# Patient Record
Sex: MALE | Race: WHITE | Employment: OTHER | URBAN - METROPOLITAN AREA
[De-identification: names, ages, dates, MRNs, and addresses within clinical notes are randomized per-mention and may not be internally consistent; named-entity substitution may affect disease eponyms.]

---

## 2018-01-11 ENCOUNTER — TELEPHONE (OUTPATIENT)
Dept: ORTHOPEDIC SURGERY | Age: 78
End: 2018-01-11

## 2018-01-11 ENCOUNTER — OFFICE VISIT (OUTPATIENT)
Dept: ORTHOPEDIC SURGERY | Age: 78
End: 2018-01-11

## 2018-01-11 VITALS
HEART RATE: 75 BPM | WEIGHT: 184 LBS | BODY MASS INDEX: 26.34 KG/M2 | SYSTOLIC BLOOD PRESSURE: 117 MMHG | HEIGHT: 70 IN | TEMPERATURE: 98.2 F | DIASTOLIC BLOOD PRESSURE: 64 MMHG

## 2018-01-11 DIAGNOSIS — M54.50 ACUTE BILATERAL LOW BACK PAIN WITHOUT SCIATICA: ICD-10-CM

## 2018-01-11 DIAGNOSIS — M25.551 HIP PAIN, BILATERAL: Primary | ICD-10-CM

## 2018-01-11 DIAGNOSIS — M25.552 HIP PAIN, BILATERAL: Primary | ICD-10-CM

## 2018-01-11 PROCEDURE — 4040F PNEUMOC VAC/ADMIN/RCVD: CPT | Performed by: ORTHOPAEDIC SURGERY

## 2018-01-11 PROCEDURE — 1036F TOBACCO NON-USER: CPT | Performed by: ORTHOPAEDIC SURGERY

## 2018-01-11 PROCEDURE — 1123F ACP DISCUSS/DSCN MKR DOCD: CPT | Performed by: ORTHOPAEDIC SURGERY

## 2018-01-11 PROCEDURE — G8427 DOCREV CUR MEDS BY ELIG CLIN: HCPCS | Performed by: ORTHOPAEDIC SURGERY

## 2018-01-11 PROCEDURE — G8484 FLU IMMUNIZE NO ADMIN: HCPCS | Performed by: ORTHOPAEDIC SURGERY

## 2018-01-11 PROCEDURE — 99213 OFFICE O/P EST LOW 20 MIN: CPT | Performed by: ORTHOPAEDIC SURGERY

## 2018-01-11 PROCEDURE — G8419 CALC BMI OUT NRM PARAM NOF/U: HCPCS | Performed by: ORTHOPAEDIC SURGERY

## 2018-01-11 NOTE — PROGRESS NOTES
This dictation was done with StepUp dictation and may contain mechanical errors related to translation. Blood pressure 117/64, pulse 75, temperature 98.2 °F (36.8 °C), temperature source Temporal, height 5' 10\" (1.778 m), weight 184 lb (83.5 kg).

## 2018-01-26 ENCOUNTER — TELEPHONE (OUTPATIENT)
Dept: ORTHOPEDIC SURGERY | Age: 78
End: 2018-01-26

## 2018-02-15 ENCOUNTER — OFFICE VISIT (OUTPATIENT)
Dept: ORTHOPEDIC SURGERY | Age: 78
End: 2018-02-15

## 2018-02-15 VITALS
SYSTOLIC BLOOD PRESSURE: 130 MMHG | HEIGHT: 70 IN | HEART RATE: 66 BPM | DIASTOLIC BLOOD PRESSURE: 73 MMHG | TEMPERATURE: 97.3 F | BODY MASS INDEX: 26.34 KG/M2 | WEIGHT: 184 LBS

## 2018-02-15 DIAGNOSIS — M48.061 SPINAL STENOSIS OF LUMBAR REGION, UNSPECIFIED WHETHER NEUROGENIC CLAUDICATION PRESENT: Primary | ICD-10-CM

## 2018-02-15 PROCEDURE — G8419 CALC BMI OUT NRM PARAM NOF/U: HCPCS | Performed by: PHYSICIAN ASSISTANT

## 2018-02-15 PROCEDURE — G8484 FLU IMMUNIZE NO ADMIN: HCPCS | Performed by: PHYSICIAN ASSISTANT

## 2018-02-15 PROCEDURE — 4040F PNEUMOC VAC/ADMIN/RCVD: CPT | Performed by: PHYSICIAN ASSISTANT

## 2018-02-15 PROCEDURE — 1123F ACP DISCUSS/DSCN MKR DOCD: CPT | Performed by: PHYSICIAN ASSISTANT

## 2018-02-15 PROCEDURE — 99213 OFFICE O/P EST LOW 20 MIN: CPT | Performed by: PHYSICIAN ASSISTANT

## 2018-02-15 PROCEDURE — 1036F TOBACCO NON-USER: CPT | Performed by: PHYSICIAN ASSISTANT

## 2018-02-15 PROCEDURE — G8427 DOCREV CUR MEDS BY ELIG CLIN: HCPCS | Performed by: PHYSICIAN ASSISTANT

## 2018-02-28 ENCOUNTER — OFFICE VISIT (OUTPATIENT)
Dept: ORTHOPEDIC SURGERY | Age: 78
End: 2018-02-28

## 2018-02-28 VITALS
SYSTOLIC BLOOD PRESSURE: 129 MMHG | WEIGHT: 184.08 LBS | DIASTOLIC BLOOD PRESSURE: 75 MMHG | BODY MASS INDEX: 26.35 KG/M2 | HEIGHT: 70 IN

## 2018-02-28 DIAGNOSIS — M51.36 DDD (DEGENERATIVE DISC DISEASE), LUMBAR: ICD-10-CM

## 2018-02-28 DIAGNOSIS — M47.816 SPONDYLOSIS OF LUMBAR REGION WITHOUT MYELOPATHY OR RADICULOPATHY: ICD-10-CM

## 2018-02-28 DIAGNOSIS — M48.062 LUMBAR STENOSIS WITH NEUROGENIC CLAUDICATION: Primary | ICD-10-CM

## 2018-02-28 DIAGNOSIS — M48.02 CERVICAL SPINAL STENOSIS: ICD-10-CM

## 2018-02-28 PROCEDURE — G8427 DOCREV CUR MEDS BY ELIG CLIN: HCPCS | Performed by: PHYSICAL MEDICINE & REHABILITATION

## 2018-02-28 PROCEDURE — G8484 FLU IMMUNIZE NO ADMIN: HCPCS | Performed by: PHYSICAL MEDICINE & REHABILITATION

## 2018-02-28 PROCEDURE — 4040F PNEUMOC VAC/ADMIN/RCVD: CPT | Performed by: PHYSICAL MEDICINE & REHABILITATION

## 2018-02-28 PROCEDURE — 99204 OFFICE O/P NEW MOD 45 MIN: CPT | Performed by: PHYSICAL MEDICINE & REHABILITATION

## 2018-02-28 PROCEDURE — G8419 CALC BMI OUT NRM PARAM NOF/U: HCPCS | Performed by: PHYSICAL MEDICINE & REHABILITATION

## 2018-02-28 NOTE — PROGRESS NOTES
Therapy:  none  · Chiropractic:  none  · Injection:  none  · Medications:   NSAIDS:  yes   Muscle relaxer:  none   Steriods:  none   Neuropathic medications:  none   Opioids:  none  · Previous surgery:  no  · Previous surgical consult:  no  · Other:  · Infection control  · Tested positive for MRSA in past 12 months:  no  · Tested positive for MSSA \"staph infection\" in past 12 months: no  · Tested positive for VRE (Vancomycin Resistant Enterococci) in past 12 months:   no  · Currently on any antibiotics for an infection: no  · Anticoagulants:  · On a blood thinner:  Yes-asa 81 mg   · Any history of bleeding disorder: no   · MRI Contraindication: no   · Previous Pain Management: no                   Past Medical History:   Past Medical History:   Diagnosis Date    Anxiety     Arthritis     Diabetes mellitus ([facility] [address]     High blood pressure     Hyperlipidemia       Past Surgical History:     Past Surgical History:   Procedure Laterality Date    COLONOSCOPY      CYST REMOVAL Left 5/21/15    FOOT SURGERY Left     cyst removed from foot    OTHER SURGICAL HISTORY      tumor removed from neck    SHOULDER ARTHROPLASTY Right 2/17/15    SHOULDER SURGERY Bilateral      Current Medications:     Current Outpatient Prescriptions:     metFORMIN (GLUCOPHAGE) 1000 MG tablet, Take 1,000 mg by mouth 2 times daily (with meals), Disp: , Rfl:     zolpidem (AMBIEN) 10 MG tablet, Take 10 mg by mouth nightly, Disp: , Rfl:     valsartan-hydrochlorothiazide (DIOVAN-HCT) 160-12.5 MG per tablet, Take 1 tablet by mouth daily. , Disp: , Rfl:     glipiZIDE (GLUCOTROL) 5 MG tablet, Take 5 mg by mouth., Disp: , Rfl:     simvastatin (ZOCOR) 40 MG tablet, Take 40 mg by mouth nightly., Disp: , Rfl:     sertraline (ZOLOFT) 25 MG tablet, Take 25 mg by mouth nightly., Disp: , Rfl:     Omega-3 Fatty Acids (FISH OIL) 1000 MG CPDR,  Take 1,000 mg by mouth 2 times daily , Disp: , Rfl:     aspirin 81 MG tablet, Take 81 mg by mouth daily. , Disp: , Rfl:   Allergies:  Codeine and Penicillins  Social History:    reports that he quit smoking about 42 years ago. He has never used smokeless tobacco. He reports that he does not drink alcohol or use drugs. Family History:   Family History   Problem Relation Age of Onset    Cancer Other     Diabetes Other     High Blood Pressure Other          REVIEW OF SYSTEMS: Full ROS noted & scanned          PHYSICAL EXAM:    Vitals: Blood pressure 129/75, height 5' 10\" (1.778 m), weight 184 lb 1.4 oz (83.5 kg). GENERAL EXAM:  · General Apparence: Patient is adequately groomed with no evidence of malnutrition. · Psychiatric: Orientation: The patient is oriented to time, place and person. The patient's mood and affect are appropriate   · Vascular: Examination reveals no swelling and palpation reveals no tenderness in upper or lower extremities. Good capillary refill. · The lymphatic examination of the neck, axillae and groin reveals all areas to be without enlargement or induration   Sensation is intact without deficit in the upper and lower extremities to light touch and pinprick  · Coordination of the upper and lower extremities are normal.    CERVICAL EXAMINATION:  · Inspection: Local inspection shows no step-off or bruising. Cervical alignment is normal. No instability is noted. · Palpation and Percussion: No evidence of tenderness at the midline, and trapezius. Paraspinal tenderness is not present. There is no paraspinal spasm. · Range of Motion:  limited by 25% in all planes due to pain   · Strength: 5/5 bilateral upper extremities  · Special Tests:   Spurling's and Barrera's are negative bilaterally. Brennan and Impingement tests are negative bilaterally. · Skin:There are no rashes, ulcerations or lesions in right & left upper extremities. · Reflexes: Bilaterally triceps, biceps and brachioradialis are 3+. Clonus absent bilaterally at the feet. No pathological reflexes are noted.   · Gait & station: wide distributed. For further information regarding the spine conditions and to review interventional treatments the patient was directed to [facility].  6.  Follow up:  4-6 weeks        Ashley Ugalde MD, JAMES, [facility]  Board Certified in [address] [place] [address] Certified and Fellowship Trained in [facility] ([facility])     This dictation was performed with a verbal recognition program [facility]) and it was checked for errors. It is possible that there are still dictated errors within this office note. If so, please bring any errors to my attention for an addendum. All efforts were made to ensure that this office note is accurate.

## 2018-02-28 NOTE — LETTER
Please schedule the following with:     Date:       Account: [de-identified]  Patient: Vanessa Freitas    : 1940  Address:  [address] [place] [address] [address]    Phone (H):  [phone] (home) [phone] (work)     ----------------------------------------------------------------------------------------------  Diagnosis:     ICD-10-CM ICD-9-CM    1. Lumbar stenosis with neurogenic claudication M48.062 724.03    2. Cervical spinal stenosis M48.02 723.0 MRI Cervical Spine WO Contrast   3. DDD (degenerative disc disease), lumbar M51.36 722.52    4. Spondylosis of lumbar region without myelopathy or radiculopathy M47.816 721.3          Levels: L4  bilaterally  Transforaminal LAUREN    ----------------------------------------------------------------------------------------------  Injection #   [id number] [facility]    Attending Physician       Prabhjot Brown.  Cassidy Mustafa MD.      ----------------------------------------------------------------------------------------------  Injection Scheduled For:    At:    1st Insurance:     Pre-Cert#    2nd Insurance:    Pre-Cert#    Comments:    · Infection control  · Tested positive for MRSA in past 12 months:  no  · Tested positive for MSSA \"staph infection\" in past 12 months: no  · Tested positive for VRE (Vancomycin Resistant Enterococci) in past 12 months:   no  · Currently on any antibiotics for an infection: no  · Anticoagulants:  · On a blood thinner:  ASA   · Any history of bleeding disorder: no   · Advanced Liver disease: no   · Advanced Renal disease: no   · Glaucoma: no   · Diabetes: yes     Sedation:  No  -----------------------------------------------------------------------------------------------  Allergies   Allergen Reactions    Codeine Other (See Comments)     Hallucinations    Penicillins

## 2018-03-01 ENCOUNTER — TELEPHONE (OUTPATIENT)
Dept: ORTHOPEDIC SURGERY | Age: 78
End: 2018-03-01

## 2018-03-01 NOTE — TELEPHONE ENCOUNTER
DOS   3/7/18  CPT   48774.50  66539.26  [id number]  NPR  DX   M48.062  M48.02  M51.36  OP SX AUTH NPR FOR THIS PLAN  BILATERAL  LEVELS   L4   PROCEDURE   EPIDURAL INJECTION  DR. Welch 75:   MEDICARE

## 2018-03-07 ENCOUNTER — HOSPITAL ENCOUNTER (OUTPATIENT)
Dept: PAIN MANAGEMENT | Age: 78
Discharge: HOME | End: 2018-03-07
Attending: PHYSICAL MEDICINE & REHABILITATION | Admitting: PHYSICAL MEDICINE & REHABILITATION

## 2018-03-07 VITALS
HEART RATE: 62 BPM | HEIGHT: 70 IN | TEMPERATURE: 97.2 F | BODY MASS INDEX: 26.63 KG/M2 | RESPIRATION RATE: 18 BRPM | WEIGHT: 186 LBS | OXYGEN SATURATION: 99 % | DIASTOLIC BLOOD PRESSURE: 76 MMHG | SYSTOLIC BLOOD PRESSURE: 140 MMHG

## 2018-03-07 LAB
GLUCOSE BLD-MCNC: 111 MG/DL (ref 70–99)
PERFORMED ON: ABNORMAL

## 2018-03-07 ASSESSMENT — PAIN DESCRIPTION - DESCRIPTORS: DESCRIPTORS: ACHING

## 2018-03-07 ASSESSMENT — PAIN - FUNCTIONAL ASSESSMENT: PAIN_FUNCTIONAL_ASSESSMENT: 0-10

## 2018-03-07 NOTE — PROGRESS NOTES
Adm to post procedure. Awake and alert. Procedural site unremarkable. States pain 2/10.   Stephen lower extremities strength equal.

## 2018-03-07 NOTE — PROGRESS NOTES
TRANSFORAMINAL INJECTION  Dr Garrett Sella Injection Note    Sight marked/ confirmed with x-ray: yes  Position: Prone  Prepped with: Chloraprep    Local 1 % Lidocaine    Depomedrol (mg):  Marcaine: .5%(ml)     Monitor by: Enrique James RN  C - Arm operated by:AMANDA OSBORNE RT  Circulator: Steve He RN  Prepped by: Juan Pablo Connell MD

## 2018-03-20 ENCOUNTER — TELEPHONE (OUTPATIENT)
Dept: ORTHOPEDIC SURGERY | Age: 78
End: 2018-03-20

## 2018-03-20 DIAGNOSIS — E07.9 THYROID LESION: Primary | ICD-10-CM

## 2018-03-20 NOTE — TELEPHONE ENCOUNTER
LM for patient regarding the results of the MRI CSP completed on 03/05/18. Per Dr. Kristan Price:  Schedule for u/s of the thyroid due to right thyroid lesion measuring 2.6 gm    Ordered was placed in the chart. The patient can call to schedule at any time. #[phone]. Patient currently has a f/u appointment with Dr. Kristan Price on 03/21/18.

## 2018-03-21 ENCOUNTER — OFFICE VISIT (OUTPATIENT)
Dept: ORTHOPEDIC SURGERY | Age: 78
End: 2018-03-21

## 2018-03-21 ENCOUNTER — HOSPITAL ENCOUNTER (OUTPATIENT)
Dept: ULTRASOUND IMAGING | Age: 78
Discharge: HOME | End: 2018-03-21
Attending: PHYSICAL MEDICINE & REHABILITATION | Admitting: PHYSICAL MEDICINE & REHABILITATION

## 2018-03-21 VITALS
WEIGHT: 186.07 LBS | HEIGHT: 70 IN | DIASTOLIC BLOOD PRESSURE: 75 MMHG | SYSTOLIC BLOOD PRESSURE: 129 MMHG | BODY MASS INDEX: 26.64 KG/M2

## 2018-03-21 DIAGNOSIS — E07.9 THYROID LESION: ICD-10-CM

## 2018-03-21 DIAGNOSIS — E04.9 THYROID GOITER: ICD-10-CM

## 2018-03-21 DIAGNOSIS — E07.89 OTHER SPECIFIED DISORDERS OF THYROID: ICD-10-CM

## 2018-03-21 DIAGNOSIS — M48.061 SPINAL STENOSIS OF LUMBAR REGION WITHOUT NEUROGENIC CLAUDICATION: Primary | ICD-10-CM

## 2018-03-21 DIAGNOSIS — M47.816 SPONDYLOSIS OF LUMBAR REGION WITHOUT MYELOPATHY OR RADICULOPATHY: ICD-10-CM

## 2018-03-21 PROCEDURE — 1036F TOBACCO NON-USER: CPT | Performed by: PHYSICAL MEDICINE & REHABILITATION

## 2018-03-21 PROCEDURE — 1123F ACP DISCUSS/DSCN MKR DOCD: CPT | Performed by: PHYSICAL MEDICINE & REHABILITATION

## 2018-03-21 PROCEDURE — G8427 DOCREV CUR MEDS BY ELIG CLIN: HCPCS | Performed by: PHYSICAL MEDICINE & REHABILITATION

## 2018-03-21 PROCEDURE — G8484 FLU IMMUNIZE NO ADMIN: HCPCS | Performed by: PHYSICAL MEDICINE & REHABILITATION

## 2018-03-21 PROCEDURE — 99213 OFFICE O/P EST LOW 20 MIN: CPT | Performed by: PHYSICAL MEDICINE & REHABILITATION

## 2018-03-21 PROCEDURE — 4040F PNEUMOC VAC/ADMIN/RCVD: CPT | Performed by: PHYSICAL MEDICINE & REHABILITATION

## 2018-03-21 PROCEDURE — G8419 CALC BMI OUT NRM PARAM NOF/U: HCPCS | Performed by: PHYSICAL MEDICINE & REHABILITATION

## 2018-03-21 NOTE — PROGRESS NOTES
metFORMIN (GLUCOPHAGE) 1000 MG tablet, Take 1,000 mg by mouth 2 times daily (with meals), Disp: , Rfl:     zolpidem (AMBIEN) 10 MG tablet, Take 10 mg by mouth nightly, Disp: , Rfl:     valsartan-hydrochlorothiazide (DIOVAN-HCT) 160-12.5 MG per tablet, Take 1 tablet by mouth daily. , Disp: , Rfl:     glipiZIDE (GLUCOTROL) 5 MG tablet, Take 5 mg by mouth., Disp: , Rfl:     simvastatin (ZOCOR) 40 MG tablet, Take 40 mg by mouth nightly., Disp: , Rfl:     sertraline (ZOLOFT) 25 MG tablet, Take 25 mg by mouth nightly., Disp: , Rfl:     Omega-3 Fatty Acids (FISH OIL) 1000 MG CPDR,  Take 1,000 mg by mouth 2 times daily , Disp: , Rfl:     aspirin 81 MG tablet, Take 81 mg by mouth daily. , Disp: , Rfl:   Allergies:  Codeine and Penicillins  Social History:    reports that he quit smoking about 42 years ago. He has never used smokeless tobacco. He reports that he does not drink alcohol or use drugs. Family History:   Family History   Problem Relation Age of Onset    Cancer Other     Diabetes Other     High Blood Pressure Other        REVIEW OF SYSTEMS:   CONSTITUTIONAL: Denies unexplained weight loss, fevers, chills or fatigue  NEUROLOGICAL: Denies unsteady gait or progressive weakness  MUSCULOSKELETAL: Denies joint swelling or redness  GI: Denies nausea, vomiting, diarrhea   : Denies bowel or bladder issues       PHYSICAL EXAM:    Vitals: Blood pressure 129/75, height 5' 10\" (1.778 m), weight 186 lb 1.1 oz (84.4 kg). GENERAL EXAM:  · General Apparence: Patient is adequately groomed with no evidence of malnutrition. · Psychiatric: Orientation: The patient is oriented to time, place and person. The patient's mood and affect are appropriate   · Vascular: Examination reveals no swelling and palpation reveals no tenderness in upper or lower extremities. Good capillary refill.    · The lymphatic examination of the neck, axillae and groin reveals all areas to be without enlargement or induration  · Sensation is intact without deficit in the upper and lower extremities to light touch and pinprick  · Coordination of the upper and lower extremities are normal.  ·   LUMBAR/SACRAL EXAMINATION:  · Inspection: Local inspection shows no step-off or bruising. Lumbar alignment is normal. No instability is noted. · Palpation:   No evidence of tenderness at the midline. No tenderness bilaterally at the paraspinal or trochanters. There is no paraspinal spasm. · Range of Motion: limited by 50% in all planes due to pain  · Strength:   Strength testing is 5/5 in all muscle groups tested. · Special Tests:   Straight leg raise and crossed SLR negative. · Skin: There are no rashes, ulcerations or lesions. · Reflexes: Reflexes are symmetrically 1+ at the patellar and ankle tendons. Clonus absent bilaterally at the feet. · Gait & station: normal, patient ambulates without assistance  · Additional Examinations:  · RIGHT LOWER EXTREMITY: Inspection/examination of the right lower extremity does not show any tenderness, deformity or injury. Range of motion is normal and pain-free. There is no gross instability. There are no rashes, ulcerations or lesions. Strength and tone are normal. No atrophy or abnormal movements are noted. · LEFT LOWER EXTREMITY:  Inspection/examination of the left lower extremity does not show any tenderness, deformity or injury. Range of motion is normal and pain-free. There is no gross instability. There are no rashes, ulcerations or lesions. Strength and tone are normal. No atrophy or abnormal movements are noted. Diagnostic Testing:    Thyroid u/s shows goiter   Results for orders placed or performed during the hospital encounter of 03/07/18   POCT Glucose   Result Value Ref Range    POC Glucose 111 (H) 70 - 99 mg/dl    Performed on ACCU-CHEK      Impression:       1. Spinal stenosis of lumbar region without neurogenic claudication    2. Spondylosis of lumbar region without myelopathy or radiculopathy    3. Thyroid goiter        Plan:  Clinical Course: Improved pain  1. Medications:  Continue anti-inflammatories with appropriate GI Precautions including to stop if develop dark tarry stools or GI upset and to take with food. 2. PT:  Encouraged to continue with HEP. 3. Further studies: We will send the results of his ultrasound of the thyroid to his PCP  4. Interventional:  90 % relief after LESI  5. Follow up:  4-6 weeks          Moni. Arabella Mckinley MD, JAMES, [facility]  Board Certified in [address] [place] [address] Certified and Fellowship Trained in [facility] ([facility])             This dictation was performed with a verbal recognition program [facility]) and it was checked for errors. It is possible that there are still dictated errors within this office note. If so, please bring any errors to my attention for an addendum. All efforts were made to ensure that this office note is accurate.

## 2018-04-20 ENCOUNTER — OFFICE VISIT (OUTPATIENT)
Dept: ORTHOPEDIC SURGERY | Age: 78
End: 2018-04-20

## 2018-04-20 VITALS
BODY MASS INDEX: 26.63 KG/M2 | HEIGHT: 70 IN | SYSTOLIC BLOOD PRESSURE: 125 MMHG | HEART RATE: 71 BPM | WEIGHT: 186 LBS | DIASTOLIC BLOOD PRESSURE: 73 MMHG

## 2018-04-20 DIAGNOSIS — S82.831A TRAUMATIC CLOSED NONDISPLACED FRACTURE OF DISTAL FIBULA, RIGHT, INITIAL ENCOUNTER: ICD-10-CM

## 2018-04-20 DIAGNOSIS — M25.571 ACUTE RIGHT ANKLE PAIN: Primary | ICD-10-CM

## 2018-04-20 PROCEDURE — 4040F PNEUMOC VAC/ADMIN/RCVD: CPT | Performed by: ORTHOPAEDIC SURGERY

## 2018-04-20 PROCEDURE — G8419 CALC BMI OUT NRM PARAM NOF/U: HCPCS | Performed by: ORTHOPAEDIC SURGERY

## 2018-04-20 PROCEDURE — 99213 OFFICE O/P EST LOW 20 MIN: CPT | Performed by: ORTHOPAEDIC SURGERY

## 2018-04-20 PROCEDURE — 1123F ACP DISCUSS/DSCN MKR DOCD: CPT | Performed by: ORTHOPAEDIC SURGERY

## 2018-04-20 PROCEDURE — G8427 DOCREV CUR MEDS BY ELIG CLIN: HCPCS | Performed by: ORTHOPAEDIC SURGERY

## 2018-04-20 PROCEDURE — 1036F TOBACCO NON-USER: CPT | Performed by: ORTHOPAEDIC SURGERY

## 2018-05-04 ENCOUNTER — OFFICE VISIT (OUTPATIENT)
Dept: ORTHOPEDIC SURGERY | Age: 78
End: 2018-05-04

## 2018-05-04 VITALS
HEART RATE: 82 BPM | WEIGHT: 186 LBS | HEIGHT: 70 IN | SYSTOLIC BLOOD PRESSURE: 104 MMHG | BODY MASS INDEX: 26.63 KG/M2 | DIASTOLIC BLOOD PRESSURE: 63 MMHG

## 2018-05-04 DIAGNOSIS — S82.831D TRAUMATIC CLOSED NONDISPLACED FRACTURE OF DISTAL FIBULA, RIGHT, WITH ROUTINE HEALING, SUBSEQUENT ENCOUNTER: Primary | ICD-10-CM

## 2018-05-04 PROCEDURE — 99213 OFFICE O/P EST LOW 20 MIN: CPT | Performed by: PHYSICIAN ASSISTANT

## 2018-05-25 ENCOUNTER — OFFICE VISIT (OUTPATIENT)
Dept: ORTHOPEDIC SURGERY | Age: 78
End: 2018-05-25

## 2018-05-25 VITALS
BODY MASS INDEX: 26.63 KG/M2 | SYSTOLIC BLOOD PRESSURE: 134 MMHG | DIASTOLIC BLOOD PRESSURE: 76 MMHG | WEIGHT: 186 LBS | HEART RATE: 83 BPM | HEIGHT: 70 IN

## 2018-05-25 DIAGNOSIS — S82.831D TRAUMATIC CLOSED NONDISPLACED FRACTURE OF DISTAL FIBULA, RIGHT, WITH ROUTINE HEALING, SUBSEQUENT ENCOUNTER: Primary | ICD-10-CM

## 2018-05-25 PROCEDURE — 99213 OFFICE O/P EST LOW 20 MIN: CPT | Performed by: PHYSICIAN ASSISTANT

## 2018-06-22 ENCOUNTER — OFFICE VISIT (OUTPATIENT)
Dept: ORTHOPEDIC SURGERY | Age: 78
End: 2018-06-22

## 2018-06-22 VITALS
BODY MASS INDEX: 26.63 KG/M2 | HEART RATE: 68 BPM | SYSTOLIC BLOOD PRESSURE: 139 MMHG | WEIGHT: 186 LBS | HEIGHT: 70 IN | DIASTOLIC BLOOD PRESSURE: 74 MMHG

## 2018-06-22 DIAGNOSIS — S82.831D TRAUMATIC CLOSED NONDISPLACED FRACTURE OF DISTAL FIBULA, RIGHT, WITH ROUTINE HEALING, SUBSEQUENT ENCOUNTER: Primary | ICD-10-CM

## 2018-06-22 PROCEDURE — 99213 OFFICE O/P EST LOW 20 MIN: CPT | Performed by: PHYSICIAN ASSISTANT

## 2018-06-22 PROCEDURE — G8427 DOCREV CUR MEDS BY ELIG CLIN: HCPCS | Performed by: PHYSICIAN ASSISTANT

## 2018-06-22 PROCEDURE — 4040F PNEUMOC VAC/ADMIN/RCVD: CPT | Performed by: PHYSICIAN ASSISTANT

## 2018-06-22 PROCEDURE — G8419 CALC BMI OUT NRM PARAM NOF/U: HCPCS | Performed by: PHYSICIAN ASSISTANT

## 2018-06-22 PROCEDURE — 1123F ACP DISCUSS/DSCN MKR DOCD: CPT | Performed by: PHYSICIAN ASSISTANT

## 2018-06-22 PROCEDURE — 1036F TOBACCO NON-USER: CPT | Performed by: PHYSICIAN ASSISTANT

## 2018-08-03 ENCOUNTER — OFFICE VISIT (OUTPATIENT)
Dept: ORTHOPEDIC SURGERY | Age: 78
End: 2018-08-03

## 2018-08-03 VITALS
HEART RATE: 65 BPM | SYSTOLIC BLOOD PRESSURE: 138 MMHG | WEIGHT: 187 LBS | BODY MASS INDEX: 26.77 KG/M2 | DIASTOLIC BLOOD PRESSURE: 75 MMHG | HEIGHT: 70 IN

## 2018-08-03 DIAGNOSIS — S82.831D TRAUMATIC CLOSED NONDISPLACED FRACTURE OF DISTAL FIBULA, RIGHT, WITH ROUTINE HEALING, SUBSEQUENT ENCOUNTER: Primary | ICD-10-CM

## 2018-08-03 PROCEDURE — 1036F TOBACCO NON-USER: CPT | Performed by: PHYSICIAN ASSISTANT

## 2018-08-03 PROCEDURE — 99213 OFFICE O/P EST LOW 20 MIN: CPT | Performed by: PHYSICIAN ASSISTANT

## 2018-08-03 PROCEDURE — 1123F ACP DISCUSS/DSCN MKR DOCD: CPT | Performed by: PHYSICIAN ASSISTANT

## 2018-08-03 PROCEDURE — G8419 CALC BMI OUT NRM PARAM NOF/U: HCPCS | Performed by: PHYSICIAN ASSISTANT

## 2018-08-03 PROCEDURE — 4040F PNEUMOC VAC/ADMIN/RCVD: CPT | Performed by: PHYSICIAN ASSISTANT

## 2018-08-03 PROCEDURE — 1101F PT FALLS ASSESS-DOCD LE1/YR: CPT | Performed by: PHYSICIAN ASSISTANT

## 2018-08-03 PROCEDURE — G8427 DOCREV CUR MEDS BY ELIG CLIN: HCPCS | Performed by: PHYSICIAN ASSISTANT

## 2018-08-03 NOTE — LETTER
[facility]  [address] [address]  Phone: [phone]  Fax: [phone]    Kevin Lieberman        August 3, 2018     Raffaele Johnson MD  [facility] [address]    Patient: Christopher Ruffin  MR Number: [id number]  YOB: 1940  Date of Visit: 8/3/2018    Dear Dr. Raffaele Johnson:    Thank you for the request for consultation for Gerald Alaniz to me for the evaluation of his right ankle. Below are the relevant portions of my assessment and plan of care. If you have questions, please do not hesitate to call me. I look forward to following Nakia Dietz along with you.     Sincerely,    Dr. Irene Ly PA-C

## 2018-08-03 NOTE — PROGRESS NOTES
at the tip of the fibula which is likely a old injury. Orders:  Orders Placed This Encounter   Procedures    XR ANKLE RIGHT (MIN 3 VIEWS)       Impression:   Diagnosis Orders   1. Traumatic closed nondisplaced fracture of distal fibula, right, with routine healing, subsequent encounter  XR ANKLE RIGHT (MIN 3 VIEWS)       Treatment Plan:    Patient is still having considerable pain and occasional swelling of the lateral malleolus. He is a very active person. His family doctor gave him a order for physical therapy which he would like to do here at [facility]. He is going to go today to schedule those appointments. He will work more on resting the right ankle. He will elevate and ice as needed. He will plan to follow up in 4 weeks at which time we will repeat imaging of the right ankle. Erica Richardson was informed of the results of any imaging. We discussed treatment options and a time was given to answer questions. A plan was proposed and Erica Richardson understand and accepts this course of care. Electronically signed by Lawanda Kowalski PA-C on 5/0/7383  Board Certified [facility]    Please note that portions of this note were completed with a voice recognition program.  Efforts were made to edit the dictations but occasionally words are mis-transcribed.

## 2018-08-06 ENCOUNTER — OFFICE VISIT (OUTPATIENT)
Dept: ORTHOPEDIC SURGERY | Age: 78
End: 2018-08-06

## 2018-08-06 VITALS
BODY MASS INDEX: 27.06 KG/M2 | DIASTOLIC BLOOD PRESSURE: 67 MMHG | WEIGHT: 189 LBS | HEIGHT: 70 IN | HEART RATE: 72 BPM | SYSTOLIC BLOOD PRESSURE: 120 MMHG

## 2018-08-06 DIAGNOSIS — S82.832A OTHER CLOSED FRACTURE OF DISTAL END OF LEFT FIBULA, INITIAL ENCOUNTER: Primary | ICD-10-CM

## 2018-08-06 DIAGNOSIS — S93.422A SPRAIN OF DELTOID LIGAMENT OF LEFT ANKLE, INITIAL ENCOUNTER: ICD-10-CM

## 2018-08-06 PROCEDURE — 99213 OFFICE O/P EST LOW 20 MIN: CPT | Performed by: PHYSICIAN ASSISTANT

## 2018-08-06 PROCEDURE — 1036F TOBACCO NON-USER: CPT | Performed by: PHYSICIAN ASSISTANT

## 2018-08-06 PROCEDURE — 1123F ACP DISCUSS/DSCN MKR DOCD: CPT | Performed by: PHYSICIAN ASSISTANT

## 2018-08-06 PROCEDURE — G8419 CALC BMI OUT NRM PARAM NOF/U: HCPCS | Performed by: PHYSICIAN ASSISTANT

## 2018-08-06 PROCEDURE — G8427 DOCREV CUR MEDS BY ELIG CLIN: HCPCS | Performed by: PHYSICIAN ASSISTANT

## 2018-08-06 PROCEDURE — 1101F PT FALLS ASSESS-DOCD LE1/YR: CPT | Performed by: PHYSICIAN ASSISTANT

## 2018-08-06 PROCEDURE — 4040F PNEUMOC VAC/ADMIN/RCVD: CPT | Performed by: PHYSICIAN ASSISTANT

## 2018-08-06 NOTE — PROGRESS NOTES
Patient Name: Te Harris  Medical Record Number: [id number]  YOB: 1940  Date of Encounter: 8/6/2018     Chief Complaint   Patient presents with    Ankle Injury     Injured Left ankle when he fell down 1 step, onto carpet; doi 8/4/18. History of Present Illness:  Te Harris is a 76 y.o. male here for evaluation of his left ankle. His pain assessment is documented below and I reviewed this with him today. Patient states he tripped down one stair 2 days ago and twisted his left ankle. He is having pain he rates as 6/10 over the lateral ankle. He also has mild medial ankle pain. He has had a lot of lateral ankle swelling. He is 4 months out from a right distal fibular fracture. He still has crutches and a walking boot. He is using his crutches. He is not wearing his walking boot. He denies numbness or tingling in the left leg. He denies pain in the left knee or hip.     Pain Assessment  Location of Pain: Ankle  Severity of Pain: 6  Quality of Pain: Aching  Duration of Pain: Persistent  Frequency of Pain: Intermittent  Date Pain First Started:  (doi 8/4/18)  Aggravating Factors: Walking  Limiting Behavior: Yes  Relieving Factors: Rest  Result of Injury: Yes  Work-Related Injury: No  Are there other pain locations you wish to document?: No    Past Medical History:   Diagnosis Date    Anxiety     Arthritis     Diabetes mellitus ([address]     High blood pressure     Hyperlipidemia        Past Surgical History:   Procedure Laterality Date    COLONOSCOPY      CYST REMOVAL Left 5/21/15    FOOT SURGERY Left     cyst removed from foot    JOINT REPLACEMENT Right     shoulder    OTHER SURGICAL HISTORY      tumor removed from neck    SHOULDER ARTHROPLASTY Right 2/17/15    SHOULDER SURGERY Bilateral     WRIST SURGERY         Current Outpatient Prescriptions   Medication Sig Dispense Refill    metFORMIN (GLUCOPHAGE) 1000 MG tablet Take 1,000 mg by mouth 2 times daily (with meals)     

## 2018-08-20 ENCOUNTER — OFFICE VISIT (OUTPATIENT)
Dept: ORTHOPEDIC SURGERY | Age: 78
End: 2018-08-20

## 2018-08-20 VITALS
WEIGHT: 189 LBS | SYSTOLIC BLOOD PRESSURE: 134 MMHG | HEART RATE: 71 BPM | DIASTOLIC BLOOD PRESSURE: 78 MMHG | HEIGHT: 70 IN | BODY MASS INDEX: 27.06 KG/M2

## 2018-08-20 DIAGNOSIS — S82.832D OTHER CLOSED FRACTURE OF DISTAL END OF LEFT FIBULA WITH ROUTINE HEALING, SUBSEQUENT ENCOUNTER: Primary | ICD-10-CM

## 2018-08-20 PROCEDURE — 1101F PT FALLS ASSESS-DOCD LE1/YR: CPT | Performed by: PHYSICIAN ASSISTANT

## 2018-08-20 PROCEDURE — 1036F TOBACCO NON-USER: CPT | Performed by: PHYSICIAN ASSISTANT

## 2018-08-20 PROCEDURE — 99213 OFFICE O/P EST LOW 20 MIN: CPT | Performed by: PHYSICIAN ASSISTANT

## 2018-08-20 PROCEDURE — G8427 DOCREV CUR MEDS BY ELIG CLIN: HCPCS | Performed by: PHYSICIAN ASSISTANT

## 2018-08-20 PROCEDURE — 1123F ACP DISCUSS/DSCN MKR DOCD: CPT | Performed by: PHYSICIAN ASSISTANT

## 2018-08-20 PROCEDURE — G8419 CALC BMI OUT NRM PARAM NOF/U: HCPCS | Performed by: PHYSICIAN ASSISTANT

## 2018-08-20 PROCEDURE — 4040F PNEUMOC VAC/ADMIN/RCVD: CPT | Performed by: PHYSICIAN ASSISTANT

## 2018-08-20 NOTE — PROGRESS NOTES
routine healing, subsequent encounter         Treatment Plan:    Patient is doing well 2 weeks out from his injury with left distal fibula fracture. He will continue wearing his postop boot. He will continue using crutches as needed. He will rest, elevate and ice. He will plan on following back up in 3 weeks at which time we will repeat imaging. If x-rays show evidence of good bone healing he will likely start physical therapy which he already had planned for his right distal fibula fracture. He will follow up before that time with any concerns. Salma Schmitz was informed of the results of any imaging. We discussed treatment options and a time was given to answer questions. A plan was proposed and Salma Schmitz understand and accepts this course of care. Electronically signed by Dwain Nelson PA-C on 4/81/2822  Board Certified [facility]    Please note that portions of this note were completed with a voice recognition program.  Efforts were made to edit the dictations but occasionally words are mis-transcribed.

## 2018-08-20 NOTE — LETTER
[facility]  [address] [facility] [address]  Phone: [phone]  Fax: [phone]    Maritza Huitron *        August 20, 2018       Patient: Sherrill Figueroa   MR Number: [id number]   YOB: 1940   Date of Visit: 8/20/2018       Dear Dr. Hackett Nim: Thank you for the request for consultation for Dlemy Lois to me for the evaluation of his left ankle. Below are the relevant portions of my assessment and plan of care. If you have questions, please do not hesitate to call me. I look forward to following Lazarus Messier along with you.     Sincerely,    Dr. Val Cavazos PA-C    CC providers:  MD Anish Arevalo [id number]  VIA In Basket

## 2018-09-01 ENCOUNTER — HOSPITAL ENCOUNTER (OUTPATIENT)
Dept: OTHER | Age: 78
Discharge: HOME | End: 2018-09-01
Attending: INTERNAL MEDICINE | Admitting: INTERNAL MEDICINE

## 2018-09-11 ENCOUNTER — OFFICE VISIT (OUTPATIENT)
Dept: ORTHOPEDIC SURGERY | Age: 78
End: 2018-09-11

## 2018-09-11 VITALS — BODY MASS INDEX: 27.06 KG/M2 | HEIGHT: 70 IN | WEIGHT: 189 LBS

## 2018-09-11 DIAGNOSIS — S82.831D OTHER CLOSED FRACTURE OF DISTAL END OF RIGHT FIBULA WITH ROUTINE HEALING, SUBSEQUENT ENCOUNTER: ICD-10-CM

## 2018-09-11 DIAGNOSIS — S82.832D OTHER CLOSED FRACTURE OF DISTAL END OF LEFT FIBULA WITH ROUTINE HEALING, SUBSEQUENT ENCOUNTER: Primary | ICD-10-CM

## 2018-09-11 PROCEDURE — G8419 CALC BMI OUT NRM PARAM NOF/U: HCPCS | Performed by: PHYSICIAN ASSISTANT

## 2018-09-11 PROCEDURE — 1101F PT FALLS ASSESS-DOCD LE1/YR: CPT | Performed by: PHYSICIAN ASSISTANT

## 2018-09-11 PROCEDURE — G8427 DOCREV CUR MEDS BY ELIG CLIN: HCPCS | Performed by: PHYSICIAN ASSISTANT

## 2018-09-11 PROCEDURE — 99213 OFFICE O/P EST LOW 20 MIN: CPT | Performed by: PHYSICIAN ASSISTANT

## 2018-09-11 PROCEDURE — 1123F ACP DISCUSS/DSCN MKR DOCD: CPT | Performed by: PHYSICIAN ASSISTANT

## 2018-09-11 PROCEDURE — 1036F TOBACCO NON-USER: CPT | Performed by: PHYSICIAN ASSISTANT

## 2018-09-11 PROCEDURE — 4040F PNEUMOC VAC/ADMIN/RCVD: CPT | Performed by: PHYSICIAN ASSISTANT

## 2018-09-11 NOTE — PROGRESS NOTES
Patient Name: Anjali Lofton  Medical Record Number: [id number]  YOB: 1940  Date of Encounter: 9/11/2018     Chief Complaint   Patient presents with    Follow-up     Left ankle, distal fibula Fx; doi 8/4/18. History of Present Illness:   Mr. Anjali Lofton is here in 5 week, 3 day follow up regarding his LEFT distal fibula fracture he sustained during a mechanical fall on 8/4/2018. Patient also sustained a nondisplaced RIGHT distal fibular fracture after a mechanical fall on 4/19/2018. Patient feels he is doing well. He stopped wearing his walking boot 3-4 days ago. He denies having bilateral ankle pain at this time. He denies ankle swelling. He states his ankles are very stiff bilaterally. He was just about to start physical therapy on his right ankle when he fractured his left ankle. The patient's past medical history, medications, allergies, family history, social history, and review of systems have been reviewed, and dated and are recorded in the chart under the 'MEDIA\" tab. Physical Exam:    Mr. Anjali Lofton appears well, he is in no apparent distress, he demonstrates appropriate mood & affect. He is alert and oriented to person, place and time. Ht 5' 10\" (1.778 m)   Wt 189 lb (85.7 kg)   BMI 27.12 kg/m²     On examination of patient's left ankle there is no swelling or joint effusion. He still has mild tenderness on palpation of the distal fibula. He has quite range of motion of the left ankle with 4/5 motor strength. He has 2+ distal pulses. He denies sensory deficits. There is no edema or erythema in the affected extremity. There are no signs/symptoms of DVT/PE or infection    Radiology:  X-rays obtained and reviewed in office:   Views: 3 view left ankle including AP, lateral and oblique  Impression: Patient has a healing oblique fracture of the distal fibula. There is no significant displacement or shortening.     Orders:  Orders Placed This Encounter Procedures    XR ANKLE LEFT (MIN 3 VIEWS)       Impression:   Diagnosis Orders   1. Other closed fracture of distal end of left fibula with routine healing, subsequent encounter  XR ANKLE LEFT (MIN 3 VIEWS)   2. Other closed fracture of distal end of right fibula with routine healing, subsequent encounter         Treatment Plan:    Patient is doing well a little over 5 weeks out from his left distal fibula fracture. X-rays show evidence of good bone healing. Patient's pain has improved. He is no longer having swelling. He no longer needs to wear his walking boot. He is using good, tall, lace up shoes. He will start physical therapy working on range of motion and strengthening of his ankles bilaterally. He will follow-up in 6 weeks at which time we will repeat imaging of his ankles bilaterally. Jerryfredacam Gross was informed of the results of any imaging. We discussed treatment options and a time was given to answer questions. A plan was proposed and Kurtis Gross understand and accepts this course of care. Electronically signed by Kira Tse PA-C on 9/98/6220  Board Certified [facility]    Please note that portions of this note were completed with a voice recognition program.  Efforts were made to edit the dictations but occasionally words are mis-transcribed.

## 2018-09-25 ENCOUNTER — HOSPITAL ENCOUNTER (OUTPATIENT)
Dept: PHYSICAL THERAPY | Age: 78
Setting detail: THERAPIES SERIES
Discharge: HOME | End: 2018-09-25
Payer: MEDICARE

## 2018-09-25 PROCEDURE — 97110 THERAPEUTIC EXERCISES: CPT

## 2018-09-25 PROCEDURE — 97112 NEUROMUSCULAR REEDUCATION: CPT

## 2018-09-27 ENCOUNTER — HOSPITAL ENCOUNTER (OUTPATIENT)
Dept: PHYSICAL THERAPY | Age: 78
Setting detail: THERAPIES SERIES
Discharge: HOME | End: 2018-09-27
Payer: MEDICARE

## 2018-09-27 PROCEDURE — 97112 NEUROMUSCULAR REEDUCATION: CPT

## 2018-09-27 PROCEDURE — 97110 THERAPEUTIC EXERCISES: CPT

## 2018-09-27 NOTE — FLOWSHEET NOTE
Physical Therapy Daily Treatment Note  Date:  2018    Patient Name:  Al Ahumada    :  1940  MRN: [id number]  Restrictions/Precautions:   Right ankle fracture 2018, Left ankle fracture 2018. Medical/Treatment Diagnosis Information:   · Diagnosis: Weakness both legs, acute left ankle pain   · Treatment Diagnosis: Impaired balance/proprioception, BLE ankle stability/strength, decreased gait mechanics     Tracking Information:  Physician Information Referring Practitioner: Addis Zheng     Plan of Care Sent Date:  18  Signed Received:    Visit Count / Total Visits  3/12    Insurance Approved Visits  /  Approved Dates:     Insurance Information PT Insurance Information: Medicare     Progress Note/G-codes   [x]  Yes  []  No Next Due:  10th visit      Pain level:  0/10 left ankle      Subjective:   Says the ankle is doing good, hip is a little stiff after the last visit and the exercises done at home. Ankle only hurts when I move it a certain way    Objective:   Observation:  . Pt had LE buckling episode in // bars after step exercises, pt fell almost to knees, assist with bars, and therapist to maintain safely. Pt was OK after rest.   Test measurements:      Exercises:  Exercise/Equipment Resistance/Repetitions Other comments   TM/Bike #1 level 3 x 3 mins     IB, gastroc/soleus, HR/TR Gastroc/Soleus 30\" x 2 each  HR/TR 2x 10     Balance // Bars Wobbleboard Ant/Post, Med/Lat x 20, DLS 28\"     Tandem stance 30\" x 2 B  SLS 20\" x 2 B   Lat Band Walk, Green band x 3 laps     Cable Column     T.G. Partial Squats x10  . Diff performing    Step-Ups 6\" Fwd, Lat x 10 B   4\" step-up and over, ecc control x 10 B (BUE support) .   RLE buckling without UE support for FSU                                              Other Therapeutic Activities:  Pt was educated on PT POC, Diagnosis, Prognosis, pathomechanics, as well as treatment goals and options, and frequency/duration of scheduling future physical therapy appointments. Time was also taken on this day to answer all patient questions involving their participation in PT      Home Exercise Program:  Pt was educated on  HEP including distribution of handout describing exercises, sets, repetitions, frequency and intensity. Exercises/activities include: Ankle ABC's, HR/TR seated and standing, 4-way green TB ankle resistance, SLS, gastroc/soleus stretching       Manual Treatments:  9/19. Manual PROM left ankle, all directions, Grade II/III inf/post mobs, talocrural/subtalar x 5 mins. Modalities:      Timed Code Treatment Minutes:  31    Total Treatment Minutes:  31    Treatment/Activity Tolerance:  [x] Patient tolerated treatment well [] Patient limited by fatigue  [] Patient limited by pain  [] Patient limited by other medical complications  [] Other:     Prognosis: [x] Good [] Fair  [] Poor    Patient Requires Follow-up: [x] Yes  [] No    Plan:   [] Continue per plan of care [] Alter current plan (see comments)  [x] Plan of care initiated [] Hold pending MD visit [] Discharge  Plan for Next Session:  Balance, ankle ROM, manual therapy.   Work on hip strengthening up the chain, gait training    Electronically signed by:  Berto Dey, PT

## 2018-10-02 ENCOUNTER — HOSPITAL ENCOUNTER (OUTPATIENT)
Dept: PHYSICAL THERAPY | Age: 78
Setting detail: THERAPIES SERIES
Discharge: HOME | End: 2018-10-02
Payer: MEDICARE

## 2018-10-02 PROCEDURE — 97110 THERAPEUTIC EXERCISES: CPT

## 2018-10-02 PROCEDURE — 97530 THERAPEUTIC ACTIVITIES: CPT

## 2018-10-04 ENCOUNTER — HOSPITAL ENCOUNTER (OUTPATIENT)
Dept: PHYSICAL THERAPY | Age: 78
Setting detail: THERAPIES SERIES
Discharge: HOME | End: 2018-10-04
Payer: MEDICARE

## 2018-10-04 PROCEDURE — 97112 NEUROMUSCULAR REEDUCATION: CPT

## 2018-10-04 PROCEDURE — 97110 THERAPEUTIC EXERCISES: CPT

## 2018-10-09 ENCOUNTER — HOSPITAL ENCOUNTER (OUTPATIENT)
Dept: PHYSICAL THERAPY | Age: 78
Setting detail: THERAPIES SERIES
Discharge: HOME | End: 2018-10-09
Payer: MEDICARE

## 2018-10-09 PROCEDURE — 97112 NEUROMUSCULAR REEDUCATION: CPT

## 2018-10-09 PROCEDURE — 97110 THERAPEUTIC EXERCISES: CPT

## 2018-10-09 NOTE — FLOWSHEET NOTE
physical therapy appointments. Time was also taken on this day to answer all patient questions involving their participation in PT      Home Exercise Program:  Pt was educated on  HEP including distribution of handout describing exercises, sets, repetitions, frequency and intensity. Exercises/activities include: Ankle ABC's, HR/TR seated and standing, 4-way green TB ankle resistance, SLS, gastroc/soleus stretching       Manual Treatments:  9/19. Manual PROM left ankle, all directions, Grade II/III inf/post mobs, talocrural/subtalar x 5 mins. Modalities:      Timed Code Treatment Minutes:  30      Total Treatment Minutes:   30    Treatment/Activity Tolerance:  [x] Patient tolerated treatment well [] Patient limited by fatigue  [] Patient limited by pain  [] Patient limited by other medical complications  [] Other:     Prognosis: [x] Good [] Fair  [] Poor    Patient Requires Follow-up: [x] Yes  [] No    Plan:   [] Continue per plan of care [] Alter current plan (see comments)  [x] Plan of care initiated [] Hold pending MD visit [] Discharge  Plan for Next Session:  Balance, ankle ROM, manual therapy.   Work on hip strengthening up the chain, gait training    Electronically signed by:  Latisha Saul, PT

## 2018-10-11 ENCOUNTER — HOSPITAL ENCOUNTER (OUTPATIENT)
Dept: PHYSICAL THERAPY | Age: 78
Setting detail: THERAPIES SERIES
Discharge: HOME | End: 2018-10-11
Payer: MEDICARE

## 2018-10-11 PROCEDURE — 97530 THERAPEUTIC ACTIVITIES: CPT

## 2018-10-11 PROCEDURE — 97112 NEUROMUSCULAR REEDUCATION: CPT

## 2018-10-11 NOTE — FLOWSHEET NOTE
Physical Therapy Daily Treatment Note  Date:  10/11/2018    Patient Name:  Guerline Garber    :  1940  MRN: [id number]  Restrictions/Precautions:   Right ankle fracture 2018, Left ankle fracture 2018. Medical/Treatment Diagnosis Information:   · Diagnosis: Weakness both legs, acute left ankle pain   · Treatment Diagnosis: Impaired balance/proprioception, BLE ankle stability/strength, decreased gait mechanics     Tracking Information:  Physician Information Referring Practitioner: Mary Deng     Plan of Care Sent Date:  18  Signed Received:    Visit Count / Total Visits      Insurance Approved Visits  /  Approved Dates:     Insurance Information PT Insurance Information: Medicare     Progress Note/G-codes   []  Yes  [x]  No Next Due:  10th visit      Pain level:  1/10 left ankle      Subjective:   Pt reports his legs have been achy the past couple of days. He thinks it's the weather. Objective:   Observation:  . Pt had LE buckling episode in // bars after step exercises, pt fell almost to knees, assist with bars, and therapist to maintain safely. Pt was OK after rest.   Test measurements:      Exercises:  Exercise/Equipment Resistance/Repetitions Other comments   TM/Bike #1 level 3 x 3 mins     IB, gastroc/soleus, HR/TR Gastroc/Soleus 30\" x 2 each  HR/TR 2x 10     Balance // Bars Wobbleboard Ant/Post, Med/Lat x 20, DLS 12\"     Semi tandem eyes closed    Tandem stance 30\" x 2 B on Airex   Tandem with horizontal and vertical head turns B 2 x 30\"   Narrow NOELLE eyes closed  SLS 20\" x 2 B on Airex    4\" anterior step downx 5 B        Cable Column 3-way, 1.5 plates x 10 B     T.G.  . Diff performing    Step-Ups 6\" Fwd, Lat x 10 B   6\" step-up and over, ecc control x 10 B (BUE support) .   RLE buckling without UE support for FSU   Ambulation  1 lap ~133ft   Shuttle     [employer]  4 square step test with speed ladder x 5  Walking on turf no SPC x 100 ft

## 2018-10-16 ENCOUNTER — HOSPITAL ENCOUNTER (OUTPATIENT)
Dept: PHYSICAL THERAPY | Age: 78
Setting detail: THERAPIES SERIES
Discharge: HOME | End: 2018-10-16
Payer: MEDICARE

## 2018-10-16 PROCEDURE — 97110 THERAPEUTIC EXERCISES: CPT

## 2018-10-16 PROCEDURE — 97112 NEUROMUSCULAR REEDUCATION: CPT

## 2018-10-18 ENCOUNTER — HOSPITAL ENCOUNTER (OUTPATIENT)
Dept: PHYSICAL THERAPY | Age: 78
Setting detail: THERAPIES SERIES
Discharge: HOME | End: 2018-10-18
Payer: MEDICARE

## 2018-10-18 PROCEDURE — G8979 MOBILITY GOAL STATUS: HCPCS

## 2018-10-18 PROCEDURE — 97530 THERAPEUTIC ACTIVITIES: CPT

## 2018-10-18 PROCEDURE — G8978 MOBILITY CURRENT STATUS: HCPCS

## 2018-10-18 ASSESSMENT — PAIN DESCRIPTION - LOCATION: LOCATION: ANKLE

## 2018-10-18 ASSESSMENT — PAIN DESCRIPTION - ORIENTATION: ORIENTATION: RIGHT;LEFT

## 2018-10-18 ASSESSMENT — PAIN DESCRIPTION - PAIN TYPE: TYPE: ACUTE PAIN;CHRONIC PAIN

## 2018-10-18 ASSESSMENT — PAIN SCALES - GENERAL: PAINLEVEL_OUTOF10: 2

## 2018-10-23 ENCOUNTER — OFFICE VISIT (OUTPATIENT)
Dept: ORTHOPEDIC SURGERY | Age: 78
End: 2018-10-23
Payer: MEDICARE

## 2018-10-23 ENCOUNTER — HOSPITAL ENCOUNTER (OUTPATIENT)
Dept: PHYSICAL THERAPY | Age: 78
Setting detail: THERAPIES SERIES
Discharge: HOME | End: 2018-10-23
Payer: MEDICARE

## 2018-10-23 VITALS
HEART RATE: 60 BPM | WEIGHT: 189 LBS | HEIGHT: 70 IN | BODY MASS INDEX: 27.06 KG/M2 | SYSTOLIC BLOOD PRESSURE: 136 MMHG | DIASTOLIC BLOOD PRESSURE: 68 MMHG

## 2018-10-23 DIAGNOSIS — S82.832D OTHER CLOSED FRACTURE OF DISTAL END OF LEFT FIBULA WITH ROUTINE HEALING, SUBSEQUENT ENCOUNTER: Primary | ICD-10-CM

## 2018-10-23 DIAGNOSIS — S82.831D OTHER CLOSED FRACTURE OF DISTAL END OF RIGHT FIBULA WITH ROUTINE HEALING, SUBSEQUENT ENCOUNTER: ICD-10-CM

## 2018-10-23 PROCEDURE — 99213 OFFICE O/P EST LOW 20 MIN: CPT | Performed by: PHYSICIAN ASSISTANT

## 2018-10-23 PROCEDURE — G8484 FLU IMMUNIZE NO ADMIN: HCPCS | Performed by: PHYSICIAN ASSISTANT

## 2018-10-23 PROCEDURE — G8419 CALC BMI OUT NRM PARAM NOF/U: HCPCS | Performed by: PHYSICIAN ASSISTANT

## 2018-10-23 PROCEDURE — 1123F ACP DISCUSS/DSCN MKR DOCD: CPT | Performed by: PHYSICIAN ASSISTANT

## 2018-10-23 PROCEDURE — 97110 THERAPEUTIC EXERCISES: CPT

## 2018-10-23 PROCEDURE — 1101F PT FALLS ASSESS-DOCD LE1/YR: CPT | Performed by: PHYSICIAN ASSISTANT

## 2018-10-23 PROCEDURE — 97112 NEUROMUSCULAR REEDUCATION: CPT

## 2018-10-23 PROCEDURE — G8427 DOCREV CUR MEDS BY ELIG CLIN: HCPCS | Performed by: PHYSICIAN ASSISTANT

## 2018-10-23 PROCEDURE — 1036F TOBACCO NON-USER: CPT | Performed by: PHYSICIAN ASSISTANT

## 2018-10-23 PROCEDURE — 4040F PNEUMOC VAC/ADMIN/RCVD: CPT | Performed by: PHYSICIAN ASSISTANT

## 2018-10-23 NOTE — PROGRESS NOTES
Patient Name: Lila Coreas  Medical Record Number: [id number]  YOB: 1940  Date of Encounter: 10/23/2018     Chief Complaint   Patient presents with    Ankle Pain     Other closed fracture of distal end of left and right fibula with routine healing, subsequent encounter, DOI-8/4/18        History of Present Illness:   Mr. Lila Coreas is here in 6 month follow-up concerning his RIGHT distal fibula fracture that occurred after a mechanical fall on 4/19/2018 and 3 month follow-up concerning a LEFT distal fibula fracture he sustained during a mechanical fall on 8/4/2018. Patient is no longer using a walking boot. He is doing physical therapy. He denies having any pain except occasionally after physical therapy. He is wearing normal lace-up shoes. He states he is working with physical therapy on strengthening. The patient's past medical history, medications, allergies, family history, social history, and review of systems have been reviewed, and dated and are recorded in the chart under the 'MEDIA\" tab. Physical Exam:    Mr. Lila Coreas appears well, he is in no apparent distress, he demonstrates appropriate mood & affect. He is alert and oriented to person, place and time. /68   Pulse 60   Ht 5' 10\" (1.778 m)   Wt 189 lb (85.7 kg)   BMI 27.12 kg/m²     On examination of patient's ankles bilaterally there is no swelling or joint effusion. He denies tenderness on palpation over the distal fibula bilaterally. He has great range of motion with 4+/5 motor strength with movement of his ankles bilaterally. There is no edema or erythema in the affected extremity. There are no signs/symptoms of DVT/PE or infection    Radiology:  X-rays obtained and reviewed in office:     Views: 3 view left ankle including AP, lateral and oblique  Impression: Patient has a healing distal fibula fracture with evidence of callus formation.     Views: 3 view right ankle including AP, lateral and oblique  Impression: Patient has a healed distal fibular fracture. Orders:  Orders Placed This Encounter   Procedures    XR ANKLE LEFT (MIN 3 VIEWS)    XR ANKLE RIGHT (MIN 3 VIEWS)       Impression:   Diagnosis Orders   1. Other closed fracture of distal end of left fibula with routine healing, subsequent encounter  XR ANKLE LEFT (MIN 3 VIEWS)    XR ANKLE RIGHT (MIN 3 VIEWS)   2. Other closed fracture of distal end of right fibula with routine healing, subsequent encounter  XR ANKLE LEFT (MIN 3 VIEWS)    XR ANKLE RIGHT (MIN 3 VIEWS)       Treatment Plan:    Patient is doing well status post bilateral distal fibular fractures. His most recent fracture was his left ankle. He denies having any pain and is still working with physical therapy on strengthening, balance and coordination. He will continue working with physical therapy as long as needed. He did purchase a cane to help prevent falls. I do not believe patient will require a scheduled follow-up visit but will return at any time with any concerns    Sandra Proctor was informed of the results of any imaging. We discussed treatment options and a time was given to answer questions. A plan was proposed and Sandra Proctor understand and accepts this course of care. Electronically signed by Lyssa Brown PA-C on 88/83/9729  Board Certified [facility]    Please note that portions of this note were completed with a voice recognition program.  Efforts were made to edit the dictations but occasionally words are mis-transcribed.

## 2018-10-25 ENCOUNTER — HOSPITAL ENCOUNTER (OUTPATIENT)
Dept: PHYSICAL THERAPY | Age: 78
Setting detail: THERAPIES SERIES
Discharge: HOME | End: 2018-10-25
Payer: MEDICARE

## 2018-10-25 PROCEDURE — 97110 THERAPEUTIC EXERCISES: CPT

## 2018-10-25 PROCEDURE — 97140 MANUAL THERAPY 1/> REGIONS: CPT

## 2018-10-30 ENCOUNTER — HOSPITAL ENCOUNTER (OUTPATIENT)
Dept: PHYSICAL THERAPY | Age: 78
Setting detail: THERAPIES SERIES
Discharge: HOME | End: 2018-10-30
Payer: MEDICARE

## 2018-10-30 PROCEDURE — 97112 NEUROMUSCULAR REEDUCATION: CPT

## 2018-10-30 PROCEDURE — 97110 THERAPEUTIC EXERCISES: CPT

## 2018-11-01 ENCOUNTER — HOSPITAL ENCOUNTER (OUTPATIENT)
Dept: PHYSICAL THERAPY | Age: 78
Setting detail: THERAPIES SERIES
Discharge: HOME | End: 2018-11-01
Payer: MEDICARE

## 2018-11-01 PROCEDURE — 97112 NEUROMUSCULAR REEDUCATION: CPT

## 2018-11-01 PROCEDURE — 97110 THERAPEUTIC EXERCISES: CPT

## 2018-11-06 ENCOUNTER — HOSPITAL ENCOUNTER (OUTPATIENT)
Dept: PHYSICAL THERAPY | Age: 78
Setting detail: THERAPIES SERIES
Discharge: HOME | End: 2018-11-06
Payer: MEDICARE

## 2018-11-06 PROCEDURE — 97140 MANUAL THERAPY 1/> REGIONS: CPT

## 2018-11-06 PROCEDURE — 97110 THERAPEUTIC EXERCISES: CPT

## 2018-11-06 NOTE — FLOWSHEET NOTE
directed the patient's care, made skilled judgement, and was responsible for assessment and treatment of the patient.

## 2018-11-08 ENCOUNTER — HOSPITAL ENCOUNTER (OUTPATIENT)
Dept: PHYSICAL THERAPY | Age: 78
Setting detail: THERAPIES SERIES
Discharge: HOME | End: 2018-11-08
Payer: MEDICARE

## 2018-11-08 PROCEDURE — 97140 MANUAL THERAPY 1/> REGIONS: CPT

## 2018-11-08 PROCEDURE — 97110 THERAPEUTIC EXERCISES: CPT

## 2018-11-13 ENCOUNTER — HOSPITAL ENCOUNTER (OUTPATIENT)
Dept: PHYSICAL THERAPY | Age: 78
Setting detail: THERAPIES SERIES
Discharge: HOME | End: 2018-11-13
Payer: MEDICARE

## 2018-11-13 PROCEDURE — 97110 THERAPEUTIC EXERCISES: CPT

## 2018-11-15 ENCOUNTER — HOSPITAL ENCOUNTER (OUTPATIENT)
Dept: PHYSICAL THERAPY | Age: 78
Setting detail: THERAPIES SERIES
Discharge: HOME | End: 2018-11-15
Payer: MEDICARE

## 2018-11-15 PROCEDURE — 97110 THERAPEUTIC EXERCISES: CPT

## 2018-11-15 NOTE — FLOWSHEET NOTE
Ambulation  1 lap ~133ft   Shuttle     [employer]       Ramps     Stairs HSS 2 x 30\" B  HF 2 x 30\" B    Mat Table Sit to stand, UE assist for ascent, no UE assist for descent x 10                Other Therapeutic Activities:  Pt was educated on PT POC, Diagnosis, Prognosis, pathomechanics, as well as treatment goals and options, and frequency/duration of scheduling future physical therapy appointments. Time was also taken on this day to answer all patient questions involving their participation in PT      Home Exercise Program: 11/15: Sit to stands   11/08: Pt given blue TB for progression of 4-way ankle exercise  11/01: Step up/downs on step at home with UE support. Pt was educated on  HEP including distribution of handout describing exercises, sets, repetitions, frequency and intensity. Exercises/activities include: Ankle ABC's, HR/TR seated and standing, 4-way green TB ankle resistance, SLS, gastroc/soleus stretching       Manual Treatments:   11/08: Manual PROM left ankle, all directions, Grade II/III inf/post mobs, talocrural/subtalar x 8 mins. 11/06:  Manual PROM left ankle, all directions, Grade II/III inf/post mobs, talocrural/subtalar x 10 mins. 10/25: Therastick to the B HS x 12 min  9/19. Manual PROM left ankle, all directions, Grade II/III inf/post mobs, talocrural/subtalar x 5 mins. Modalities:      Timed Code Treatment Minutes:  28      Total Treatment Minutes:   28    Treatment/Activity Tolerance:  [x] Patient tolerated treatment well [] Patient limited by fatigue  [] Patient limited by pain  [] Patient limited by other medical complications  [x] Other: Advised pt to call MD regarding BP-please follow up next visit.      Prognosis: [x] Good [] Fair  [] Poor    Patient Requires Follow-up: [x] Yes  [] No    Plan:   [x] Continue per plan of care [] Alter current plan (see comments)  [] Plan of care initiated [] Hold pending MD visit [] Discharge  Plan for Next Session:  11/01: May try stepping

## 2018-11-20 ENCOUNTER — HOSPITAL ENCOUNTER (OUTPATIENT)
Dept: PHYSICAL THERAPY | Age: 78
Setting detail: THERAPIES SERIES
Discharge: HOME | End: 2018-11-20
Payer: MEDICARE

## 2018-11-20 PROCEDURE — 97110 THERAPEUTIC EXERCISES: CPT

## 2018-11-20 PROCEDURE — 97112 NEUROMUSCULAR REEDUCATION: CPT

## 2018-11-20 NOTE — FLOWSHEET NOTE
eccentric control with sit to stand this date    Prognosis: [x] Good [] Fair  [] Poor    Patient Requires Follow-up: [x] Yes  [] No    Plan:   [x] Continue per plan of care [] Alter current plan (see comments)  [] Plan of care initiated [] Hold pending MD visit [] Discharge  Plan for Next Session:  11/01: May try stepping over cone in // bars, consider DF mobs. Balance, ankle ROM, manual therapy. Work on hip strengthening up the chain, gait training; New POC up to 2x/week for 4 weeks if needed.     Electronically signed by: Fide Solis, PT, DPT

## 2018-11-27 ENCOUNTER — HOSPITAL ENCOUNTER (OUTPATIENT)
Dept: PHYSICAL THERAPY | Age: 78
Setting detail: THERAPIES SERIES
Discharge: HOME | End: 2018-11-27
Payer: MEDICARE

## 2018-11-27 PROCEDURE — 97110 THERAPEUTIC EXERCISES: CPT

## 2018-11-27 PROCEDURE — 97112 NEUROMUSCULAR REEDUCATION: CPT

## 2018-11-29 ENCOUNTER — HOSPITAL ENCOUNTER (OUTPATIENT)
Dept: PHYSICAL THERAPY | Age: 78
Setting detail: THERAPIES SERIES
Discharge: HOME | End: 2018-11-29
Payer: MEDICARE

## 2018-11-29 PROCEDURE — 97530 THERAPEUTIC ACTIVITIES: CPT

## 2018-11-29 PROCEDURE — G8980 MOBILITY D/C STATUS: HCPCS

## 2018-11-29 PROCEDURE — G8978 MOBILITY CURRENT STATUS: HCPCS

## 2018-11-29 PROCEDURE — G8979 MOBILITY GOAL STATUS: HCPCS

## 2018-11-29 NOTE — PROGRESS NOTES
Outpatient Physical Therapy    Phone: [phone] Fax: [phone]    Physical Therapy Discharge Note  Date: 2018        Patient Name:  Neris Bahena    :  1940  MRN: [id number]  Restrictions/Precautions:    Medical/Treatment Diagnosis Information:  · Diagnosis: Weakness both legs, acute left ankle pain   · Treatment Diagnosis: Impaired balance/proprioception, BLE ankle stability/strength, decreased gait mechanics   Insurance/Certification information:  PT Insurance Information: Medicare   Physician Information:  Referring Practitioner: Ti Wayne PA-C, [facility] of care signed (Y/N): Y   Visit# / total visits:    Pain level: 0/10     G-Code (if applicable):      Date G-Code Applied:  2018  PT G-Codes  Functional Assessment Tool Used: PT Assessment  Functional Limitation: Mobility: Walking and moving around  Mobility: Walking and Moving Around Current Status (): At least 1 percent but less than 20 percent impaired, limited or restricted  Mobility: Walking and Moving Around Goal Status (): 0 percent impaired, limited or restricted  Mobility: Walking and Moving Around Discharge Status (): At least 1 percent but less than 20 percent impaired, limited or restricted     Time Period for Report: 18 - 18   Cancels/No-shows to date:  0    Plan of Care/Treatment to date:  [x] Therapeutic Exercise      [] Modalities:  [x] Therapeutic Activity       [] Ultrasound    [x] Gait Training        [] Cervical Traction   [x] Neuromuscular Re-education      [] Cold/hotpack    [x] Instruction in HEP        [] Lumbar Traction  [x] Manual Therapy        [] Electrical Stimulation            [] Aquatic Therapy        [] Iontophoresis        ? [] Lymphedema management  [] Women's Health     Other:  [] Vestibular Rehab        []                     ?       Significant Findings At Last Visit/Comments:    Subjective:  Subjective  Subjective: Pt reports that his ankles are not bothering him anymore and the issue is his migraine. Pt states he has made 99% improvement since starting PT with his ankles. Pt reports his blood pressure was great this morning and was able to walk 1 mile with no issues. Pt reports he doesn't have any limitations with activities when it comes to his ankles. Pt feels like he can be discharged at this date. Pain Screening  Patient Currently in Pain: No         Objective:  Observation/Palpation  Posture: Good  AROM LLE (degrees)  L Ankle Dorsiflexion 0-20: 10  L Ankle Plantar Flexion 0-45: 45  L Ankle Forefoot Inversion 0-40: 27  L Ankle Forefoot Eversion 0-20: 10  AROM RLE (degrees)  R Ankle Dorsiflexion 0-20: 15 deg  R Ankle Plantar Flexion 0-45: 45 deg  R Ankle Forefoot Inversion 0-40: 40  R Ankle Forefoot Eversion 0-20: 11  Strength RLE  Comment: Ankle DF:  5/5, Eversion:  5/5 Inv: 5/5  Hip Flexion:  4-/5 Knee Flex: 5/5 Knee Ext: 5/5 Hip Abd 5/5   Strength LLE  Comment: Ankle DF:  5/5, Eversion:  5/5 Inv: 5/5  Hip Flexion:  4-/5 Knee Flex: 5/5 Knee Ext: 5/5 Hip Abd 5/5   Balance  Tandem Stance R Le  Tandem Stance L Leg: 10  Single Leg Stance R Le  Single Leg Stance L Le     Assessment:  Conditions Requiring Skilled Therapeutic Intervention  Body structures, Functions, Activity limitations: Decreased functional mobility , Decreased ROM, Decreased strength, Decreased balance  Assessment: Pt has received 20 skilled PT visits to work on B ankle ROM, BLE strength, and balance due to bilateral ankle fractures. Pt has shown improvement in B ankle ROM, decreased pain, increased balance on RLE, increased B LE strength. Pt shows some deficits with balance on LLE but did demonstrate slight improvement since last tested. Pt was discharged at this date with HEP and 30 day pass to [facility].   Treatment Diagnosis: Impaired balance/proprioception, BLE ankle stability/strength, decreased gait mechanics   Prognosis: Good  Decision Making: the patient's care, made skilled judgement, and was responsible for assessment and treatment of the patient. If you have any questions or concerns, please don't hesitate to call.   Thank you for your referral.    Physician Signature:________________________________Date:__________________  By signing above, therapists plan is approved by physician

## 2019-05-20 ENCOUNTER — OFFICE VISIT (OUTPATIENT)
Dept: ORTHOPEDIC SURGERY | Age: 79
End: 2019-05-20
Payer: MEDICARE

## 2019-05-20 VITALS
SYSTOLIC BLOOD PRESSURE: 120 MMHG | BODY MASS INDEX: 27.06 KG/M2 | DIASTOLIC BLOOD PRESSURE: 72 MMHG | HEIGHT: 70 IN | WEIGHT: 189 LBS | HEART RATE: 71 BPM | TEMPERATURE: 98.4 F

## 2019-05-20 DIAGNOSIS — M47.816 SPONDYLOSIS OF LUMBAR REGION WITHOUT MYELOPATHY OR RADICULOPATHY: Primary | ICD-10-CM

## 2019-05-20 DIAGNOSIS — M65.342 TRIGGER FINGER, LEFT RING FINGER: ICD-10-CM

## 2019-05-20 PROCEDURE — 4040F PNEUMOC VAC/ADMIN/RCVD: CPT | Performed by: PHYSICIAN ASSISTANT

## 2019-05-20 PROCEDURE — G8419 CALC BMI OUT NRM PARAM NOF/U: HCPCS | Performed by: PHYSICIAN ASSISTANT

## 2019-05-20 PROCEDURE — 99214 OFFICE O/P EST MOD 30 MIN: CPT | Performed by: PHYSICIAN ASSISTANT

## 2019-05-20 PROCEDURE — 1123F ACP DISCUSS/DSCN MKR DOCD: CPT | Performed by: PHYSICIAN ASSISTANT

## 2019-05-20 PROCEDURE — 1036F TOBACCO NON-USER: CPT | Performed by: PHYSICIAN ASSISTANT

## 2019-05-20 PROCEDURE — 20550 NJX 1 TENDON SHEATH/LIGAMENT: CPT | Performed by: PHYSICIAN ASSISTANT

## 2019-05-20 PROCEDURE — G8427 DOCREV CUR MEDS BY ELIG CLIN: HCPCS | Performed by: PHYSICIAN ASSISTANT

## 2019-05-21 PROBLEM — M65.342 TRIGGER FINGER, LEFT RING FINGER: Status: ACTIVE | Noted: 2019-05-21

## 2019-05-21 PROBLEM — M47.816 SPONDYLOSIS OF LUMBAR REGION WITHOUT MYELOPATHY OR RADICULOPATHY: Status: ACTIVE | Noted: 2019-05-21

## 2019-05-21 NOTE — PROGRESS NOTES
Subjective:      Patient ID: Francisca Michel is a [de-identified] y.o.  male. Chief Complaint   Patient presents with    Back Pain    Trigger finger     left ring trigger        HPI:  He is here for follow-up regarding her low back pain and buttock pain. Denies any history of injury. Denies any significant numbness or tingling lower extremities are perceived weakness. All activities including sitting, standing and walking aggravates his pain. Pain currently in the low back buttock region is 5/10. He was evaluated and treated for lumbar stenosis back in February 2018 with epidural steroid injections with significant relief. He states his pain is exactly the same as he had at that time. He also wants to be evaluated for left hand/  ring finger pain as well as locking. Onset of symptoms weeks. These symptoms have not been progressive in nature. There is no history of injury. Pain is moderate. Location of pain- left Ring finger. Pain is worse with movement. Pain improves with ice and elevation. There is not associated numbness/ tingling. Previous treatments for the left hand have included: Massage and ice with minimal relief      Review of Systems:   A 14 point review of systems and history form completed by the patient has been reviewed. This form is scanned in the media tab of the patient's chart under today's date.     Past Medical History:   Diagnosis Date    Anxiety     Arthritis     Diabetes mellitus ([address]     High blood pressure     Hyperlipidemia        Family History   Problem Relation Age of Onset    Cancer Other     Diabetes Other     High Blood Pressure Other        Past Surgical History:   Procedure Laterality Date    COLONOSCOPY      CYST REMOVAL Left 5/21/15    FOOT SURGERY Left     cyst removed from foot    JOINT REPLACEMENT Right     shoulder    OTHER SURGICAL HISTORY      tumor removed from neck    SHOULDER ARTHROPLASTY Right 2/17/15    SHOULDER SURGERY Bilateral     WRIST SURGERY         Social History     Occupational History    Occupation: Retired   Tobacco Use    Smoking status: Former Smoker     Last attempt to quit: 1975     Years since quittin.0    Smokeless tobacco: Never Used   Substance and Sexual Activity    Alcohol use: No    Drug use: No    Sexual activity: Not on file       Current Outpatient Medications   Medication Sig Dispense Refill    metFORMIN (GLUCOPHAGE) 1000 MG tablet Take 1,000 mg by mouth 2 times daily (with meals)      zolpidem (AMBIEN) 10 MG tablet Take 10 mg by mouth nightly      valsartan-hydrochlorothiazide (DIOVAN-HCT) 160-12.5 MG per tablet Take 1 tablet by mouth daily.  glipiZIDE (GLUCOTROL) 5 MG tablet Take 5 mg by mouth.  simvastatin (ZOCOR) 40 MG tablet Take 40 mg by mouth nightly.  sertraline (ZOLOFT) 25 MG tablet Take 25 mg by mouth nightly.  Omega-3 Fatty Acids (FISH OIL) 1000 MG CPDR   Take 1,000 mg by mouth 2 times daily       aspirin 81 MG tablet Take 81 mg by mouth daily. No current facility-administered medications for this visit. .    Objective:   He  is  oriented to person, place and time, pleasant, well nourished, developed and in no acute distress. /72   Pulse 71   Temp 98.4 °F (36.9 °C) (Temporal)   Ht 5' 10\" (1.778 m)   Wt 189 lb (85.7 kg)   BMI 27.12 kg/m²        LUMBAR SPINE EXAM:  Examination of the Lumbar spine shows:  Deformity Absent . Soft Tissue Swelling Absent . Soft Tissue Tenderness Absent . Midline Bone Tenderness Absent . Paraspinal Muscular Spasm Absent . Previous Incisions Absent . Erythema Absent . Lumbar Flexion does produce pain. Lumbar Extension does produce pain. NEUROLOGICAL EXAM:  SLR     Left: Negative. Right Negative. DTR 1+ bilaterally patella and Achilles. Motor Strength Exam:  5/5 in all major motor groups of the lower extremities. Barrera's Sign Absent   Gait normal Heel/ Toe.   Sensation to Touch normal    VASCULAR EXAM:  Examination of the upper and lower extremities shows intact perfusion to all extremities, no cyanosis, digits are warm to touch, capillary refill is less than 2 seconds. No significant edema noted. SKIN:  Examination of the skin reveals the skin to be intact without lacerations, abrasions, significant erythema, rashes or skin lesions. Left Hand Exam:  Skin: Skin color, texture, turgor normal. No rashes or lesions bilaterally   Digital range of motion is full and equal bilateral .   There is not an associated flexion contracture of the IP joint. No other digit demonstrates evidence for stenosing tenosynovitis bilaterally. Wrist range of motion is full and equal bilateral.   Sensation is normal in the Whole Hand bilaterally   Vascular examination reveals normal and good capillary refill bilaterally   Swelling is minimal in the symptomatic digit, absent elsewhere bilaterally   Examination for Stenosing Tenosynovitis demonstrates moderate tenderness, thickening & nodularity at the A-1 pulley(s) of the left 4th finger. There is a palpable Nota's Node. No other digits demonstrate evidence of Stenosing Tenosynovitis. Examination of the first Carpo-Metacarpal Joints of the wrist demonstrates no radial subluxation of the Thumb Metacarpal base upon the Trapezium. There is no significant pain with palpation or crepitance at the [facility] joint line. X Rays: not performed in the office today:       Assessment:       ICD-10-CM    1. Spondylosis of lumbar region without myelopathy or radiculopathy M47.816 Hiram Stubbs MD, Spine Surgery, [facility]   2. Trigger finger, left ring finger M65.342 [id number] - DE INJECT TRIGGER POINT, 1 OR 2     DE TRIAMCINOLONE ACETONIDE INJ        Plan:     Is having chronic pain due to lumbar stenosis. He will be referred back to Dr. Eduardo Hunter for consideration of lumbar epidural steroid injections. He is having triggering of the left ring finger.     The natural history of the patient's diagnosis as well as the treatment options were discussed in full and questions were answered. Risks and benefits of the treatment options also reviewed in detail. Discussed injection of cortisone as well as the use of NSAID's. Discussed surgical treatment, Trigger Finger Release, if symptoms fail to improve with conservative treatment or if symptoms continue to return after conservative treatment. Cortisone Injection   Trigger Finger    PROCEDURE NOTE:   Pre op Diagnosis: Left Ring  Trigger Finger   Post op Diagnosis: Same   With his permission, the  Left Ring finger was prepped in standard sterile fashion with Alcohol and 2 cc of 0.25% Marcaine and 1 cc of Kenalog 40 mg was injected into the Ring finger A1 pulley/ tendon sheath region without difficulty. He tolerated this well without difficulty. A band-aid was applied. The patient was advised to ice the area for 15-20 minutes to relieve any injection site related pain. Follow Up:   Call or return to clinic prn if these symptoms worsen or fail to improve as anticipated.

## 2019-05-23 ENCOUNTER — OFFICE VISIT (OUTPATIENT)
Dept: ORTHOPEDIC SURGERY | Age: 79
End: 2019-05-23
Payer: MEDICARE

## 2019-05-23 DIAGNOSIS — M54.16 LUMBAR RADICULOPATHY: ICD-10-CM

## 2019-05-23 DIAGNOSIS — M51.26 HNP (HERNIATED NUCLEUS PULPOSUS), LUMBAR: Primary | ICD-10-CM

## 2019-05-23 DIAGNOSIS — M47.816 SPONDYLOSIS OF LUMBAR REGION WITHOUT MYELOPATHY OR RADICULOPATHY: ICD-10-CM

## 2019-05-23 PROCEDURE — 99214 OFFICE O/P EST MOD 30 MIN: CPT | Performed by: PHYSICAL MEDICINE & REHABILITATION

## 2019-05-23 PROCEDURE — 4040F PNEUMOC VAC/ADMIN/RCVD: CPT | Performed by: PHYSICAL MEDICINE & REHABILITATION

## 2019-05-23 PROCEDURE — 1123F ACP DISCUSS/DSCN MKR DOCD: CPT | Performed by: PHYSICAL MEDICINE & REHABILITATION

## 2019-05-23 PROCEDURE — 1036F TOBACCO NON-USER: CPT | Performed by: PHYSICAL MEDICINE & REHABILITATION

## 2019-05-23 PROCEDURE — G8427 DOCREV CUR MEDS BY ELIG CLIN: HCPCS | Performed by: PHYSICAL MEDICINE & REHABILITATION

## 2019-05-23 PROCEDURE — G8419 CALC BMI OUT NRM PARAM NOF/U: HCPCS | Performed by: PHYSICAL MEDICINE & REHABILITATION

## 2019-05-23 NOTE — LETTER
Please schedule the following with:     Date:   2019 @ 10:30    2019 @ 11:00    Account: [de-identified]  Patient: Sim Weaver    : 1940  Address:  [address]    Phone (H):  [phone] (home) [phone] (work)     ----------------------------------------------------------------------------------------------  Diagnosis:     ICD-10-CM    1. HNP (herniated nucleus pulposus), lumbar M51.26    2. Lumbar radiculopathy M54.16    3. Spondylosis of lumbar region without myelopathy or radiculopathy M47.816          Levels:L4  Procedure type TRANSFORAMINAL   Side BILATERAL  CPT Codes 78137    ----------------------------------------------------------------------------------------------  Injection #   [id number] [facility]    Attending Physician       Miquel Clemente.  Kerwin Jara MD.      ----------------------------------------------------------------------------------------------  Injection Scheduled For:    At:    1st Insurance MEDICARE [employer]    Pre-Cert#    2nd Insurance A[employer]    Pre-Cert#    Comments or Special instructions:    · Infection control  · Tested positive for MRSA in past 12 months:  no  · Tested positive for MSSA \"staph infection\" in past 12 months: no  · Tested positive for VRE (Vancomycin Resistant Enterococci) in past 12 months:   no  · Currently on any antibiotics for an infection: no  · Anticoagulants:  · On a blood thinner:  yes ASPIRIN 81  · Any history of bleeding disorder: no   · Advanced Liver disease: no   · Advanced Renal disease: no   · Glaucoma: no   · Diabetes: yes    Sedation:  No  -----------------------------------------------------------------------------------------------  Allergies   Allergen Reactions    Codeine Other (See Comments)     Hallucinations    Penicillins

## 2019-05-23 NOTE — PROGRESS NOTES
Follow up: [address]  1940  [id number]         Chief Complaint   Patient presents with    Back Pain     F/U LSP, no new injury, pain progressively worse over the last 6 months, was last seen 3/2018, had LAUREN TF 3/7/18    Hip Pain     bilateral         HISTORY OF PRESENT ILLNESS:  Mr. Nayla Quinn is a [de-identified] y.o. male returns for a follow up visit for multiple medical problems. His current presenting problems are   1. HNP (herniated nucleus pulposus), lumbar    2. Lumbar radiculopathy    3. Spondylosis of lumbar region without myelopathy or radiculopathy    . As per information/history obtained from the PADT(patient assessment and documentation tool) - He complains of pain in the lower back with radiation to the hips Bilateral He rates the pain 6/10 and describes it as sharp, aching. Pain is made worse by: standing, sitting. He denies side effects from the current pain regimen. Patient reports that since the last follow up visit the physical functioning is worse, family/social relationships are worse, mood is worse and sleep patterns are worse, and that the overall functioning is worse. Patient denies neurological bowel or bladder. Patient reports today following up on his lumbar spine. He was last seen in March of 2018. He reports the pain has getting progressively worse over the last 6 months. He underwent an epidural injection on 3/7/18 which helped. He denies any new injuries to her back. He did however fracture both of his ankles in October about 6 weeks apart. He reports he has recovered well from the fractures of his ankles. He does state though his back pain has been worsening and now radiates to both hips.       Associated signs and symptoms:   Neurogenic bowel or bladder symptoms:  no   Perceived weakness:  yes   Difficulty walking:  yes              Past Medical History:   Past Medical History:   Diagnosis Date    Anxiety     Arthritis     Diabetes mellitus ([facility] [address]     High blood L5/S1 tenderness  Bursal tenderness No tenderness bilaterally  There is no paraspinal spasm. · Range of Motion: limited by 25% in all planes due to pain  · Strength:   Strength testing is 5/5 in all muscle groups tested. · Special Tests:   Straight leg raise and crossed SLR negative. · Skin: There are no rashes, ulcerations or lesions. · Reflexes: Reflexes are symmetrically 1+ at the patellar and ankle tendons. Clonus absent bilaterally at the feet. · Gait & station: normal, patient ambulates without assistance  · Additional Examinations:  · RIGHT LOWER EXTREMITY: Inspection/examination of the right lower extremity does not show any tenderness, deformity or injury. Range of motion is normal and pain-free. There is no gross instability. There are no rashes, ulcerations or lesions. Strength and tone are normal. No atrophy or abnormal movements are noted. · LEFT LOWER EXTREMITY:  Inspection/examination of the left lower extremity does not show any tenderness, deformity or injury. Range of motion is normal and pain-free. There is no gross instability. There are no rashes, ulcerations or lesions. Strength and tone are normal. No atrophy or abnormal movements are noted. Diagnostic Testing:    MR Lumbar spine shows L4 5 disc protrusion with foraminal stenosis noted at L5-S1 spondylolisthesis  Results for orders placed or performed during the hospital encounter of 03/07/18   POCT Glucose   Result Value Ref Range    POC Glucose 111 (H) 70 - 99 mg/dl    Performed on ACCU-CHEK      Impression:       1. HNP (herniated nucleus pulposus), lumbar    2. Lumbar radiculopathy    3. Spondylosis of lumbar region without myelopathy or radiculopathy        Plan:  Clinical Course: Above diagnoses are worsening    I discussed the diagnosis and the treatment options with Dario Armstrong today.      In Summary:  The various treatment options were outlined and discussed with Dario Armstrong including:  Conservative care options: physical therapy, ice, medications, bracing, and activity modification. The indications for therapeutic injections. The indications for additional imaging/laboratory studies. The indications for (possible future) interventions. After considering the various options discussed, Ashley Marin elected to pursue a course of treatment that includes the followin. Medications:  Continue anti-inflammatories with appropriate GI Precautions including to stop if develop dark tarry stools or GI upset and to take with food. 2. PT:  Encouraged to continue with HEP. 3. Further studies:  No further studies. 4. Interventional:  We discussed pursuing a bilateral L4 TF epidural steroid injection to address the pain. Radiologic imaging and symptoms confirm the pain etiology. Risks, benefits and alternatives of interventional options were discussed. These include and are not limited to bleeding, infection, spinal headache, nerve injury and lack of pain relief. The patient verbalized understanding and would like to proceed. The patient will be scheduled accordingly. 5. Follow up:  4-6 weeks      Ashley Marin was instructed to call the office if his symptoms worsen or if new symptoms appear prior to the next scheduled visit. He is specifically instructed to contact the office between now & his scheduled appointment if he has concerns related to his condition or if he needs assistance in scheduling the above tests. He is welcome to call for an appointment sooner if he has any additional concerns or questions. IOtilio, am scribing for and in the presence of Dr. Wood Elias. 19 3:24 PM  Otilio Weston ATC, Elisabeth Baker, Dr. Iris Venegas. Cherelle, personally performed the services described in this documentation as scribed by SILVIANO Haynes in my presence and it is both accurate and complete. Whit Montanez.  Juan C Nieto MD, JAMES, [facility]  Board Certified in [address]

## 2019-06-05 ENCOUNTER — TELEPHONE (OUTPATIENT)
Dept: ORTHOPEDIC SURGERY | Age: 79
End: 2019-06-05

## 2019-06-05 NOTE — TELEPHONE ENCOUNTER
Auth: NPR  Date: 6/12/19  CPT: 04568, 48 Modifier, 35720 26, 21509  DX: M51.26, M54.16, M47.816   Outpaitent  Procedure: Rafael  SX Location: [facility]   Insurance: Medicare  Physician: JANESSA

## 2019-06-12 ENCOUNTER — APPOINTMENT (OUTPATIENT)
Dept: GENERAL RADIOLOGY | Age: 79
End: 2019-06-12
Attending: PHYSICAL MEDICINE & REHABILITATION
Payer: MEDICARE

## 2019-06-12 ENCOUNTER — HOSPITAL ENCOUNTER (OUTPATIENT)
Age: 79
Setting detail: OUTPATIENT SURGERY
Discharge: HOME | End: 2019-06-12
Attending: PHYSICAL MEDICINE & REHABILITATION | Admitting: PHYSICAL MEDICINE & REHABILITATION
Payer: MEDICARE

## 2019-06-12 VITALS
OXYGEN SATURATION: 100 % | HEART RATE: 59 BPM | RESPIRATION RATE: 16 BRPM | DIASTOLIC BLOOD PRESSURE: 66 MMHG | TEMPERATURE: 97 F | SYSTOLIC BLOOD PRESSURE: 134 MMHG

## 2019-06-12 LAB
GLUCOSE BLD-MCNC: 136 MG/DL (ref 70–99)
PERFORMED ON: ABNORMAL

## 2019-06-12 PROCEDURE — 3209999900 FLUORO FOR SURGICAL PROCEDURES

## 2019-06-12 PROCEDURE — 3610000056 HC PAIN LEVEL 4 BASE (NON-OR): Performed by: PHYSICAL MEDICINE & REHABILITATION

## 2019-06-12 PROCEDURE — 6360000002 HC RX W HCPCS: Performed by: PHYSICAL MEDICINE & REHABILITATION

## 2019-06-12 PROCEDURE — 2500000003 HC RX 250 WO HCPCS: Performed by: PHYSICAL MEDICINE & REHABILITATION

## 2019-06-12 PROCEDURE — 2709999900 HC NON-CHARGEABLE SUPPLY: Performed by: PHYSICAL MEDICINE & REHABILITATION

## 2019-06-12 PROCEDURE — 99152 MOD SED SAME PHYS/QHP 5/>YRS: CPT | Performed by: PHYSICAL MEDICINE & REHABILITATION

## 2019-06-12 RX ORDER — MIDAZOLAM HYDROCHLORIDE 1 MG/ML
INJECTION INTRAMUSCULAR; INTRAVENOUS
Status: COMPLETED | OUTPATIENT
Start: 2019-06-12 | End: 2019-06-12

## 2019-06-12 RX ORDER — METHYLPREDNISOLONE ACETATE 80 MG/ML
INJECTION, SUSPENSION INTRA-ARTICULAR; INTRALESIONAL; INTRAMUSCULAR; SOFT TISSUE
Status: COMPLETED | OUTPATIENT
Start: 2019-06-12 | End: 2019-06-12

## 2019-06-12 RX ORDER — BUPIVACAINE HYDROCHLORIDE 5 MG/ML
INJECTION, SOLUTION PERINEURAL
Status: COMPLETED | OUTPATIENT
Start: 2019-06-12 | End: 2019-06-12

## 2019-06-12 RX ORDER — AMLODIPINE BESYLATE 5 MG/1
5 TABLET ORAL DAILY
Refills: 11 | COMMUNITY
Start: 2019-05-07 | End: 2019-10-10 | Stop reason: ALTCHOICE

## 2019-06-12 RX ORDER — LIDOCAINE HYDROCHLORIDE 10 MG/ML
INJECTION, SOLUTION INFILTRATION; PERINEURAL
Status: COMPLETED | OUTPATIENT
Start: 2019-06-12 | End: 2019-06-12

## 2019-06-12 ASSESSMENT — PAIN - FUNCTIONAL ASSESSMENT: PAIN_FUNCTIONAL_ASSESSMENT: 0-10

## 2019-06-12 ASSESSMENT — PAIN SCALES - GENERAL
PAINLEVEL_OUTOF10: 3
PAINLEVEL_OUTOF10: 5

## 2019-06-12 ASSESSMENT — PAIN DESCRIPTION - ORIENTATION
ORIENTATION: LOWER
ORIENTATION: LOWER

## 2019-06-12 ASSESSMENT — PAIN DESCRIPTION - PAIN TYPE: TYPE: CHRONIC PAIN

## 2019-06-12 ASSESSMENT — PAIN DESCRIPTION - LOCATION
LOCATION: BACK
LOCATION: BACK

## 2019-06-12 NOTE — PROGRESS NOTES
Patient in chair, stated ready to go home & did not feel he needed wheelchair, he stood up strong & took couple steps & I was holding his arm & he stated that \"My legs are going out\" & he lowered to floor with me assisting him & he denied injury from slowly sitting on the floor. Assisted with myself & another RN up on feet & pivoted into chair. Vital signs stable. Dr Case Herr came to evaluate him prior to dismissal & he told her that his legs have \"given out\" @ times @ home & dr Aly Multani told him to use walker. Discharged via w/c to car & his  aware legs weak & to assist his as needed & he verbalized understanding.

## 2019-06-12 NOTE — H&P
HISTORY AND PHYSICAL/PRE-SEDATION ASSESSMENT    Patient:  Jay Coburn   :  1940  Medical Record No.:  [id number]   Date:  2019  Physician:  Saulo Russo M.D. Facility: [address] [place] [address]    HISTORY OF PRESENT ILLNESS:                 The patient is a [de-identified] y.o. male whom presents with lower back and bilateral hip pain. Review of the imaging and physical exam of the patient confirmed the pre-procedure diagnosis. After a thorough discussion of risks, benefits and alternatives informed consent was obtained. Past Medical History:   Past Medical History:   Diagnosis Date    Anxiety     Arthritis     Diabetes mellitus ([facility] [address]     High blood pressure     Hyperlipidemia       Past Surgical History:     Past Surgical History:   Procedure Laterality Date    COLONOSCOPY      CYST REMOVAL Left 5/21/15    FOOT SURGERY Left     cyst removed from foot    JOINT REPLACEMENT Right     shoulder    OTHER SURGICAL HISTORY      tumor removed from neck    SHOULDER ARTHROPLASTY Right 2/17/15    SHOULDER SURGERY Bilateral     WRIST SURGERY       Current Medications:   Prior to Admission medications    Medication Sig Start Date End Date Taking? Authorizing Provider   amLODIPine (NORVASC) 5 MG tablet Take 5 mg by mouth daily 19  Yes Historical Provider, MD   metFORMIN (GLUCOPHAGE) 1000 MG tablet Take 1,000 mg by mouth 2 times daily (with meals)   Yes Historical Provider, MD   zolpidem (AMBIEN) 10 MG tablet Take 10 mg by mouth nightly   Yes Historical Provider, MD   valsartan-hydrochlorothiazide (DIOVAN-HCT) 160-12.5 MG per tablet Take 1 tablet by mouth daily. Yes Historical Provider, MD   glipiZIDE (GLUCOTROL) 5 MG tablet Take 5 mg by mouth. Yes Historical Provider, MD   simvastatin (ZOCOR) 40 MG tablet Take 40 mg by mouth nightly. Yes Historical Provider, MD   sertraline (ZOLOFT) 25 MG tablet Take 25 mg by mouth nightly.    Yes Historical Provider, MD   Omega-3 Fatty Acids (FISH OIL) 1000 MG CPDR   Take 1,000 mg by mouth 2 times daily     Historical Provider, MD   aspirin 81 MG tablet Take 81 mg by mouth daily. Historical Provider, MD     Allergies:  Codeine and Penicillins  Social History:    reports that he quit smoking about 44 years ago. He has never used smokeless tobacco. He reports that he does not drink alcohol or use drugs. Family History:   Family History   Problem Relation Age of Onset    Cancer Other     Diabetes Other     High Blood Pressure Other        Vitals: Blood pressure 130/65, pulse 58, temperature 97 °F (36.1 °C), temperature source Temporal, resp. rate 18, SpO2 99 %. PHYSICAL EXAM:including affected areas  HENT: Airway patent and reviewed  Cardiovascular: Normal rate, regular rhythm, normal heart sounds. Pulmonary/Chest: No wheezes. No rhonchi. No rales. Abdominal: Soft. Bowel sounds are normal. No distension. Extremities: Moves all extremities equally  Cervical and Lumbar Spine: Painful range of motion, no midline tenderness       Diagnosis:Lumbar radiculopathy  M51.26   M54.16   M47.816    Plan: Proceed with planned procedure      ASA CLASS:         []   I. Normal, healthy adult           [x]   II.  Mild systemic disease            []   III. Severe systemic disease      Mallampati: Mallampati Class II - (soft palate, fauces & uvula are visible)      Sedation plan:   [x]  Local              []  Minimal                  []  General anesthesia    Patient's condition acceptable for planned procedure/sedation. Post Procedure Plan   Return to same level of care   ______________________     The risks and benefits as well as alternatives to the procedure have been discussed with the patient and or family. The patient and or next of kin understands and agrees to proceed.     Jamia Brown M.D.

## 2019-06-19 ENCOUNTER — TELEPHONE (OUTPATIENT)
Dept: ORTHOPEDIC SURGERY | Age: 79
End: 2019-06-19

## 2019-06-19 NOTE — TELEPHONE ENCOUNTER
DOS   06/26/2019  CPT   30788.50   11904.26  83827  OP SX AUTH  NPR    BILATERAL  LEVELS   L4   PROCEDURE   TRANSFORAMINAL LAUREN    [address]:   MEDICARE

## 2019-06-26 ENCOUNTER — HOSPITAL ENCOUNTER (OUTPATIENT)
Age: 79
Setting detail: OUTPATIENT SURGERY
Discharge: HOME | End: 2019-06-26
Attending: PHYSICAL MEDICINE & REHABILITATION | Admitting: PHYSICAL MEDICINE & REHABILITATION
Payer: MEDICARE

## 2019-06-26 ENCOUNTER — APPOINTMENT (OUTPATIENT)
Dept: GENERAL RADIOLOGY | Age: 79
End: 2019-06-26
Attending: PHYSICAL MEDICINE & REHABILITATION
Payer: MEDICARE

## 2019-06-26 VITALS
RESPIRATION RATE: 16 BRPM | BODY MASS INDEX: 27.55 KG/M2 | HEART RATE: 63 BPM | DIASTOLIC BLOOD PRESSURE: 72 MMHG | TEMPERATURE: 97.1 F | HEIGHT: 69 IN | OXYGEN SATURATION: 99 % | SYSTOLIC BLOOD PRESSURE: 115 MMHG | WEIGHT: 186 LBS

## 2019-06-26 LAB
GLUCOSE BLD-MCNC: 133 MG/DL (ref 70–99)
PERFORMED ON: ABNORMAL

## 2019-06-26 PROCEDURE — 3209999900 FLUORO FOR SURGICAL PROCEDURES

## 2019-06-26 PROCEDURE — 6360000002 HC RX W HCPCS: Performed by: PHYSICAL MEDICINE & REHABILITATION

## 2019-06-26 PROCEDURE — 99152 MOD SED SAME PHYS/QHP 5/>YRS: CPT | Performed by: PHYSICAL MEDICINE & REHABILITATION

## 2019-06-26 PROCEDURE — 3610000056 HC PAIN LEVEL 4 BASE (NON-OR): Performed by: PHYSICAL MEDICINE & REHABILITATION

## 2019-06-26 PROCEDURE — 2709999900 HC NON-CHARGEABLE SUPPLY: Performed by: PHYSICAL MEDICINE & REHABILITATION

## 2019-06-26 PROCEDURE — 2500000003 HC RX 250 WO HCPCS: Performed by: PHYSICAL MEDICINE & REHABILITATION

## 2019-06-26 RX ORDER — BUPIVACAINE HYDROCHLORIDE 5 MG/ML
INJECTION, SOLUTION PERINEURAL
Status: COMPLETED | OUTPATIENT
Start: 2019-06-26 | End: 2019-06-26

## 2019-06-26 RX ORDER — LIDOCAINE HYDROCHLORIDE 10 MG/ML
INJECTION, SOLUTION INFILTRATION; PERINEURAL
Status: COMPLETED | OUTPATIENT
Start: 2019-06-26 | End: 2019-06-26

## 2019-06-26 RX ORDER — METHYLPREDNISOLONE ACETATE 80 MG/ML
INJECTION, SUSPENSION INTRA-ARTICULAR; INTRALESIONAL; INTRAMUSCULAR; SOFT TISSUE
Status: COMPLETED | OUTPATIENT
Start: 2019-06-26 | End: 2019-06-26

## 2019-06-26 RX ORDER — MIDAZOLAM HYDROCHLORIDE 1 MG/ML
INJECTION INTRAMUSCULAR; INTRAVENOUS
Status: COMPLETED | OUTPATIENT
Start: 2019-06-26 | End: 2019-06-26

## 2019-06-26 ASSESSMENT — PAIN DESCRIPTION - PAIN TYPE
TYPE: CHRONIC PAIN
TYPE: CHRONIC PAIN

## 2019-06-26 ASSESSMENT — PAIN - FUNCTIONAL ASSESSMENT: PAIN_FUNCTIONAL_ASSESSMENT: 0-10

## 2019-06-26 ASSESSMENT — PAIN DESCRIPTION - FREQUENCY
FREQUENCY: CONTINUOUS
FREQUENCY: CONTINUOUS

## 2019-06-26 ASSESSMENT — PAIN DESCRIPTION - DESCRIPTORS
DESCRIPTORS: DULL
DESCRIPTORS: DULL
DESCRIPTORS: SHARP

## 2019-06-26 ASSESSMENT — PAIN DESCRIPTION - ORIENTATION
ORIENTATION: LOWER
ORIENTATION: LOWER

## 2019-06-26 ASSESSMENT — PAIN DESCRIPTION - LOCATION
LOCATION: BACK
LOCATION: BACK

## 2019-06-26 ASSESSMENT — PAIN SCALES - GENERAL
PAINLEVEL_OUTOF10: 1
PAINLEVEL_OUTOF10: 1

## 2019-06-26 NOTE — H&P
mouth nightly. Yes Historical Provider, MD   sertraline (ZOLOFT) 25 MG tablet Take 25 mg by mouth nightly. Yes Historical Provider, MD   Omega-3 Fatty Acids (FISH OIL) 1000 MG CPDR   Take 1,000 mg by mouth 2 times daily    Yes Historical Provider, MD   aspirin 81 MG tablet Take 81 mg by mouth daily. Historical Provider, MD     Allergies:  Codeine and Penicillins  Social History:    reports that he quit smoking about 44 years ago. He has never used smokeless tobacco. He reports that he does not drink alcohol or use drugs. Family History:   Family History   Problem Relation Age of Onset    Cancer Other     Diabetes Other     High Blood Pressure Other        Vitals: Blood pressure 122/64, pulse 56, temperature 97.1 °F (36.2 °C), temperature source Temporal, resp. rate 16, height 5' 9\" (1.753 m), weight 186 lb (84.4 kg), SpO2 100 %. PHYSICAL EXAM:including affected areas  HENT: Airway patent and reviewed  Cardiovascular: Normal rate, regular rhythm, normal heart sounds. Pulmonary/Chest: No wheezes. No rhonchi. No rales. Abdominal: Soft. Bowel sounds are normal. No distension. Extremities: Moves all extremities equally  Cervical and Lumbar Spine: Painful range of motion, no midline tenderness       Diagnosis:Lumbar radiculopathy  M51.26  M54.16  M47.816    Plan: Proceed with planned procedure      ASA CLASS:         []   I. Normal, healthy adult           [x]   II.  Mild systemic disease            []   III. Severe systemic disease      Mallampati: Mallampati Class II - (soft palate, fauces & uvula are visible)      Sedation plan:   [x]  Local              []  Minimal                  []  General anesthesia    Patient's condition acceptable for planned procedure/sedation. Post Procedure Plan   Return to same level of care   ______________________     The risks and benefits as well as alternatives to the procedure have been discussed with the patient and or family.   The patient and or next of kin understands and agrees to proceed.     Shu Gabriel M.D.

## 2019-06-26 NOTE — OP NOTE
Patient:  Marta Esteves  YOB: 1940  Medical Record #:  [id number]   Place: [address] [place] [address]  Date:  6/26/2019   Physician:  Trupti Meyers MD, JAMES    Procedure: 1. Transforaminal Lumbar Epidural Steroid Injection -  right L4           2. Transforaminal Lumbar Epidural Steroid Injection -  left L4     Pre-Procedure Diagnosis: Lumbar radiculopathy      Post-Procedure Diagnosis: Same    Sedation: Local with 1% Lidocaine 3 ml and 2 mg of IV Versed    EBL: None    Complications: None    Procedure Summary:        The patient was brought to the procedure suite and placed in the prone position. The skin overlying the lumbar spine was prepped and draped in the usual sterile fashion. Using fluoroscopic guidance, the right L4 foramen was identified. Through anesthetized skin, a 22 gauge 3.5 inch curved tip spinal needle was advanced into the foramen. Isovue M 300 was instilled showing an epidurogram/nerve root outline pattern without evidence of vascular or intrathecal spread. Following which, 50 mg of depomedrol mixed with 1 ml of 0.5% Marcaine was instilled. The needle was removed. Using fluoroscopic guidance, the left L4 foramen was identified. Through anesthetized skin, a 22 gauge 3.5 inch curved tip spinal needle was advanced into the foramen. Isovue M 300 was instilled showing an epidurogram/nerve root outline pattern without evidence of vascular or intrathecal spread. Following which, 50 mg of depomedrol mixed with 1 ml of 0.5% Marcaine was instilled. The needle was removed and band-aids were applied. The patient was transferred to the post-operative area in stable condition.

## 2019-06-26 NOTE — PROGRESS NOTES
IV discontinued, catheter intact, and dressing applied. Procedural dressing dry and intact. Bilateral lower extremities equal in strength. Discharge instructions reviewed with patient or responsible adult, signed and copy given. All home medications have been reviewed. All questions answered and patient or responsible adult verbalized understanding.   PAIN LEVEL AT DISCHARGE __1___

## 2019-07-07 VITALS — WEIGHT: 188.93 LBS | BODY MASS INDEX: 27.05 KG/M2 | RESPIRATION RATE: 14 BRPM | HEIGHT: 70 IN

## 2019-07-11 ENCOUNTER — OFFICE VISIT (OUTPATIENT)
Dept: ORTHOPEDIC SURGERY | Age: 79
End: 2019-07-11
Payer: MEDICARE

## 2019-07-11 VITALS
BODY MASS INDEX: 27.56 KG/M2 | WEIGHT: 186.07 LBS | DIASTOLIC BLOOD PRESSURE: 70 MMHG | SYSTOLIC BLOOD PRESSURE: 113 MMHG | HEIGHT: 69 IN | HEART RATE: 75 BPM

## 2019-07-11 DIAGNOSIS — M54.16 LUMBAR RADICULOPATHY: ICD-10-CM

## 2019-07-11 DIAGNOSIS — M51.26 HNP (HERNIATED NUCLEUS PULPOSUS), LUMBAR: Primary | ICD-10-CM

## 2019-07-11 DIAGNOSIS — M47.816 SPONDYLOSIS OF LUMBAR REGION WITHOUT MYELOPATHY OR RADICULOPATHY: ICD-10-CM

## 2019-07-11 PROCEDURE — G8427 DOCREV CUR MEDS BY ELIG CLIN: HCPCS | Performed by: PHYSICAL MEDICINE & REHABILITATION

## 2019-07-11 PROCEDURE — 4040F PNEUMOC VAC/ADMIN/RCVD: CPT | Performed by: PHYSICAL MEDICINE & REHABILITATION

## 2019-07-11 PROCEDURE — 99213 OFFICE O/P EST LOW 20 MIN: CPT | Performed by: PHYSICAL MEDICINE & REHABILITATION

## 2019-07-11 PROCEDURE — 1036F TOBACCO NON-USER: CPT | Performed by: PHYSICAL MEDICINE & REHABILITATION

## 2019-07-11 PROCEDURE — 1123F ACP DISCUSS/DSCN MKR DOCD: CPT | Performed by: PHYSICAL MEDICINE & REHABILITATION

## 2019-07-11 PROCEDURE — G8419 CALC BMI OUT NRM PARAM NOF/U: HCPCS | Performed by: PHYSICAL MEDICINE & REHABILITATION

## 2019-08-19 ENCOUNTER — OFFICE VISIT (OUTPATIENT)
Dept: ORTHOPEDIC SURGERY | Age: 79
End: 2019-08-19
Payer: MEDICARE

## 2019-08-19 VITALS
BODY MASS INDEX: 25.91 KG/M2 | HEIGHT: 70 IN | SYSTOLIC BLOOD PRESSURE: 122 MMHG | HEART RATE: 68 BPM | WEIGHT: 181 LBS | DIASTOLIC BLOOD PRESSURE: 66 MMHG

## 2019-08-19 DIAGNOSIS — M43.16 SPONDYLOLISTHESIS, LUMBAR REGION: Primary | ICD-10-CM

## 2019-08-19 DIAGNOSIS — M48.062 LUMBAR STENOSIS WITH NEUROGENIC CLAUDICATION: ICD-10-CM

## 2019-08-19 PROCEDURE — 1123F ACP DISCUSS/DSCN MKR DOCD: CPT | Performed by: PHYSICIAN ASSISTANT

## 2019-08-19 PROCEDURE — G8419 CALC BMI OUT NRM PARAM NOF/U: HCPCS | Performed by: PHYSICIAN ASSISTANT

## 2019-08-19 PROCEDURE — 4040F PNEUMOC VAC/ADMIN/RCVD: CPT | Performed by: PHYSICIAN ASSISTANT

## 2019-08-19 PROCEDURE — 99213 OFFICE O/P EST LOW 20 MIN: CPT | Performed by: PHYSICIAN ASSISTANT

## 2019-08-19 PROCEDURE — G8427 DOCREV CUR MEDS BY ELIG CLIN: HCPCS | Performed by: PHYSICIAN ASSISTANT

## 2019-08-19 PROCEDURE — 1036F TOBACCO NON-USER: CPT | Performed by: PHYSICIAN ASSISTANT

## 2019-08-19 RX ORDER — IBUPROFEN 200 MG
200 TABLET ORAL EVERY 6 HOURS PRN
COMMUNITY

## 2019-08-28 ENCOUNTER — HOSPITAL ENCOUNTER (OUTPATIENT)
Dept: MRI IMAGING | Age: 79
Discharge: HOME | End: 2019-08-28
Payer: MEDICARE

## 2019-08-28 DIAGNOSIS — M43.16 SPONDYLOLISTHESIS, LUMBAR REGION: ICD-10-CM

## 2019-08-28 PROCEDURE — 72148 MRI LUMBAR SPINE W/O DYE: CPT

## 2019-09-03 ENCOUNTER — TELEPHONE (OUTPATIENT)
Dept: ORTHOPEDIC SURGERY | Age: 79
End: 2019-09-03

## 2019-09-06 ENCOUNTER — TELEPHONE (OUTPATIENT)
Dept: ORTHOPEDIC SURGERY | Age: 79
End: 2019-09-06

## 2019-09-06 NOTE — TELEPHONE ENCOUNTER
----- Message from Birtha Romberg, PA-C sent at 9/4/2019  1:01 PM EDT -----  Please call patient with MRI results. They show slightly progressed disc bulge L4-5 which is causing central canal stenosis and foraminal narrowing. Welcome to see Dr. Maria De Jesus Goldsmith to discuss treatment options since he has tried epidural injections. Thanks.

## 2019-09-24 ENCOUNTER — OFFICE VISIT (OUTPATIENT)
Dept: ORTHOPEDIC SURGERY | Age: 79
End: 2019-09-24
Payer: MEDICARE

## 2019-09-24 VITALS
BODY MASS INDEX: 25.91 KG/M2 | HEIGHT: 70 IN | DIASTOLIC BLOOD PRESSURE: 68 MMHG | SYSTOLIC BLOOD PRESSURE: 139 MMHG | WEIGHT: 181 LBS | HEART RATE: 72 BPM

## 2019-09-24 DIAGNOSIS — M48.062 SPINAL STENOSIS OF LUMBAR REGION WITH NEUROGENIC CLAUDICATION: Primary | ICD-10-CM

## 2019-09-24 PROCEDURE — 4040F PNEUMOC VAC/ADMIN/RCVD: CPT | Performed by: ORTHOPAEDIC SURGERY

## 2019-09-24 PROCEDURE — G8427 DOCREV CUR MEDS BY ELIG CLIN: HCPCS | Performed by: ORTHOPAEDIC SURGERY

## 2019-09-24 PROCEDURE — 1036F TOBACCO NON-USER: CPT | Performed by: ORTHOPAEDIC SURGERY

## 2019-09-24 PROCEDURE — 1123F ACP DISCUSS/DSCN MKR DOCD: CPT | Performed by: ORTHOPAEDIC SURGERY

## 2019-09-24 PROCEDURE — G8419 CALC BMI OUT NRM PARAM NOF/U: HCPCS | Performed by: ORTHOPAEDIC SURGERY

## 2019-09-24 PROCEDURE — 99213 OFFICE O/P EST LOW 20 MIN: CPT | Performed by: ORTHOPAEDIC SURGERY

## 2019-10-10 RX ORDER — ATORVASTATIN CALCIUM 80 MG/1
80 TABLET, FILM COATED ORAL NIGHTLY
COMMUNITY

## 2019-10-10 RX ORDER — PIOGLITAZONEHYDROCHLORIDE 30 MG/1
45 TABLET ORAL DAILY
COMMUNITY

## 2019-10-10 RX ORDER — ZOLPIDEM TARTRATE 10 MG/1
TABLET ORAL NIGHTLY
COMMUNITY

## 2019-10-11 ENCOUNTER — ANESTHESIA EVENT (OUTPATIENT)
Dept: OPERATING ROOM | Age: 79
DRG: 520 | End: 2019-10-11
Payer: MEDICARE

## 2019-10-14 ENCOUNTER — HOSPITAL ENCOUNTER (INPATIENT)
Age: 79
LOS: 1 days | Discharge: HOME | DRG: 520 | End: 2019-10-14
Attending: ORTHOPAEDIC SURGERY | Admitting: ORTHOPAEDIC SURGERY
Payer: MEDICARE

## 2019-10-14 ENCOUNTER — APPOINTMENT (OUTPATIENT)
Dept: GENERAL RADIOLOGY | Age: 79
DRG: 520 | End: 2019-10-14
Attending: ORTHOPAEDIC SURGERY
Payer: MEDICARE

## 2019-10-14 ENCOUNTER — ANESTHESIA (OUTPATIENT)
Dept: OPERATING ROOM | Age: 79
DRG: 520 | End: 2019-10-14
Payer: MEDICARE

## 2019-10-14 VITALS
DIASTOLIC BLOOD PRESSURE: 60 MMHG | OXYGEN SATURATION: 96 % | RESPIRATION RATE: 1 BRPM | SYSTOLIC BLOOD PRESSURE: 115 MMHG | TEMPERATURE: 96.3 F

## 2019-10-14 VITALS
RESPIRATION RATE: 18 BRPM | DIASTOLIC BLOOD PRESSURE: 71 MMHG | SYSTOLIC BLOOD PRESSURE: 129 MMHG | HEART RATE: 73 BPM | TEMPERATURE: 97 F | HEIGHT: 70 IN | WEIGHT: 184.97 LBS | OXYGEN SATURATION: 96 % | BODY MASS INDEX: 26.48 KG/M2

## 2019-10-14 DIAGNOSIS — M48.062 SPINAL STENOSIS OF LUMBAR REGION WITH NEUROGENIC CLAUDICATION: Primary | ICD-10-CM

## 2019-10-14 PROBLEM — M48.00 SPINAL STENOSIS: Status: ACTIVE | Noted: 2019-10-14

## 2019-10-14 LAB
ABO/RH: NORMAL
ANTIBODY SCREEN: NORMAL
GLUCOSE BLD-MCNC: 158 MG/DL (ref 70–99)
GLUCOSE BLD-MCNC: 172 MG/DL (ref 70–99)
PERFORMED ON: ABNORMAL
PERFORMED ON: ABNORMAL

## 2019-10-14 PROCEDURE — 2500000003 HC RX 250 WO HCPCS: Performed by: NURSE ANESTHETIST, CERTIFIED REGISTERED

## 2019-10-14 PROCEDURE — 86850 RBC ANTIBODY SCREEN: CPT

## 2019-10-14 PROCEDURE — 1200000000 HC SEMI PRIVATE

## 2019-10-14 PROCEDURE — 3700000000 HC ANESTHESIA ATTENDED CARE: Performed by: ORTHOPAEDIC SURGERY

## 2019-10-14 PROCEDURE — 86901 BLOOD TYPING SEROLOGIC RH(D): CPT

## 2019-10-14 PROCEDURE — 3600000004 HC SURGERY LEVEL 4 BASE: Performed by: ORTHOPAEDIC SURGERY

## 2019-10-14 PROCEDURE — 2500000003 HC RX 250 WO HCPCS: Performed by: ORTHOPAEDIC SURGERY

## 2019-10-14 PROCEDURE — 2580000003 HC RX 258: Performed by: ANESTHESIOLOGY

## 2019-10-14 PROCEDURE — 3600000014 HC SURGERY LEVEL 4 ADDTL 15MIN: Performed by: ORTHOPAEDIC SURGERY

## 2019-10-14 PROCEDURE — 6360000002 HC RX W HCPCS: Performed by: NURSE ANESTHETIST, CERTIFIED REGISTERED

## 2019-10-14 PROCEDURE — 7100000000 HC PACU RECOVERY - FIRST 15 MIN: Performed by: ORTHOPAEDIC SURGERY

## 2019-10-14 PROCEDURE — 6360000002 HC RX W HCPCS: Performed by: ORTHOPAEDIC SURGERY

## 2019-10-14 PROCEDURE — 7100000010 HC PHASE II RECOVERY - FIRST 15 MIN: Performed by: ORTHOPAEDIC SURGERY

## 2019-10-14 PROCEDURE — 86900 BLOOD TYPING SEROLOGIC ABO: CPT

## 2019-10-14 PROCEDURE — 2709999900 HC NON-CHARGEABLE SUPPLY: Performed by: ORTHOPAEDIC SURGERY

## 2019-10-14 PROCEDURE — 72020 X-RAY EXAM OF SPINE 1 VIEW: CPT

## 2019-10-14 PROCEDURE — 2580000003 HC RX 258: Performed by: ORTHOPAEDIC SURGERY

## 2019-10-14 PROCEDURE — 86922 COMPATIBILITY TEST ANTIGLOB: CPT

## 2019-10-14 PROCEDURE — 86902 BLOOD TYPE ANTIGEN DONOR EA: CPT

## 2019-10-14 PROCEDURE — 2720000010 HC SURG SUPPLY STERILE: Performed by: ORTHOPAEDIC SURGERY

## 2019-10-14 PROCEDURE — 3700000001 HC ADD 15 MINUTES (ANESTHESIA): Performed by: ORTHOPAEDIC SURGERY

## 2019-10-14 PROCEDURE — 7100000001 HC PACU RECOVERY - ADDTL 15 MIN: Performed by: ORTHOPAEDIC SURGERY

## 2019-10-14 PROCEDURE — 3209999900 FLUORO FOR SURGICAL PROCEDURES

## 2019-10-14 PROCEDURE — 00NY0ZZ RELEASE LUMBAR SPINAL CORD, OPEN APPROACH: ICD-10-PCS | Performed by: ORTHOPAEDIC SURGERY

## 2019-10-14 PROCEDURE — 7100000011 HC PHASE II RECOVERY - ADDTL 15 MIN: Performed by: ORTHOPAEDIC SURGERY

## 2019-10-14 PROCEDURE — 01NB0ZZ RELEASE LUMBAR NERVE, OPEN APPROACH: ICD-10-PCS | Performed by: ORTHOPAEDIC SURGERY

## 2019-10-14 PROCEDURE — 00BT0ZZ EXCISION OF SPINAL MENINGES, OPEN APPROACH: ICD-10-PCS | Performed by: ORTHOPAEDIC SURGERY

## 2019-10-14 RX ORDER — DOCUSATE SODIUM 100 MG/1
100 CAPSULE, LIQUID FILLED ORAL 2 TIMES DAILY PRN
Qty: 30 CAPSULE | Refills: 0 | Status: SHIPPED | OUTPATIENT
Start: 2019-10-14 | End: 2019-10-29

## 2019-10-14 RX ORDER — FENTANYL CITRATE 50 UG/ML
25 INJECTION, SOLUTION INTRAMUSCULAR; INTRAVENOUS EVERY 5 MIN PRN
Status: DISCONTINUED | OUTPATIENT
Start: 2019-10-14 | End: 2019-10-14 | Stop reason: HOSPADM

## 2019-10-14 RX ORDER — EPHEDRINE SULFATE/0.9% NACL/PF 50 MG/5 ML
SYRINGE (ML) INTRAVENOUS PRN
Status: DISCONTINUED | OUTPATIENT
Start: 2019-10-14 | End: 2019-10-14 | Stop reason: SDUPTHER

## 2019-10-14 RX ORDER — PROPOFOL 10 MG/ML
INJECTION, EMULSION INTRAVENOUS PRN
Status: DISCONTINUED | OUTPATIENT
Start: 2019-10-14 | End: 2019-10-14 | Stop reason: SDUPTHER

## 2019-10-14 RX ORDER — ONDANSETRON 2 MG/ML
4 INJECTION INTRAMUSCULAR; INTRAVENOUS
Status: DISCONTINUED | OUTPATIENT
Start: 2019-10-14 | End: 2019-10-14 | Stop reason: HOSPADM

## 2019-10-14 RX ORDER — FENTANYL CITRATE 50 UG/ML
INJECTION, SOLUTION INTRAMUSCULAR; INTRAVENOUS PRN
Status: DISCONTINUED | OUTPATIENT
Start: 2019-10-14 | End: 2019-10-14 | Stop reason: SDUPTHER

## 2019-10-14 RX ORDER — PHENYLEPHRINE HCL IN 0.9% NACL 1 MG/10 ML
SYRINGE (ML) INTRAVENOUS PRN
Status: DISCONTINUED | OUTPATIENT
Start: 2019-10-14 | End: 2019-10-14 | Stop reason: SDUPTHER

## 2019-10-14 RX ORDER — OXYCODONE HYDROCHLORIDE AND ACETAMINOPHEN 5; 325 MG/1; MG/1
1-2 TABLET ORAL
Qty: 50 TABLET | Refills: 0 | Status: SHIPPED | OUTPATIENT
Start: 2019-10-14 | End: 2019-10-21

## 2019-10-14 RX ORDER — LIDOCAINE HYDROCHLORIDE 20 MG/ML
INJECTION, SOLUTION EPIDURAL; INFILTRATION; INTRACAUDAL; PERINEURAL PRN
Status: DISCONTINUED | OUTPATIENT
Start: 2019-10-14 | End: 2019-10-14 | Stop reason: SDUPTHER

## 2019-10-14 RX ORDER — ONDANSETRON 2 MG/ML
INJECTION INTRAMUSCULAR; INTRAVENOUS PRN
Status: DISCONTINUED | OUTPATIENT
Start: 2019-10-14 | End: 2019-10-14 | Stop reason: SDUPTHER

## 2019-10-14 RX ORDER — ACETAMINOPHEN 10 MG/ML
1000 INJECTION, SOLUTION INTRAVENOUS ONCE
Status: COMPLETED | OUTPATIENT
Start: 2019-10-14 | End: 2019-10-14

## 2019-10-14 RX ORDER — FENTANYL CITRATE 50 UG/ML
50 INJECTION, SOLUTION INTRAMUSCULAR; INTRAVENOUS EVERY 5 MIN PRN
Status: DISCONTINUED | OUTPATIENT
Start: 2019-10-14 | End: 2019-10-14 | Stop reason: HOSPADM

## 2019-10-14 RX ORDER — BUPIVACAINE HYDROCHLORIDE AND EPINEPHRINE 2.5; 5 MG/ML; UG/ML
INJECTION, SOLUTION INFILTRATION; PERINEURAL
Status: COMPLETED | OUTPATIENT
Start: 2019-10-14 | End: 2019-10-14

## 2019-10-14 RX ORDER — SODIUM CHLORIDE 0.9 % (FLUSH) 0.9 %
10 SYRINGE (ML) INJECTION EVERY 12 HOURS SCHEDULED
Status: DISCONTINUED | OUTPATIENT
Start: 2019-10-14 | End: 2019-10-14 | Stop reason: HOSPADM

## 2019-10-14 RX ORDER — SODIUM CHLORIDE 9 MG/ML
INJECTION, SOLUTION INTRAVENOUS CONTINUOUS
Status: DISCONTINUED | OUTPATIENT
Start: 2019-10-14 | End: 2019-10-14 | Stop reason: HOSPADM

## 2019-10-14 RX ORDER — DEXAMETHASONE SODIUM PHOSPHATE 4 MG/ML
INJECTION, SOLUTION INTRA-ARTICULAR; INTRALESIONAL; INTRAMUSCULAR; INTRAVENOUS; SOFT TISSUE PRN
Status: DISCONTINUED | OUTPATIENT
Start: 2019-10-14 | End: 2019-10-14 | Stop reason: SDUPTHER

## 2019-10-14 RX ORDER — ROCURONIUM BROMIDE 10 MG/ML
INJECTION, SOLUTION INTRAVENOUS PRN
Status: DISCONTINUED | OUTPATIENT
Start: 2019-10-14 | End: 2019-10-14 | Stop reason: SDUPTHER

## 2019-10-14 RX ORDER — KETOROLAC TROMETHAMINE 30 MG/ML
INJECTION, SOLUTION INTRAMUSCULAR; INTRAVENOUS PRN
Status: DISCONTINUED | OUTPATIENT
Start: 2019-10-14 | End: 2019-10-14 | Stop reason: SDUPTHER

## 2019-10-14 RX ORDER — SODIUM CHLORIDE 0.9 % (FLUSH) 0.9 %
10 SYRINGE (ML) INJECTION PRN
Status: DISCONTINUED | OUTPATIENT
Start: 2019-10-14 | End: 2019-10-14 | Stop reason: HOSPADM

## 2019-10-14 RX ADMIN — LIDOCAINE HYDROCHLORIDE 50 MG: 20 INJECTION, SOLUTION EPIDURAL; INFILTRATION; INTRACAUDAL; PERINEURAL at 07:34

## 2019-10-14 RX ADMIN — SODIUM CHLORIDE: 9 INJECTION, SOLUTION INTRAVENOUS at 07:28

## 2019-10-14 RX ADMIN — ACETAMINOPHEN 1000 MG: 10 INJECTION, SOLUTION INTRAVENOUS at 06:50

## 2019-10-14 RX ADMIN — ROCURONIUM BROMIDE 30 MG: 10 INJECTION INTRAVENOUS at 07:38

## 2019-10-14 RX ADMIN — PROPOFOL 150 MG: 10 INJECTION, EMULSION INTRAVENOUS at 07:34

## 2019-10-14 RX ADMIN — SUGAMMADEX 200 MG: 100 INJECTION, SOLUTION INTRAVENOUS at 08:38

## 2019-10-14 RX ADMIN — Medication 200 MCG: at 07:59

## 2019-10-14 RX ADMIN — SODIUM CHLORIDE: 9 INJECTION, SOLUTION INTRAVENOUS at 06:54

## 2019-10-14 RX ADMIN — Medication 1500 MG: at 07:00

## 2019-10-14 RX ADMIN — ONDANSETRON 4 MG: 2 INJECTION INTRAMUSCULAR; INTRAVENOUS at 07:45

## 2019-10-14 RX ADMIN — FENTANYL CITRATE 100 MCG: 50 INJECTION, SOLUTION INTRAMUSCULAR; INTRAVENOUS at 07:34

## 2019-10-14 RX ADMIN — DEXAMETHASONE SODIUM PHOSPHATE 8 MG: 4 INJECTION, SOLUTION INTRAMUSCULAR; INTRAVENOUS at 07:45

## 2019-10-14 RX ADMIN — Medication 200 MCG: at 07:54

## 2019-10-14 RX ADMIN — KETOROLAC TROMETHAMINE 30 MG: 30 INJECTION, SOLUTION INTRAMUSCULAR at 08:38

## 2019-10-14 RX ADMIN — Medication 10 MG: at 08:06

## 2019-10-14 RX ADMIN — Medication 1500 MG: at 07:35

## 2019-10-14 ASSESSMENT — PULMONARY FUNCTION TESTS
PIF_VALUE: 18
PIF_VALUE: 15
PIF_VALUE: 18
PIF_VALUE: 18
PIF_VALUE: 15
PIF_VALUE: 18
PIF_VALUE: 16
PIF_VALUE: 15
PIF_VALUE: 18
PIF_VALUE: 0
PIF_VALUE: 15
PIF_VALUE: 18
PIF_VALUE: 18
PIF_VALUE: 15
PIF_VALUE: 18
PIF_VALUE: 23
PIF_VALUE: 15
PIF_VALUE: 18
PIF_VALUE: 15
PIF_VALUE: 0
PIF_VALUE: 18
PIF_VALUE: 18
PIF_VALUE: 1
PIF_VALUE: 1
PIF_VALUE: 18
PIF_VALUE: 15
PIF_VALUE: 3
PIF_VALUE: 18
PIF_VALUE: 15
PIF_VALUE: 18
PIF_VALUE: 18
PIF_VALUE: 1
PIF_VALUE: 0
PIF_VALUE: 15
PIF_VALUE: 3
PIF_VALUE: 18
PIF_VALUE: 18
PIF_VALUE: 3
PIF_VALUE: 18
PIF_VALUE: 18
PIF_VALUE: 16
PIF_VALUE: 1
PIF_VALUE: 15
PIF_VALUE: 41
PIF_VALUE: 18
PIF_VALUE: 1
PIF_VALUE: 18
PIF_VALUE: 18
PIF_VALUE: 16
PIF_VALUE: 19
PIF_VALUE: 18
PIF_VALUE: 18
PIF_VALUE: 26
PIF_VALUE: 18
PIF_VALUE: 3
PIF_VALUE: 18
PIF_VALUE: 17
PIF_VALUE: 2
PIF_VALUE: 18
PIF_VALUE: 15
PIF_VALUE: 18
PIF_VALUE: 15
PIF_VALUE: 18
PIF_VALUE: 16
PIF_VALUE: 15
PIF_VALUE: 17
PIF_VALUE: 16
PIF_VALUE: 2
PIF_VALUE: 18
PIF_VALUE: 15
PIF_VALUE: 15
PIF_VALUE: 18
PIF_VALUE: 15
PIF_VALUE: 15
PIF_VALUE: 18
PIF_VALUE: 16
PIF_VALUE: 18
PIF_VALUE: 1
PIF_VALUE: 1
PIF_VALUE: 18
PIF_VALUE: 3
PIF_VALUE: 16

## 2019-10-14 ASSESSMENT — PAIN DESCRIPTION - FREQUENCY
FREQUENCY: CONTINUOUS

## 2019-10-14 ASSESSMENT — PAIN DESCRIPTION - ORIENTATION
ORIENTATION: LOWER

## 2019-10-14 ASSESSMENT — PAIN DESCRIPTION - LOCATION
LOCATION: BACK

## 2019-10-14 ASSESSMENT — PAIN DESCRIPTION - DESCRIPTORS
DESCRIPTORS: PRESSURE
DESCRIPTORS: PRESSURE
DESCRIPTORS: ACHING;DISCOMFORT;CONSTANT;DULL
DESCRIPTORS: DISCOMFORT
DESCRIPTORS: DISCOMFORT;PRESSURE

## 2019-10-14 ASSESSMENT — PAIN SCALES - GENERAL
PAINLEVEL_OUTOF10: 2

## 2019-10-14 ASSESSMENT — PAIN DESCRIPTION - PROGRESSION
CLINICAL_PROGRESSION: NOT CHANGED

## 2019-10-14 ASSESSMENT — PAIN - FUNCTIONAL ASSESSMENT
PAIN_FUNCTIONAL_ASSESSMENT: ACTIVITIES ARE NOT PREVENTED
PAIN_FUNCTIONAL_ASSESSMENT: PREVENTS OR INTERFERES SOME ACTIVE ACTIVITIES AND ADLS
PAIN_FUNCTIONAL_ASSESSMENT: 0-10
PAIN_FUNCTIONAL_ASSESSMENT: PREVENTS OR INTERFERES SOME ACTIVE ACTIVITIES AND ADLS
PAIN_FUNCTIONAL_ASSESSMENT: ACTIVITIES ARE NOT PREVENTED

## 2019-10-14 ASSESSMENT — PAIN DESCRIPTION - PAIN TYPE
TYPE: SURGICAL PAIN

## 2019-10-14 ASSESSMENT — PAIN DESCRIPTION - ONSET
ONSET: ON-GOING

## 2019-10-15 LAB
BLOOD BANK DISPENSE STATUS: NORMAL
BLOOD BANK DISPENSE STATUS: NORMAL
BLOOD BANK PRODUCT CODE: NORMAL
BLOOD BANK PRODUCT CODE: NORMAL
BPU ID: NORMAL
BPU ID: NORMAL
DESCRIPTION BLOOD BANK: NORMAL
DESCRIPTION BLOOD BANK: NORMAL

## 2019-10-29 ENCOUNTER — OFFICE VISIT (OUTPATIENT)
Dept: ORTHOPEDIC SURGERY | Age: 79
End: 2019-10-29

## 2019-10-29 VITALS — BODY MASS INDEX: 26.05 KG/M2 | HEIGHT: 70 IN | WEIGHT: 182 LBS

## 2019-10-29 DIAGNOSIS — M48.062 SPINAL STENOSIS OF LUMBAR REGION WITH NEUROGENIC CLAUDICATION: Primary | ICD-10-CM

## 2019-10-29 PROCEDURE — 99024 POSTOP FOLLOW-UP VISIT: CPT | Performed by: ORTHOPAEDIC SURGERY

## 2020-02-04 NOTE — OP NOTE
Patient:  Sabina Geller  YOB: 1940  Medical Record #:  [id number]   Place: [address] [place] [address]  Date:  6/12/2019   Physician:  Esha Templeton MD, JAMES    Procedure: 1. Transforaminal Lumbar Epidural Steroid Injection -  right L4           2. Transforaminal Lumbar Epidural Steroid Injection -  left L4     Pre-Procedure Diagnosis: Lumbar radiculopathy      Post-Procedure Diagnosis: Same    Sedation: Local with 1% Lidocaine 3 ml and 2 mg of IV Versed    EBL: None    Complications: None    Procedure Summary:        The patient was brought to the procedure suite and placed in the prone position. The skin overlying the lumbar spine was prepped and draped in the usual sterile fashion. Using fluoroscopic guidance, the right L4 foramen was identified. Through anesthetized skin, a 22 gauge 3.5 inch curved tip spinal needle was advanced into the foramen. Isovue M 300 was instilled showing an epidurogram/nerve root outline pattern without evidence of vascular or intrathecal spread. Following which, 50 mg of depomedrol mixed with 1 ml of 0.5% Marcaine was instilled. The needle was removed. Using fluoroscopic guidance, the left L4 foramen was identified. Through anesthetized skin, a 22 gauge 3.5 inch curved tip spinal needle was advanced into the foramen. Isovue M 300 was instilled showing an epidurogram/nerve root outline pattern without evidence of vascular or intrathecal spread. Following which, 50 mg of depomedrol mixed with 1 ml of 0.5% Marcaine was instilled. The needle was removed and band-aids were applied. The patient was transferred to the post-operative area in stable condition. See note I sent him.

## 2020-08-18 ENCOUNTER — OFFICE VISIT (OUTPATIENT)
Dept: ORTHOPEDIC SURGERY | Age: 80
End: 2020-08-18
Payer: MEDICARE

## 2020-08-18 VITALS — HEIGHT: 70 IN | WEIGHT: 182 LBS | BODY MASS INDEX: 26.05 KG/M2 | TEMPERATURE: 98.2 F

## 2020-08-18 PROCEDURE — 99213 OFFICE O/P EST LOW 20 MIN: CPT | Performed by: ORTHOPAEDIC SURGERY

## 2020-08-18 PROCEDURE — 1123F ACP DISCUSS/DSCN MKR DOCD: CPT | Performed by: ORTHOPAEDIC SURGERY

## 2020-08-18 PROCEDURE — G8417 CALC BMI ABV UP PARAM F/U: HCPCS | Performed by: ORTHOPAEDIC SURGERY

## 2020-08-18 PROCEDURE — 4040F PNEUMOC VAC/ADMIN/RCVD: CPT | Performed by: ORTHOPAEDIC SURGERY

## 2020-08-18 PROCEDURE — 1036F TOBACCO NON-USER: CPT | Performed by: ORTHOPAEDIC SURGERY

## 2020-08-18 PROCEDURE — G8427 DOCREV CUR MEDS BY ELIG CLIN: HCPCS | Performed by: ORTHOPAEDIC SURGERY

## 2020-08-18 NOTE — PROGRESS NOTES
CHIEF COMPLAINT:   1-Right heel pain/plantar fasciitis. 2-Right lateral ankle pain/ATFL ankle sprain    DATE OF INJURY: July 2020    HISTORY:  Mr. Pedro Price 78 y.o.  male presents today for the first visit for evaluation of right heel and lateral ankle pain which started after his legs gave out causing him to fall rolling his right foot and ankle. He initially went to urgent care who evaluated him and sent him for x-rays and put him in an ankle brace instructed to follow-up with orthopedics.  He is complaining of achy  pain. Pain is increase with standing and wallking. Rates pain a 2/10 VAS. Pain is sharp early in the morning with first few steps, dull achy pain by the end of the day. Denies ankle instability. His primary care physician recently sent him to physical therapy which he states is helping. No radiation and no numbness and tingling sensation. No other complaint.       Past Medical History:   Diagnosis Date    Anxiety     Arthritis     Diabetes mellitus ([facility] [address]     High blood pressure     [employer] [employer] [place] (hard of hearing)     Hyperlipidemia     Irregular heart beat     Wears glasses     Wears hearing aid in both ears     Wears partial dentures        Past Surgical History:   Procedure Laterality Date    COLONOSCOPY      CYST REMOVAL Left 5/21/15    FOOT SURGERY Left     cyst removed from foot    JOINT REPLACEMENT Right     shoulder    LUMBAR SPINE SURGERY Bilateral 6/12/2019    BILATERAL L4 TRANSFORAMINAL EPIDURAL STEROID INJECTION WITH FLUOROSCOPY performed by Eldon Freeman MD at [address] [place] [address] Bilateral 6/26/2019    BILATERAL L4 TRANSFORAMINAL EPIDURAL STEROID INJECTION WITH FLUOROSCOPY performed by Eldon Freeman MD at [address] [place] [address] N/A 10/14/2019    MICROLUMBAR LAMINECTOMY L4-5, 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, performed by Ivan Espinal MD at [address] [place] [address]      tumor removed from neck    SHOULDER ARTHROPLASTY Right 2/17/15    SHOULDER SURGERY Bilateral     WRIST SURGERY         Social History     Socioeconomic History    Marital status:      Spouse name: Not on file    Number of children: 3    Years of education: Not on file    Highest education level: Not on file   Occupational History    Occupation: Retired   Social Needs    Financial resource strain: Not on file    Food insecurity     Worry: Not on file     Inability: Not on file   [employer] needs     Medical: Not on file     Non-medical: Not on file   Tobacco Use    Smoking status: Former Smoker     Last attempt to quit: 1975     Years since quittin.2    Smokeless tobacco: Never Used   Substance and Sexual Activity    Alcohol use: No    Drug use: No    Sexual activity: Not Currently   Lifestyle    Physical activity     Days per week: Not on file     Minutes per session: Not on file    Stress: Not on file   Relationships    Social connections     Talks on phone: Not on file     Gets together: Not on file     Attends [employer] service: Not on file     Active member of club or organization: Not on file     Attends meetings of clubs or organizations: Not on file     Relationship status: Not on file    Intimate partner violence     Fear of current or ex partner: Not on file     Emotionally abused: Not on file     Physically abused: Not on file     Forced sexual activity: Not on file   Other Topics Concern    Not on file   Social History Narrative    Not on file       Family History   Problem Relation Age of Onset    No Known Problems Mother     No Known Problems Father     Heart Disease Brother     Heart Disease Brother        Current Outpatient Medications on File Prior to Visit   Medication Sig Dispense Refill    pioglitazone (ACTOS) 30 MG tablet Take 30 mg by mouth daily      atorvastatin (LIPITOR) 80 MG tablet Take 80 mg by mouth nightly      Multiple Vitamins-Minerals (MULTIVITAMIN ADULT PO) Take 1 tablet by mouth daily      zolpidem (AMBIEN) 10 MG tablet Take by mouth nightly.  ibuprofen (ADVIL;MOTRIN) 200 MG tablet Take 200 mg by mouth every 6 hours as needed for Pain      metFORMIN (GLUCOPHAGE) 1000 MG tablet Take 1,000 mg by mouth 2 times daily (with meals)      valsartan-hydrochlorothiazide (DIOVAN-HCT) 160-12.5 MG per tablet Take 1 tablet by mouth daily.  glipiZIDE (GLUCOTROL) 5 MG tablet Take 5 mg by mouth.  sertraline (ZOLOFT) 25 MG tablet Take 25 mg by mouth nightly.  Omega-3 Fatty Acids (FISH OIL) 1000 MG CPDR   Take 1,000 mg by mouth 2 times daily       aspirin 81 MG tablet Take 81 mg by mouth daily. No current facility-administered medications on file prior to visit. Pertinent items are noted in HPI  Review of systems reviewed from Patient History Form dated on 8/18/2020 and available in the patient's chart under the Media tab. PHYSICAL EXAMINATION:  Mr. Terence Waters is a very pleasant 78 y.o.  male who presents today in no acute distress, awake, alert, and oriented. He is well dressed, nourished and  groomed. Patient with normal affect. Height is  5' 10\" (1.778 m), weight is 182 lb (82.6 kg), Body mass index is 26.11 kg/m². Resting respiratory rate is 16. Examination of the gait, showed that the patient walks heel-toe with a non-antalgic gait and no limp.  Examination of both ankles showing a good range of motion.  He has dorsiflexion to about 10 degrees bilaterally, which increased with knee flexion. He has intact sensation and good pedal pulses.  He has good strength in all four planes, including eversion, and has moderate tenderness on deep palpation over the medial calcaneal tubercle, compared to the other side.  He has tenderness to palpation over the right ATF ligament compared to the other side. There is mild swelling in the right ankle. Skin is intact.   The ankles are stable to drawer test bilaterally, equally.      IMAGING:Xray's were reviewed.  3 views of the right foot and ankle taken in office today, and showed no acute fracture. No other abnormality. IMPRESSION:   1-Right plantar fasciitis. 2-Right ATFL ankle sprain    PLAN: I discussed with the patient the treatment options. We recommended stretching exercises of the calf as well as stretching of the plantar fascia which was taught to the patient today. He will take NSAIDS Naprosyn. Use silicone heel pad. He can discontinue the ankle brace at this point and was instructed to work on peroneal strengthening exercises which were demonstrated to him today in office. Continue physical therapy. F/u in 6 weeks. He understands that this may take up to 6 months for the pain to resolve.      Danyell Morales MD

## 2020-08-19 ENCOUNTER — TELEPHONE (OUTPATIENT)
Dept: ORTHOPEDIC SURGERY | Age: 80
End: 2020-08-19

## 2020-08-19 PROBLEM — M72.2 PLANTAR FASCIITIS OF RIGHT FOOT: Status: ACTIVE | Noted: 2020-08-19

## 2020-08-19 PROBLEM — S93.491A SPRAIN OF ANTERIOR TALOFIBULAR LIGAMENT OF RIGHT ANKLE: Status: ACTIVE | Noted: 2020-08-19

## 2020-08-19 RX ORDER — NAPROXEN 500 MG/1
500 TABLET ORAL 2 TIMES DAILY WITH MEALS
Qty: 28 TABLET | Refills: 0 | Status: SHIPPED | OUTPATIENT
Start: 2020-08-19 | End: 2022-03-30 | Stop reason: ALTCHOICE

## 2020-08-19 NOTE — TELEPHONE ENCOUNTER
Spoke with daughter, on [facility]. She states pt forgot to mention his kne epain jenn his visit yesterday. Appt made for 8/28 to see Dr. Kaitlyn Raya for bilateral knee.

## 2020-08-28 ENCOUNTER — OFFICE VISIT (OUTPATIENT)
Dept: ORTHOPEDIC SURGERY | Age: 80
End: 2020-08-28
Payer: MEDICARE

## 2020-08-28 VITALS — WEIGHT: 182 LBS | HEIGHT: 70 IN | TEMPERATURE: 97.2 F | BODY MASS INDEX: 26.05 KG/M2

## 2020-08-28 PROBLEM — M62.81 MUSCLE WEAKNESS: Status: ACTIVE | Noted: 2020-08-28

## 2020-08-28 PROCEDURE — 1123F ACP DISCUSS/DSCN MKR DOCD: CPT | Performed by: ORTHOPAEDIC SURGERY

## 2020-08-28 PROCEDURE — 4040F PNEUMOC VAC/ADMIN/RCVD: CPT | Performed by: ORTHOPAEDIC SURGERY

## 2020-08-28 PROCEDURE — G8417 CALC BMI ABV UP PARAM F/U: HCPCS | Performed by: ORTHOPAEDIC SURGERY

## 2020-08-28 PROCEDURE — 1036F TOBACCO NON-USER: CPT | Performed by: ORTHOPAEDIC SURGERY

## 2020-08-28 PROCEDURE — 99214 OFFICE O/P EST MOD 30 MIN: CPT | Performed by: ORTHOPAEDIC SURGERY

## 2020-08-28 PROCEDURE — G8427 DOCREV CUR MEDS BY ELIG CLIN: HCPCS | Performed by: ORTHOPAEDIC SURGERY

## 2020-08-28 RX ORDER — ROSUVASTATIN CALCIUM 20 MG/1
20 TABLET, COATED ORAL EVERY OTHER DAY
COMMUNITY
Start: 2020-08-26

## 2020-08-28 NOTE — PROGRESS NOTES
CHIEF COMPLAINT: Bilateral R>L knee weakness and pain/ extension lag bilateral knees. HISTORY:  Mr. Pretty Horta 78 y.o.  male well known to me presents today for evaluation of bilateral knee extension lag R>L which started over 10 years ago.  He is complaining of dull pain. Pain is increase with standing and walking and decrease with rest. He feels his legs weak and knees wing sometimes, dull achy pain by the end of the day. Alleviating factors: rest. No radiation and no numbness and tingling sensation. No other complaint. No h/o trauma or gout. The patient is known to me for right lateral ankle ATFL ankle sprain.     DATE OF INJURY: July 2020     Past Medical History:   Diagnosis Date    Anxiety     Arthritis     Diabetes mellitus ([address]     High blood pressure     [place] (hard of hearing)     Hyperlipidemia     Irregular heart beat     Wears glasses     Wears hearing aid in both ears     Wears partial dentures        Past Surgical History:   Procedure Laterality Date    COLONOSCOPY      CYST REMOVAL Left 5/21/15    FOOT SURGERY Left     cyst removed from foot    JOINT REPLACEMENT Right     shoulder    LUMBAR SPINE SURGERY Bilateral 6/12/2019    BILATERAL L4 TRANSFORAMINAL EPIDURAL STEROID INJECTION WITH FLUOROSCOPY performed by Stef Jj MD at [address] [place] [address] Bilateral 6/26/2019    BILATERAL L4 TRANSFORAMINAL EPIDURAL STEROID INJECTION WITH FLUOROSCOPY performed by Stef Jj MD at [address] [place] [address] N/A 10/14/2019    MICROLUMBAR LAMINECTOMY L4-5, 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, performed by Stu Davenport MD at [place] [address]      tumor removed from neck    SHOULDER ARTHROPLASTY Right 2/17/15    SHOULDER SURGERY Bilateral     WRIST SURGERY         Social History     Socioeconomic History    Marital status:      Spouse name: Not on file    Number of children: 3    Years of education: Not on file    Highest education level: Not on file   Occupational History    Occupation: Retired   Social Needs    Financial resource strain: Not on file    Food insecurity     Worry: Not on file     Inability: Not on file   [employer] needs     Medical: Not on file     Non-medical: Not on file   Tobacco Use    Smoking status: Former Smoker     Last attempt to quit: 1975     Years since quittin.3    Smokeless tobacco: Never Used   Substance and Sexual Activity    Alcohol use: No    Drug use: No    Sexual activity: Not Currently   Lifestyle    Physical activity     Days per week: Not on file     Minutes per session: Not on file    Stress: Not on file   Relationships    Social connections     Talks on phone: Not on file     Gets together: Not on file     Attends Mu-ism service: Not on file     Active member of club or organization: Not on file     Attends meetings of clubs or organizations: Not on file     Relationship status: Not on file    Intimate partner violence     Fear of current or ex partner: Not on file     Emotionally abused: Not on file     Physically abused: Not on file     Forced sexual activity: Not on file   Other Topics Concern    Not on file   Social History Narrative    Not on file       Family History   Problem Relation Age of Onset    No Known Problems Mother     No Known Problems Father     Heart Disease Brother     Heart Disease Brother        Current Outpatient Medications on File Prior to Visit   Medication Sig Dispense Refill    rosuvastatin (CRESTOR) 20 MG tablet Take 20 mg by mouth every other day      empagliflozin (JARDIANCE) 10 MG tablet Take 10 mg by mouth daily      naproxen (NAPROSYN) 500 MG tablet Take 1 tablet by mouth 2 times daily (with meals) for 14 days 28 tablet 0    pioglitazone (ACTOS) 30 MG tablet Take 30 mg by mouth daily      atorvastatin (LIPITOR) 80 MG tablet Take 80 mg by mouth nightly      Multiple Vitamins-Minerals (MULTIVITAMIN ADULT PO) Take 1 tablet by mouth daily      zolpidem (AMBIEN) 10 MG tablet Take by mouth nightly.  ibuprofen (ADVIL;MOTRIN) 200 MG tablet Take 200 mg by mouth every 6 hours as needed for Pain      metFORMIN (GLUCOPHAGE) 1000 MG tablet Take 1,000 mg by mouth 2 times daily (with meals)      glipiZIDE (GLUCOTROL) 5 MG tablet Take 5 mg by mouth.  sertraline (ZOLOFT) 25 MG tablet Take 25 mg by mouth nightly.  Omega-3 Fatty Acids (FISH OIL) 1000 MG CPDR   Take 1,000 mg by mouth 2 times daily       aspirin 81 MG tablet Take 81 mg by mouth daily.  valsartan-hydrochlorothiazide (DIOVAN-HCT) 160-12.5 MG per tablet Take 1 tablet by mouth daily. No current facility-administered medications on file prior to visit. Pertinent items are noted in HPI  Review of systems reviewed from Patient History Form dated on 8/12/2020 and available in the patient's chart under the Media tab. No change. PHYSICAL EXAMINATION:  Mr. Roxann Ferreira is a very pleasant 78 y.o.  male who presents today in no acute distress, awake, alert, and oriented. He is well dressed, nourished and  groomed. Patient with normal affect. Height is  5' 10\" (1.778 m), weight is 182 lb (82.6 kg), Body mass index is 26.11 kg/m². Resting respiratory rate is 16. Examination of the gait, showed that the patient walks heel-toe with a leg dragging type gait and a limp.  Examination of both knees showing decrease active extension ROM, with about 15 degree lag bilateral, but full passive extension, mild crepitus, tenderness on medial joint line, stable to varus and valgus stress. He has intact sensation and good pedal pulses. He has good strength in 2 planes, and has mild tenderness on deep palpation over the medial joint line. Knee reflex 1+ bilaterally. Quad and patellar tendon are intact bilateral.          IMAGING:  Xray 3 views of the bilaterally knee was obtained today in the office and reviewed.   These demonstrate mild

## 2020-09-08 ENCOUNTER — HOSPITAL ENCOUNTER (OUTPATIENT)
Dept: NEUROLOGY | Age: 80
Discharge: HOME | End: 2020-09-08
Payer: MEDICARE

## 2020-09-08 PROCEDURE — 95909 NRV CNDJ TST 5-6 STUDIES: CPT

## 2020-09-08 PROCEDURE — 95886 MUSC TEST DONE W/N TEST COMP: CPT

## 2020-09-08 NOTE — PROCEDURES
14.2 - 0.80 -  Bel Fib Head-Ankle 37 37  > 38    Right Fibular (EDB) Motor   Ankle 5.2  < 6.1 1.69  > 2.0         Bel Fib Head 13.9 - 1.32 -  Bel Fib Head-Ankle 34 39  > 38    Left Tibial (AHB) Motor   Ankle 4.9  < 6.1 3.5  > 4.4         Knee 15.0 - 1.31 -  Knee-Ankle 36 36  > 39    Right Tibial (AHB) Motor   Ankle 5.3  < 6.1 4.4  > 4.4         Knee 14.8 - 3.7 -  Knee-Ankle 37 39  > 39      Sensory Nerve Results      Latency (Peak) Amplitude (P-P) Segment Distance CV Comment   Site (ms) Norm (µV) Norm  (cm) (m/s) Norm    Left Sural Sensory   Calf-Lat Mall NR  < 4.0 NR  > 5 Calf-Lat Mall 14 NR  > 35    Right Sural Sensory   Calf-Lat Mall 4.0  < 4.0 12  > 5 Calf-Lat Mall 14 35  > 35        Electromyography     Side Muscle Nerve Root Ins Act Fibs Psw Amp Dur Poly Recrt Int Pat Comment   Right Gluteus Med Sup Gluteal L5-S1 Nml Nml Nml Nml Nml 0 Nml Nml    Right Vastus Med Femoral L2-L4 Nml Nml Nml Incr Nml 0 Reduced 50%    Right Add Longus Obturator L2-L4 Nml Nml Nml Nml Nml 0 Nml Nml    Right Tib Anterior Deep Fibular,  Fibula. .. L4-L5 Nml Nml Nml Nml Nml 0 Nml Nml    Right Fib longus  L5-S1 Nml Nml Nml Nml Nml 0 Nml Nml    Right Gastroc MH Tibial S1-S2 Nml Nml Nml Nml Nml 0 Nml Nml    Right Ext Nam Long Deep Fibular,  Fibula. .. L5-S1 Nml Nml Nml Nml Nml 0 Nml Nml    Right EDB Deep Fibular,  Fibula. .. L5-S1 Nml Nml Nml Nml Nml 0 Nml Nml    Right AHB Medial Plantar,  Tibi. .. S1-S2 Nml Nml Nml Nml Nml 0 Nml Nml    Right Lumbo Paraspinal (Upper) Rami L1-L2 Nml Nml Nml         Right Lumbo Paraspinal (Mid) Rami L3-L4 Dec l Nml Nml         Right Lumbo Paraspinal (Lower) Rami L5-S1 Nml Nml Nml         Left Gluteus Med Sup Gluteal L5-S1 Nml Nml Nml Nml Nml 0 Nml Nml    Left Vastus Med Femoral L2-L4 Nml Nml Nml Nml Nml 0 Nml Nml    Left Add Longus Obturator L2-L4 Nml Nml Nml Nml Nml 0 Nml Nml    Left Tib Anterior Deep Fibular,  Fibula. ..  L4-L5 Nml Nml Nml Nml Nml 0 Nml Nml    Left Fib longus  L5-S1 Nml Nml Nml Nml Nml 0 Nml Nml    Left Gastroc MH Tibial S1-S2 Nml Nml Nml Nml Nml 0 Nml Nml    Left Ext Nam Long Deep Fibular,  Fibula. .. L5-S1 Nml Nml Nml Nml Nml 0 Nml Nml    Left EDB Deep Fibular,  Fibula. .. L5-S1 Nml Nml Nml Nml Nml 0 Nml Nml    Left AHB Medial Plantar,  Tibi. ..  S1-S2 Nml Nml Nml Nml Nml 0 Nml Nml    Left Lumbo Paraspinal (Upper) Rami L1-L2 Nml Nml Nml         Left Lumbo Paraspinal (Mid) Rami L3-L4 Nml Nml Nml         Left Lumbo Paraspinal (Lower) Rami L5-S1 Nml Nml Nml               Electronically signed by Marsha Love DO on 9/8/2020 at 11:27 AM

## 2020-09-11 ENCOUNTER — TELEPHONE (OUTPATIENT)
Dept: ORTHOPEDIC SURGERY | Age: 80
End: 2020-09-11

## 2020-09-11 ENCOUNTER — TELEPHONE (OUTPATIENT)
Dept: NEUROLOGY | Age: 80
End: 2020-09-11

## 2020-09-11 ENCOUNTER — OFFICE VISIT (OUTPATIENT)
Dept: ORTHOPEDIC SURGERY | Age: 80
End: 2020-09-11
Payer: MEDICARE

## 2020-09-11 VITALS — TEMPERATURE: 97 F | BODY MASS INDEX: 26.05 KG/M2 | HEIGHT: 70 IN | RESPIRATION RATE: 16 BRPM | WEIGHT: 182 LBS

## 2020-09-11 PROCEDURE — 99214 OFFICE O/P EST MOD 30 MIN: CPT | Performed by: ORTHOPAEDIC SURGERY

## 2020-09-11 PROCEDURE — 1123F ACP DISCUSS/DSCN MKR DOCD: CPT | Performed by: ORTHOPAEDIC SURGERY

## 2020-09-11 PROCEDURE — 1036F TOBACCO NON-USER: CPT | Performed by: ORTHOPAEDIC SURGERY

## 2020-09-11 PROCEDURE — G8427 DOCREV CUR MEDS BY ELIG CLIN: HCPCS | Performed by: ORTHOPAEDIC SURGERY

## 2020-09-11 PROCEDURE — G8417 CALC BMI ABV UP PARAM F/U: HCPCS | Performed by: ORTHOPAEDIC SURGERY

## 2020-09-11 PROCEDURE — 4040F PNEUMOC VAC/ADMIN/RCVD: CPT | Performed by: ORTHOPAEDIC SURGERY

## 2020-09-11 NOTE — TELEPHONE ENCOUNTER
They need a referral sent to Dr Watson Kinds fax# [phone] and needs an order for the EMG as well.

## 2020-09-11 NOTE — TELEPHONE ENCOUNTER
Vivian Moreau called back stating that he needs a EMG for his hands this time. Last time it was for his legs.  # [phone]

## 2020-09-11 NOTE — TELEPHONE ENCOUNTER
Daughter called to try to schedule her father a new patient apt with Dr. Jeanne Roberto.  There should be a faxed referral for him

## 2020-09-11 NOTE — TELEPHONE ENCOUNTER
Called and left message for Nohemy Fortune (daughter [phone] per Florecita Sandyson request)     **unsure of why patient is needing referral to Dr Ludmila Weir office and an order for EMG. An EMG was already completed and results gone over today in office.  Then referred to Dr Dillon Chavez (neurologist)**

## 2020-09-12 PROBLEM — G62.9 NEUROPATHY: Status: ACTIVE | Noted: 2020-09-12

## 2020-09-12 PROBLEM — R20.2 NUMBNESS AND TINGLING IN BOTH HANDS: Status: ACTIVE | Noted: 2020-09-12

## 2020-09-12 PROBLEM — R20.0 NUMBNESS AND TINGLING IN BOTH HANDS: Status: ACTIVE | Noted: 2020-09-12

## 2020-09-12 NOTE — PROGRESS NOTES
CHIEF COMPLAINT:   1- Bilateral R>L knee weakness and pain/ extension lag bilateral knees/ neuropathy. 2- Bilateral hand numbness/ neuropathy, possible CTS. HISTORY:  Mr. Elizabeth Alfredo 78 y.o.  male well known to me presents today for f/u evaluation of bilateral knee extension lag R>L which started over 10 years ago.  He is still complaining of dull pain 4/10. Pain is increase with standing and walking and decrease with rest. He feels his legs weak and knees wing sometimes, dull achy pain by the end of the day. Alleviating factors: rest. No radiation and no numbness and tingling sensation. No other complaint. No h/o trauma or gout. The patient is known to me for right lateral ankle ATFL ankle sprain. He also c/o bilateral hand numbness.       DATE OF INJURY: July 2020     Past Medical History:   Diagnosis Date    Anxiety     Arthritis     Diabetes mellitus ([facility] [address]     High blood pressure     [place] (hard of hearing)     Hyperlipidemia     Irregular heart beat     Wears glasses     Wears hearing aid in both ears     Wears partial dentures        Past Surgical History:   Procedure Laterality Date    COLONOSCOPY      CYST REMOVAL Left 5/21/15    FOOT SURGERY Left     cyst removed from foot    JOINT REPLACEMENT Right     shoulder    LUMBAR SPINE SURGERY Bilateral 6/12/2019    BILATERAL L4 TRANSFORAMINAL EPIDURAL STEROID INJECTION WITH FLUOROSCOPY performed by Chencho Sorto MD at [address] [place] [address] Bilateral 6/26/2019    BILATERAL L4 TRANSFORAMINAL EPIDURAL STEROID INJECTION WITH FLUOROSCOPY performed by Chencho Sorto MD at [address] [place] [address] N/A 10/14/2019    MICROLUMBAR LAMINECTOMY L4-5, 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, performed by Sharlene Garcia MD at [address] [place] [address]      tumor removed from neck    SHOULDER ARTHROPLASTY Right 2/17/15    SHOULDER SURGERY Bilateral     WRIST SURGERY         Social History     Socioeconomic History    Marital status:      Spouse name: Not on file    Number of children: 3    Years of education: Not on file    Highest education level: Not on file   Occupational History    Occupation: Retired   Social Needs    Financial resource strain: Not on file    Food insecurity     Worry: Not on file     Inability: Not on file   [employer] needs     Medical: Not on file     Non-medical: Not on file   Tobacco Use    Smoking status: Former Smoker     Last attempt to quit: 1975     Years since quittin.3    Smokeless tobacco: Never Used   Substance and Sexual Activity    Alcohol use: No    Drug use: No    Sexual activity: Not Currently   Lifestyle    Physical activity     Days per week: Not on file     Minutes per session: Not on file    Stress: Not on file   Relationships    Social connections     Talks on phone: Not on file     Gets together: Not on file     Attends Gnosticist service: Not on file     Active member of club or organization: Not on file     Attends meetings of clubs or organizations: Not on file     Relationship status: Not on file    Intimate partner violence     Fear of current or ex partner: Not on file     Emotionally abused: Not on file     Physically abused: Not on file     Forced sexual activity: Not on file   Other Topics Concern    Not on file   Social History Narrative    Not on file       Family History   Problem Relation Age of Onset    No Known Problems Mother     No Known Problems Father     Heart Disease Brother     Heart Disease Brother        Current Outpatient Medications on File Prior to Visit   Medication Sig Dispense Refill    rosuvastatin (CRESTOR) 20 MG tablet Take 20 mg by mouth every other day      empagliflozin (JARDIANCE) 10 MG tablet Take 10 mg by mouth daily      naproxen (NAPROSYN) 500 MG tablet Take 1 tablet by mouth 2 times daily (with meals) for 14 days 28 tablet 0    pioglitazone (ACTOS) 30 MG tablet Take 30 mg by mouth daily      atorvastatin (LIPITOR) 80 MG tablet Take 80 mg by mouth nightly      Multiple Vitamins-Minerals (MULTIVITAMIN ADULT PO) Take 1 tablet by mouth daily      zolpidem (AMBIEN) 10 MG tablet Take by mouth nightly.  ibuprofen (ADVIL;MOTRIN) 200 MG tablet Take 200 mg by mouth every 6 hours as needed for Pain      metFORMIN (GLUCOPHAGE) 1000 MG tablet Take 1,000 mg by mouth 2 times daily (with meals)      valsartan-hydrochlorothiazide (DIOVAN-HCT) 160-12.5 MG per tablet Take 1 tablet by mouth daily.  glipiZIDE (GLUCOTROL) 5 MG tablet Take 5 mg by mouth.  sertraline (ZOLOFT) 25 MG tablet Take 25 mg by mouth nightly.  Omega-3 Fatty Acids (FISH OIL) 1000 MG CPDR   Take 1,000 mg by mouth 2 times daily       aspirin 81 MG tablet Take 81 mg by mouth daily. No current facility-administered medications on file prior to visit. Pertinent items are noted in HPI  Review of systems reviewed from Patient History Form dated on 8/12/2020 and available in the patient's chart under the Media tab. No change. PHYSICAL EXAMINATION:  Mr. Barb Brady is a very pleasant 78 y.o.  male who presents today in no acute distress, awake, alert, and oriented. He is well dressed, nourished and  groomed. Patient with normal affect. Height is  5' 10\" (1.778 m), weight is 182 lb (82.6 kg), Body mass index is 26.11 kg/m². Resting respiratory rate is 16. Examination of the gait, showed that the patient walks heel-toe with a leg dragging type gait and a limp.  Examination of both knees showing decrease active extension ROM, with about 15 degree lag bilateral, but full passive extension, mild crepitus, tenderness on medial joint line, stable to varus and valgus stress. He has intact sensation and good pedal pulses. He has good strength in 2 planes, and has mild tenderness on deep palpation over the medial joint line. Knee reflex 1+ bilaterally.  Quad and patellar tendon are intact

## 2020-09-17 ENCOUNTER — HOSPITAL ENCOUNTER (OUTPATIENT)
Dept: PHYSICAL THERAPY | Age: 80
Setting detail: THERAPIES SERIES
Discharge: HOME | End: 2020-09-17
Payer: MEDICARE

## 2020-09-17 PROCEDURE — 97110 THERAPEUTIC EXERCISES: CPT

## 2020-09-17 PROCEDURE — 97530 THERAPEUTIC ACTIVITIES: CPT

## 2020-09-17 PROCEDURE — 97161 PT EVAL LOW COMPLEX 20 MIN: CPT

## 2020-09-17 NOTE — PLAN OF CARE
[address] [address], [address] [place], [place] [address]  Phone: [phone]   Fax: [phone]                                                       Physical Therapy Certification    Dear Referring Practitioner: Yony Giles,    We had the pleasure of evaluating the following patient for physical therapy services at [address] [place] [address]. A summary of our findings can be found in the initial assessment below. This includes our plan of care. If you have any questions or concerns regarding these findings, please do not hesitate to contact me at the office phone number checked above. Thank you for the referral.       Physician Signature:_______________________________Date:__________________  By signing above (or electronic signature), therapists plan is approved by physician      Patient: Christel Freeman   : 1940   MRN: [id number]  Referring Physician: Referring Practitioner: Yony Giles      Evaluation Date: 2020      Medical Diagnosis Information:  Diagnosis: Neuropathy G62.9   Treatment Diagnosis: Impaired balance, decreased ankle/hip/knee strength and stability, increased fall risk                                         Insurance information: PT Insurance Information: Medicare     Precautions/ Contra-indications:   Latex Allergy:  [x]NO      []YES   Preferred Language for Healthcare:   [x]English       []other    SUBJECTIVE: Patient stated complaint: Pt referred for bilateral weakness, R > L, and pain with extensor leg bilateral knees as well as neuropathy. Started over 10 years ago. C/O dull pain. Increased with standing/walking, decreased with rest.  Feels legs are weak and knees buckle sometimes, dull achy pain by end of the day. Denies N/T. Having more trouble with walking, has had a lot of falls, and has mentally messed him up.   Last fall was 1 week ago, getting up in middle of night to go to the bathroom. Hit tailbone on the toilet. Has trouble with walking, walks approx 1 city block then legs start to really bother him and has to sit. Was walking a lot more prior to Felix, where he was walking around [facility] 5-6 laps. Feels like he falls frequently, probably every 6 weeks or so. Relevant Medical History: right lateral ankle sprain, possible carpal tunnel syndrome, bilateral hand numbness/neuropathy, anxiety/arthritis, DM, HTN, Hyperlipidemia, hearing issues, history of 2 fractured ankles with PT  Functional Outcome: To be filled out next visit       Pain Scale: 0/10 at rest, 3-7/10 bilateral knees   Easing factors:  rest  Provocative factors: standing/walking     Type: []Constant   [x]Intermittent  []Radiating []Localized []Other:     Numbness/Tingling: Neuropathy BLE, also in hands (potential carpal tunnel)    Occupation/School: retired     Living Status/Prior Level of Function:Prior to this injury / incident, pt was independent with ADLs and IADLs. 1 story home. 1 step into home. With wife, does cooking, and helps cleaning. Riding lawnmower , difficulty with trimming. Enjoyed golfing a lot but hasn't golfed in 3 years.          OBJECTIVE:   Palpation:     Functional Mobility/Transfers: Slow, steady, SBA with use of SPC     Posture:     Bandages/Dressings/Incisions:     Gait: (include devices/WB status): Ambulates majority of time with RW, wooden cane in home, RW in community         PROM AROM    L R L R   Hip Flexion       Hip Abduction       Hip ER       Hip IR       Knee Flexion WNL WNL     Knee Extension WNL WNL Limited by strength Limited by strength    Dorsiflexion        Plantarflexion        Inversion        Eversion            Strength (0-5) / Myotomes Left Right   Hip Flexion - supine     Hip Flexion - seated (L1-2) 4+ 4-   Hip Abduction 4- 3+   Hip Adduction     Hip ER 4+ 4+   Hip IR 4 4   Quads (L2-4) 3+ 3-   Hamstrings 5 5   Ankle Dorsiflexion (L4-5) 4- 4+   Ankle Plantarflexion (S1-2)     Ankle Inversion     Ankle Eversion (S1-2)     Great Toe Extension (L5)          Flexibility     Hamstrings (90/90) WFL WFL   ITB Vinayneo Jim)     Quads (Ely's)     Hip Flexor Lela Oddi)          Girth     Mid patella     Suprapatellar     Figure 8     Transmalleolar     Metatarsal Heads         Joint mobility:    [x]Normal    []Hypo   []Hyper    Orthopaedic Special Tests  Positive  Negative  NT Comments    Hip       ISAI / Boni's       FADIR       Scour       Trendelenburg              Knee       Lachman's / Anterior Drawer       Posterior Drawer       Varus Stress       Valgus Stress       Geraldo's        Appley's       Thessaly's       Patellar Tracking              Ankle       Anterior Drawer       Talar Tilt       Munoz       Rayshawn's                   Balance: semi-tandem eyes open: 30 secs B. Tandem eyes open, ~ 2-3 secs B, SLS; Unable. Narrow NOELLE eyes open/closed 30 secs, close SBA                        [x] Patient history, allergies, meds reviewed. Medical chart reviewed. See intake form. Review Of Systems (ROS):  [x]Performed Review of systems (Integumentary, CardioPulmonary, Neurological) by intake and observation. Intake form has been scanned into medical record. Patient has been instructed to contact their primary care physician regarding ROS issues if not already being addressed at this time.       Co-morbidities/Complexities (which will affect course of rehabilitation):   []None           Arthritic conditions   []Rheumatoid arthritis (M05.9)  [x]Osteoarthritis (M19.91)   Cardiovascular conditions   []Hypertension (I10)  []Hyperlipidemia (E78.5)  []Angina pectoris (I20)  []Atherosclerosis (I70)   Musculoskeletal conditions   []Disc pathology   []Congenital spine pathologies   []Prior surgical intervention  []Osteoporosis (M81.8)  []Osteopenia (M85.8)   Endocrine conditions   []Hypothyroid (E03.9)  []Hyperthyroid Gastrointestinal conditions []Constipation (O82.12)   Metabolic conditions   []Morbid obesity (E66.01)  []Diabetes type 1(E10.65) or 2 (E11.65)   []Neuropathy (G60.9)     Pulmonary conditions   []Asthma (J45)  []Coughing   []COPD (J44.9)   Psychological Disorders  []Anxiety (F41.9)  []Depression (F32.9)   []Other:   []Other:          Barriers to/and or personal factors that will affect rehab potential:              [x]Age  []Sex    []Smoker              []Motivation/Lack of Motivation                        [x]Co-Morbidities              []Cognitive Function, education/learning barriers              [x]Environmental, home barriers              []profession/work barriers  [x]past PT/medical experience  []other:  Justification:     Falls Risk Assessment (30 days):   [x] Falls Risk assessed and no intervention required.   [] Falls Risk assessed and Patient requires intervention due to being higher risk   TUG score (>12s at risk):     [] Falls education provided, including        ASSESSMENT:   Functional Impairments:     []Noted lumbar/proximal hip/LE joint hypomobility   [x]Decreased LE functional ROM   [x]Decreased core/proximal hip strength and neuromuscular control   [x]Decreased LE functional strength   [x]Reduced balance/proprioceptive control   []other:      Functional Activity Limitations (from functional questionnaire and intake)   [x]Reduced ability to tolerate prolonged functional positions   []Reduced ability or difficulty with changes of positions or transfers between positions   []Reduced ability to maintain good posture and demonstrate good body mechanics with sitting, bending, and lifting   []Reduced ability to sleep   [] Reduced ability or tolerance with driving and/or computer work   []Reduced ability to perform lifting, carrying tasks   [x]Reduced ability to squat   [x]Reduced ability to forward bend   [x]Reduced ability to ambulate prolonged functional periods/distances/surfaces   [x]Reduced ability to ascend/descend stairs   []Reduced ability to run, hop, cut or jump   []other:    Participation Restrictions   [x]Reduced participation in self care activities   [x]Reduced participation in home management activities   []Reduced participation in work activities   [x]Reduced participation in social activities. [x]Reduced participation in sport/recreation activities. Classification :    [x]Signs/symptoms consistent with post-surgical status including decreased ROM, strength and function.    [x]Signs/symptoms consistent with joint sprain/strain   []Signs/symptoms consistent with patella-femoral syndrome   [x]Signs/symptoms consistent with knee OA/hip OA   []Signs/symptoms consistent with internal derangement of knee/Hip   []Signs/symptoms consistent with functional hip weakness/NMR control      []Signs/symptoms consistent with tendinitis/tendinosis    []signs/symptoms consistent with pathology which may benefit from Dry needling      []other:      Prognosis/Rehab Potential:      []Excellent   [x]Good    [x]Fair   []Poor    Tolerance of evaluation/treatment:    []Excellent   [x]Good    []Fair   []Poor    Physical Therapy Evaluation Complexity Justification  [x] A history of present problem with:  [x] no personal factors and/or comorbidities that impact the plan of care;  []1-2 personal factors and/or comorbidities that impact the plan of care  []3 personal factors and/or comorbidities that impact the plan of care  [x] An examination of body systems using standardized tests and measures addressing any of the following: body structures and functions (impairments), activity limitations, and/or participation restrictions;:  [x] a total of 1-2 or more elements   [] a total of 3 or more elements   [] a total of 4 or more elements   [x] A clinical presentation with:  [x] stable and/or uncomplicated characteristics   [] evolving clinical presentation with changing characteristics  [] unstable and unpredictable characteristics;   [x] Clinical decision making of [x] low, [] moderate, [] high complexity using standardized patient assessment instrument and/or measurable assessment of functional outcome. [x] EVAL (LOW) 71843 (typically 15 minutes face-to-face)  [] EVAL (MOD) 82749 (typically 30 minutes face-to-face)  [] EVAL (HIGH) 65243 (typically 45 minutes face-to-face)  [] RE-EVAL     PLAN:   Frequency/Duration:  2 days per week for 6 Weeks:  Interventions:  [x]  Therapeutic exercise including: strength training, ROM, for Lower extremity and core   [x]  NMR activation and proprioception for LE, Glutes and Core   [x]  Manual therapy as indicated for LE, Hip and spine to include: Dry Needling/IASTM, STM, PROM, Gr I-IV mobilizations, manipulation. [x] Modalities as needed that may include: thermal agents, E-stim, Biofeedback, US, iontophoresis as indicated  [x] Patient education on joint protection, postural re-education, activity modification, progression of HEP. HEP instruction: Written HEP instructions provided and reviewed     GOALS:  Patient stated goal: strengthen legs to do stuff, such as golfing if able   [] Progressing: [] Met: [] Not Met: [] Adjusted    Therapist goals for Patient:   Short Term Goals: To be achieved in: 2 weeks  1. Independent in HEP and progression per patient tolerance, in order to prevent re-injury. [] Progressing: [] Met: [] Not Met: [] Adjusted  2. Patient will have a decrease in pain to facilitate improvement in movement, function, and ADLs as indicated by Functional Deficits. [] Progressing: [] Met: [] Not Met: [] Adjusted    Long Term Goals: To be achieved in: 6 weeks  1. Disability index score of 30% or less for the LEFS to assist with reaching prior level of function. [] Progressing: [] Met: [] Not Met: [] Adjusted  3.  Patient will demonstrate an increase in Strength to at least 4/5 in RLE as well as good proximal hip strength and control to allow for proper functional mobility as indicated by patients Functional Deficits. [] Progressing: [] Met: [] Not Met: [] Adjusted  4. Patient will return to functional activities including ambulating 3-4 blocks or around [facility] by patient's home without increased symptoms or restriction to resume PLOF   [] Progressing: [] Met: [] Not Met: [] Adjusted  5.  Pt will reduce risk for falls by improving semi-tandem balance to 30 secs   [] Progressing: [] Met: [] Not Met: [] Adjusted     Electronically signed by:  Nayeli Moreno PT

## 2020-09-17 NOTE — FLOWSHEET NOTE
[facility] Physical Therapy  Phone: [phone]   Fax: [phone]    Physical Therapy Daily Treatment Note  Date:  2020    Patient Name:  Morgan Chris    :  1940  MRN: [id number]  Medical/Treatment Diagnosis Information:  · Diagnosis: Neuropathy G62.9  · Treatment Diagnosis: Impaired balance, decreased ankle/hip/knee strength and stability, increased fall risk  Insurance/Certification information:  PT Insurance Information: Medicare  Physician Information:  Referring Practitioner: Mandie Godwin  Plan of care signed (Y/N): []  Yes [x]  No     Date of Patient follow up with Physician:      Progress Report: []  Yes  [x]  No     Date Range for reporting period:  Beginnin20  Ending:     Progress report due (10 Rx/or 30 days whichever is less): visit #71     Recertification due (POC duration/ or 90 days whichever is less): 10/16/20    Visit # Insurance Allowable Auth required? Date Range    [place] []  Yes  [x]  No      Latex Allergy:  [x]NO      []YES  Preferred Language for Healthcare:   [x]English       []other:    Functional Scale:        Date assessed:  LEFS: raw score = ; dysfunction =     Next visits    Pain level:  3-8/10     SUBJECTIVE:  See eval    OBJECTIVE: See eval      RESTRICTIONS/PRECAUTIONS: FALL RISK     Exercises/Interventions:     Therapeutic Exercises (15658) Resistance / level Sets/sec Reps Notes   Bike/Scifit       HS Stretch       IB, HR/TR              T.G.  Squats       Step-Ups               Supine SLR       SAQ       Clamshells       Bridging        LAQ                                                 Therapeutic Activities (36736)              Lateral Band Walk                            Neuromuscular Re-ed (59168)       Airex Balance              Wobbleboard        BOSU Lunges                      Manual Intervention (52546)                                                     Pt. Education:  -patient educated on diagnosis, prognosis and expectations for rehab  -all patient questions were answered    Home Exercise Program:  Pt has issues including ankle pumps, seated Hip flexion, SLR, mini squats he is currently doing given by home health PT  Discussed and demonstrated this date     Therapeutic Exercise and NMR EXR  [] (71159) Provided verbal/tactile cueing for activities related to strengthening, flexibility, endurance, ROM for improvements in  [] LE / Lumbar: LE, proximal hip, and core control with self care, mobility, lifting, ambulation. [] UE / Cervical: cervical, postural, scapular, scapulothoracic and UE control with self care, reaching, carrying, lifting, house/yardwork, driving, computer work.  [] (78479) Provided verbal/tactile cueing for activities related to improving balance, coordination, kinesthetic sense, posture, motor skill, proprioception to assist with   [] LE / lumbar: LE, proximal hip, and core control in self care, mobility, lifting, ambulation and eccentric single leg control. [] UE / cervical: cervical, scapular, scapulothoracic and UE control with self care, reaching, carrying, lifting, house/yardwork, driving, computer work.   [] (76146) Therapist is in constant attendance of 2 or more patients providing skilled therapy interventions, but not providing any significant amount of measurable one-on-one time to either patient, for improvements in  [] LE / lumbar: LE, proximal hip, and core control in self care, mobility, lifting, ambulation and eccentric single leg control. [] UE / cervical: cervical, scapular, scapulothoracic and UE control with self care, reaching, carrying, lifting, house/yardwork, driving, computer work.      NMR and Therapeutic Activities:    [] (53202 or 54224) Provided verbal/tactile cueing for activities related to improving balance, coordination, kinesthetic sense, posture, motor skill, proprioception and motor activation to allow for proper function of   [] LE: / Lumbar core, proximal hip and LE with self care and ADLs  [] UE / Cervical: cervical, postural, scapular, scapulothoracic and UE control with self care, carrying, lifting, driving, computer work.   [] (85533) Gait Re-education- Provided training and instruction to the patient for proper LE, core and proximal hip recruitment and positioning and eccentric body weight control with ambulation re-education including up and down stairs     Home Management Training / Self Care:  [] (85614) Provided self-care/home management training related to activities of daily living and compensatory training, and/or use of adaptive equipment for improvement with: ADLs and compensatory training, meal preparation, safety procedures and instruction in use of adaptive equipment, including bathing, grooming, dressing, personal hygiene, basic household cleaning and chores.      Home Exercise Program:    [x] (08156) Reviewed/Progressed HEP activities related to strengthening, flexibility, endurance, ROM of   [] LE / Lumbar: core, proximal hip and LE for functional self-care, mobility, lifting and ambulation/stair navigation   [] UE / Cervical: cervical, postural, scapular, scapulothoracic and UE control with self care, reaching, carrying, lifting, house/yardwork, driving, computer work  [] (54997)Reviewed/Progressed HEP activities related to improving balance, coordination, kinesthetic sense, posture, motor skill, proprioception of   [] LE: core, proximal hip and LE for self care, mobility, lifting, and ambulation/stair navigation    [] UE / Cervical: cervical, postural,  scapular, scapulothoracic and UE control with self care, reaching, carrying, lifting, house/yardwork, driving, computer work    Manual Treatments:  PROM / STM / Oscillations-Mobs:  G-I, II, III, IV (PA's, Inf., Post.)  [] (89961) Provided manual therapy to mobilize LE, proximal hip and/or LS spine soft tissue/joints for the purpose of modulating pain, promoting relaxation,  increasing ROM, reducing/eliminating soft tissue swelling/inflammation/restriction, improving soft tissue extensibility and allowing for proper ROM for normal function with   [] LE / lumbar: self care, mobility, lifting and ambulation. [] UE / Cervical: self care, reaching, carrying, lifting, house/yardwork, driving, computer work. Modalities:  [] (62747) Vasopneumatic compression: Utilized vasopneumatic compression to decrease edema / swelling for the purpose of improving mobility and quad tone / recruitment which will allow for increased overall function including but not limited to self-care, transfers, ambulation, and ascending / descending stairs. Modalities:      Charges:  Timed Code Treatment Minutes: 15   Total Treatment Minutes: 42     [x] EVAL - LOW (81622)   [] EVAL - MOD (19943)  [] EVAL - HIGH (12903)  [] RE-EVAL (89243)  [x] GG(22182) x 1       [] Ionto  [] NMR (86862) x       [] Vaso  [] Manual (63140) x       [] Ultrasound  [x] TA x 1       [] Mech Traction (98174)  [] Aquatic Therapy x     [] ES (un) (40488):   [] Home Management Training x  [] ES(attended) (07077)   [] Dry Needling 1-2 muscles (71719):  [] Dry Needling 3+ muscles (030974)  [] Group:      [] Other:     GOALS: Patient stated goal: strengthen legs to do stuff, such as golfing if able   []? Progressing: []? Met: []? Not Met: []? Adjusted     Therapist goals for Patient:   Short Term Goals: To be achieved in: 2 weeks  1. Independent in HEP and progression per patient tolerance, in order to prevent re-injury. []? Progressing: []? Met: []? Not Met: []? Adjusted  2. Patient will have a decrease in pain to facilitate improvement in movement, function, and ADLs as indicated by Functional Deficits. []? Progressing: []? Met: []? Not Met: []? Adjusted     Long Term Goals: To be achieved in: 6 weeks  1. Disability index score of 30% or less for the LEFS to assist with reaching prior level of function. []? Progressing: []? Met: []?  Not Met: []? Adjusted  3. Patient will demonstrate an increase in Strength to at least 4/5 in RLE as well as good proximal hip strength and control to allow for proper functional mobility as indicated by patients Functional Deficits. []? Progressing: []? Met: []? Not Met: []? Adjusted  4. Patient will return to functional activities including ambulating 3-4 blocks or around [facility] by patient's home without increased symptoms or restriction to resume PLOF   []? Progressing: []? Met: []? Not Met: []? Adjusted  5. Pt will reduce risk for falls by improving semi-tandem balance to 30 secs   []? Progressing: []? Met: []? Not Met: []? Adjusted         Overall Progression Towards Functional goals/ Treatment Progress Update:  [] Patient is progressing as expected towards functional goals listed. [] Progression is slowed due to complexities/Impairments listed. [] Progression has been slowed due to co-morbidities. [x] Plan just implemented, too soon to assess goals progression <30days   [] Goals require adjustment due to lack of progress  [] Patient is not progressing as expected and requires additional follow up with physician  [] Other    Persisting Functional Limitations/Impairments:  []Sleeping []Sitting               [x]Standing [x]Transfers        [x]Walking []Kneeling               [x]Stairs [x]Squatting / bending   []ADLs [x]Reaching  [x]Lifting  [x]Housework  []Driving []Job related tasks  []Sports/Recreation []Other:        ASSESSMENT:  See eval  Treatment/Activity Tolerance:  [] Patient able to complete tx [] Patient limited by fatigue  [] Patient limited by pain  [] Patient limited by other medical complications  [] Other:     Prognosis: [] Good [] Fair  [] Poor    Patient Requires Follow-up: [x] Yes  [] No    Plan for next treatment session:  Balance, LE strengthening    PLAN: See eval. PT 2x / week for 6 weeks.    [] Continue per plan of care [] Alter current plan (see comments)  [x] Plan of care initiated [] Hold pending MD visit [] Discharge    Electronically signed by: Sandra Mcgraw PT    Note: If patient does not return for scheduled/ recommended follow up visits, this note will serve as a discharge from care along with most recent update on progress.

## 2020-09-22 ENCOUNTER — HOSPITAL ENCOUNTER (OUTPATIENT)
Dept: PHYSICAL THERAPY | Age: 80
Setting detail: THERAPIES SERIES
Discharge: HOME | End: 2020-09-22
Payer: MEDICARE

## 2020-09-22 PROCEDURE — 97112 NEUROMUSCULAR REEDUCATION: CPT

## 2020-09-22 PROCEDURE — 97110 THERAPEUTIC EXERCISES: CPT

## 2020-09-22 NOTE — FLOWSHEET NOTE
[address] [facility] Physical Therapy  Phone: [phone]   Fax: [phone]    Physical Therapy Daily Treatment Note  Date:  2020    Patient Name:  Eleonora Miller    :  1940  MRN: [id number]  Medical/Treatment Diagnosis Information:  · Diagnosis: Neuropathy G62.9  · Treatment Diagnosis: Impaired balance, decreased ankle/hip/knee strength and stability, increased fall risk  Insurance/Certification information:  PT Insurance Information: Medicare  Physician Information:  Referring Practitioner: Claudia Gandhi  Plan of care signed (Y/N): []  Yes [x]  No     Date of Patient follow up with Physician:      Progress Report: []  Yes  [x]  No     Date Range for reporting period:  Beginnin20  Ending:     Progress report due (10 Rx/or 30 days whichever is less): visit #39     Recertification due (POC duration/ or 90 days whichever is less): 10/16/20    Visit # Insurance Allowable Auth required? Date Range    [place] []  Yes  [x]  No      Latex Allergy:  [x]NO      []YES  Preferred Language for Healthcare:   [x]English       []other:    Functional Scale:        Date assessed:  LEFS: raw score = ; dysfunction =     Next visits    Pain level:  3-8/10     SUBJECTIVE:  No new changes, did the exercises. Hasn't fallen in 2 weeks.       OBJECTIVE: See eval      RESTRICTIONS/PRECAUTIONS: FALL RISK     Exercises/Interventions:     Therapeutic Exercises (55995) Resistance / level Sets/sec Reps Notes   Scifit X 4 mins       HS Stretch  20\"  X 2     IB, HR/TR  2 X 10  30\"x 2   TR difficult d/t hurt right big toe           T.G. Squats       Step-Ups Fwd 4\"  X 10           Supine SLR       SAQ       Clamshells Lime 2 X 15   Added   Bridging    X 10     LAQ   X 10 B  Added, partial range for RLE                                              Therapeutic Activities (96712)              Lateral Band Walk                            Neuromuscular Re-ed (09091)       Airex Tandem Balance   20\" x 2 9/22 Added          Wobbleboard        ROHITH Diaz Fwd   X 15 B 9/22 Added                  Manual Intervention (66300)                                                     Pt. Education:  -patient educated on diagnosis, prognosis and expectations for rehab  -all patient questions were answered    Home Exercise Program:  Pt has handout including ankle pumps, seated Hip flexion, SLR, mini squats he is currently doing given by home health PT  Discussed and demonstrated this date     Therapeutic Exercise and NMR EXR  [] (94996) Provided verbal/tactile cueing for activities related to strengthening, flexibility, endurance, ROM for improvements in  [] LE / Lumbar: LE, proximal hip, and core control with self care, mobility, lifting, ambulation. [] UE / Cervical: cervical, postural, scapular, scapulothoracic and UE control with self care, reaching, carrying, lifting, house/yardwork, driving, computer work.  [] (77163) Provided verbal/tactile cueing for activities related to improving balance, coordination, kinesthetic sense, posture, motor skill, proprioception to assist with   [] LE / lumbar: LE, proximal hip, and core control in self care, mobility, lifting, ambulation and eccentric single leg control. [] UE / cervical: cervical, scapular, scapulothoracic and UE control with self care, reaching, carrying, lifting, house/yardwork, driving, computer work.   [] (54120) Therapist is in constant attendance of 2 or more patients providing skilled therapy interventions, but not providing any significant amount of measurable one-on-one time to either patient, for improvements in  [] LE / lumbar: LE, proximal hip, and core control in self care, mobility, lifting, ambulation and eccentric single leg control. [] UE / cervical: cervical, scapular, scapulothoracic and UE control with self care, reaching, carrying, lifting, house/yardwork, driving, computer work.      NMR and Therapeutic Activities:    [] (49562 or 19767) Provided verbal/tactile cueing for activities related to improving balance, coordination, kinesthetic sense, posture, motor skill, proprioception and motor activation to allow for proper function of   [] LE: / Lumbar core, proximal hip and LE with self care and ADLs  [] UE / Cervical: cervical, postural, scapular, scapulothoracic and UE control with self care, carrying, lifting, driving, computer work.   [] (86641) Gait Re-education- Provided training and instruction to the patient for proper LE, core and proximal hip recruitment and positioning and eccentric body weight control with ambulation re-education including up and down stairs     Home Management Training / Self Care:  [] (40625) Provided self-care/home management training related to activities of daily living and compensatory training, and/or use of adaptive equipment for improvement with: ADLs and compensatory training, meal preparation, safety procedures and instruction in use of adaptive equipment, including bathing, grooming, dressing, personal hygiene, basic household cleaning and chores.      Home Exercise Program:    [x] (58450) Reviewed/Progressed HEP activities related to strengthening, flexibility, endurance, ROM of   [] LE / Lumbar: core, proximal hip and LE for functional self-care, mobility, lifting and ambulation/stair navigation   [] UE / Cervical: cervical, postural, scapular, scapulothoracic and UE control with self care, reaching, carrying, lifting, house/yardwork, driving, computer work  [] (98755)Reviewed/Progressed HEP activities related to improving balance, coordination, kinesthetic sense, posture, motor skill, proprioception of   [] LE: core, proximal hip and LE for self care, mobility, lifting, and ambulation/stair navigation    [] UE / Cervical: cervical, postural,  scapular, scapulothoracic and UE control with self care, reaching, carrying, lifting, house/yardwork, driving, computer work    Manual Treatments:  PROM / STM / Oscillations-Mobs:  G-I, II, III, IV (PA's, Inf., Post.)  [] (85646) Provided manual therapy to mobilize LE, proximal hip and/or LS spine soft tissue/joints for the purpose of modulating pain, promoting relaxation,  increasing ROM, reducing/eliminating soft tissue swelling/inflammation/restriction, improving soft tissue extensibility and allowing for proper ROM for normal function with   [] LE / lumbar: self care, mobility, lifting and ambulation. [] UE / Cervical: self care, reaching, carrying, lifting, house/yardwork, driving, computer work. Modalities:  [] (10351) Vasopneumatic compression: Utilized vasopneumatic compression to decrease edema / swelling for the purpose of improving mobility and quad tone / recruitment which will allow for increased overall function including but not limited to self-care, transfers, ambulation, and ascending / descending stairs. Modalities:      Charges:  Timed Code Treatment Minutes: 40   Total Treatment Minutes: 40     [] EVAL - LOW (62115)   [] EVAL - MOD (75464)  [] EVAL - HIGH (75380)  [] RE-EVAL (40720)  [x] JT(75514) x 2       [] Ionto  [x] NMR (92866) x 1       [] Vaso  [] Manual (78709) x       [] Ultrasound  [] TA x 1       [] Mech Traction (69834)  [] Aquatic Therapy x     [] ES (un) (58796):   [] Home Management Training x  [] ES(attended) (44044)   [] Dry Needling 1-2 muscles (17873):  [] Dry Needling 3+ muscles (786992)  [] Group:      [] Other:     GOALS: Patient stated goal: strengthen legs to do stuff, such as golfing if able   []? Progressing: []? Met: []? Not Met: []? Adjusted     Therapist goals for Patient:   Short Term Goals: To be achieved in: 2 weeks  1. Independent in HEP and progression per patient tolerance, in order to prevent re-injury. []? Progressing: []? Met: []? Not Met: []? Adjusted  2. Patient will have a decrease in pain to facilitate improvement in movement, function, and ADLs as indicated by Functional Deficits.   []? Progressing: []? Met: []? Not Met: []? Adjusted     Long Term Goals: To be achieved in: 6 weeks  1. Disability index score of 30% or less for the LEFS to assist with reaching prior level of function. []? Progressing: []? Met: []? Not Met: []? Adjusted  3. Patient will demonstrate an increase in Strength to at least 4/5 in RLE as well as good proximal hip strength and control to allow for proper functional mobility as indicated by patients Functional Deficits. []? Progressing: []? Met: []? Not Met: []? Adjusted  4. Patient will return to functional activities including ambulating 3-4 blocks or around [facility] by patient's home without increased symptoms or restriction to resume PLOF   []? Progressing: []? Met: []? Not Met: []? Adjusted  5. Pt will reduce risk for falls by improving semi-tandem balance to 30 secs   []? Progressing: []? Met: []? Not Met: []? Adjusted         Overall Progression Towards Functional goals/ Treatment Progress Update:  [] Patient is progressing as expected towards functional goals listed. [] Progression is slowed due to complexities/Impairments listed. [] Progression has been slowed due to co-morbidities. [x] Plan just implemented, too soon to assess goals progression <30days   [] Goals require adjustment due to lack of progress  [] Patient is not progressing as expected and requires additional follow up with physician  [] Other    Persisting Functional Limitations/Impairments:  []Sleeping []Sitting               [x]Standing [x]Transfers        [x]Walking []Kneeling               [x]Stairs [x]Squatting / bending   []ADLs [x]Reaching  [x]Lifting  [x]Housework  []Driving []Job related tasks  []Sports/Recreation []Other:        ASSESSMENT:  Good progression and tolerance of exercises today. Demonstrates with decreased hip strength and gait mechanics, with dependance on SPC to maintain upright consistently.    Has a lot of weakness present in right quad with difficulty lifting into knee extension for anti-gravity positions. Continued focus on balance/proprioception and LE strengthening to improve fall risk and maximize functional mobility   Treatment/Activity Tolerance:  [] Patient able to complete tx [] Patient limited by fatigue  [] Patient limited by pain  [] Patient limited by other medical complications  [] Other:     Prognosis: [x] Good [] Fair  [] Poor    Patient Requires Follow-up: [x] Yes  [] No    Plan for next treatment session:  Balance, LE strengthening    PLAN: See eval. PT 2x / week for 6 weeks. [x] Continue per plan of care [] Alter current plan (see comments)  [] Plan of care initiated [] Hold pending MD visit [] Discharge    Electronically signed by: Brown Tarango PT    Note: If patient does not return for scheduled/ recommended follow up visits, this note will serve as a discharge from care along with most recent update on progress.

## 2020-09-25 ENCOUNTER — HOSPITAL ENCOUNTER (OUTPATIENT)
Dept: PHYSICAL THERAPY | Age: 80
Setting detail: THERAPIES SERIES
Discharge: HOME | End: 2020-09-25
Payer: MEDICARE

## 2020-09-25 PROCEDURE — 97110 THERAPEUTIC EXERCISES: CPT

## 2020-09-25 PROCEDURE — 97112 NEUROMUSCULAR REEDUCATION: CPT

## 2020-09-25 NOTE — FLOWSHEET NOTE
Therapeutic Activities (06300)              Lateral Band Walk                            Neuromuscular Re-ed (13679)       Airex Tandem Balance   20\" x 2 9/22 Added   SLS Airex   20\" x 2 9/25 Added, occ UE support    Wobbleboard    X 20 Ant/Post  60\" x 1 DLS    BOSU Lunges Fwd   X 15 B 9/22 Added    Hip Hikes step-Ups        Alt Toe taps 6\"  X 15 B 9/25 Added, unsupported    Shuttle        Biodex                      Manual Intervention ([phone])                                                     Pt. Education:  -patient educated on diagnosis, prognosis and expectations for rehab  -all patient questions were answered    Home Exercise Program:  Pt has handout including ankle pumps, seated Hip flexion, SLR, mini squats he is currently doing given by home health PT  Discussed and demonstrated this date     Therapeutic Exercise and NMR EXR  [] (67389) Provided verbal/tactile cueing for activities related to strengthening, flexibility, endurance, ROM for improvements in  [] LE / Lumbar: LE, proximal hip, and core control with self care, mobility, lifting, ambulation. [] UE / Cervical: cervical, postural, scapular, scapulothoracic and UE control with self care, reaching, carrying, lifting, house/yardwork, driving, computer work.  [] (63892) Provided verbal/tactile cueing for activities related to improving balance, coordination, kinesthetic sense, posture, motor skill, proprioception to assist with   [] LE / lumbar: LE, proximal hip, and core control in self care, mobility, lifting, ambulation and eccentric single leg control.    [] UE / cervical: cervical, scapular, scapulothoracic and UE control with self care, reaching, carrying, lifting, house/yardwork, driving, computer work.   [] (48795) Therapist is in constant attendance of 2 or more patients providing skilled therapy interventions, but not providing any significant amount of measurable one-on-one time to either patient, for improvements in  [] LE / lumbar: LE, proximal hip, and core control in self care, mobility, lifting, ambulation and eccentric single leg control. [] UE / cervical: cervical, scapular, scapulothoracic and UE control with self care, reaching, carrying, lifting, house/yardwork, driving, computer work. NMR and Therapeutic Activities:    [] (03560 or 67571) Provided verbal/tactile cueing for activities related to improving balance, coordination, kinesthetic sense, posture, motor skill, proprioception and motor activation to allow for proper function of   [] LE: / Lumbar core, proximal hip and LE with self care and ADLs  [] UE / Cervical: cervical, postural, scapular, scapulothoracic and UE control with self care, carrying, lifting, driving, computer work.   [] (34869) Gait Re-education- Provided training and instruction to the patient for proper LE, core and proximal hip recruitment and positioning and eccentric body weight control with ambulation re-education including up and down stairs     Home Management Training / Self Care:  [] (95690) Provided self-care/home management training related to activities of daily living and compensatory training, and/or use of adaptive equipment for improvement with: ADLs and compensatory training, meal preparation, safety procedures and instruction in use of adaptive equipment, including bathing, grooming, dressing, personal hygiene, basic household cleaning and chores.      Home Exercise Program:    [x] (50486) Reviewed/Progressed HEP activities related to strengthening, flexibility, endurance, ROM of   [] LE / Lumbar: core, proximal hip and LE for functional self-care, mobility, lifting and ambulation/stair navigation   [] UE / Cervical: cervical, postural, scapular, scapulothoracic and UE control with self care, reaching, carrying, lifting, house/yardwork, driving, computer work  [] (46075)Reviewed/Progressed HEP activities related to improving balance, coordination, kinesthetic sense, posture, motor Goals: To be achieved in: 2 weeks  1. Independent in HEP and progression per patient tolerance, in order to prevent re-injury. []? Progressing: []? Met: []? Not Met: []? Adjusted  2. Patient will have a decrease in pain to facilitate improvement in movement, function, and ADLs as indicated by Functional Deficits. []? Progressing: []? Met: []? Not Met: []? Adjusted     Long Term Goals: To be achieved in: 6 weeks  1. Disability index score of 30% or less for the LEFS to assist with reaching prior level of function. []? Progressing: []? Met: []? Not Met: []? Adjusted  3. Patient will demonstrate an increase in Strength to at least 4/5 in RLE as well as good proximal hip strength and control to allow for proper functional mobility as indicated by patients Functional Deficits. []? Progressing: []? Met: []? Not Met: []? Adjusted  4. Patient will return to functional activities including ambulating 3-4 blocks or around [facility] by patient's home without increased symptoms or restriction to resume PLOF   []? Progressing: []? Met: []? Not Met: []? Adjusted  5. Pt will reduce risk for falls by improving semi-tandem balance to 30 secs   []? Progressing: []? Met: []? Not Met: []? Adjusted         Overall Progression Towards Functional goals/ Treatment Progress Update:  [] Patient is progressing as expected towards functional goals listed. [] Progression is slowed due to complexities/Impairments listed. [] Progression has been slowed due to co-morbidities.   [x] Plan just implemented, too soon to assess goals progression <30days   [] Goals require adjustment due to lack of progress  [] Patient is not progressing as expected and requires additional follow up with physician  [] Other    Persisting Functional Limitations/Impairments:  []Sleeping []Sitting               [x]Standing [x]Transfers        [x]Walking []Kneeling               [x]Stairs [x]Squatting / bending   []ADLs [x]Reaching  [x]Lifting [x]Housework  []Driving []Job related tasks  []Sports/Recreation []Other:        ASSESSMENT:  Good progression and tolerance of exercises today. Demonstrates with decreased hip strength and gait mechanics, with dependance on SPC to maintain upright consistently. Has a lot of weakness present in right quad with difficulty lifting into knee extension for anti-gravity positions. Continued focus on balance/proprioception and LE strengthening to improve fall risk and maximize functional mobility     Treatment/Activity Tolerance:  [] Patient able to complete tx [] Patient limited by fatigue  [] Patient limited by pain  [] Patient limited by other medical complications  [] Other:     Prognosis: [x] Good [] Fair  [] Poor    Patient Requires Follow-up: [x] Yes  [] No    Plan for next treatment session:  Balance, LE strengthening    PLAN: See kadie. PT 2x / week for 6 weeks. [x] Continue per plan of care [] Alter current plan (see comments)  [] Plan of care initiated [] Hold pending MD visit [] Discharge    Electronically signed by: Flor Stewart PT    Note: If patient does not return for scheduled/ recommended follow up visits, this note will serve as a discharge from care along with most recent update on progress.

## 2020-09-28 ENCOUNTER — HOSPITAL ENCOUNTER (OUTPATIENT)
Dept: PHYSICAL THERAPY | Age: 80
Setting detail: THERAPIES SERIES
Discharge: HOME | End: 2020-09-28
Payer: MEDICARE

## 2020-09-28 PROCEDURE — 97110 THERAPEUTIC EXERCISES: CPT

## 2020-09-28 PROCEDURE — 97112 NEUROMUSCULAR REEDUCATION: CPT

## 2020-09-28 NOTE — FLOWSHEET NOTE
[address] [facility] Therapy  Phone: [phone]   Fax: [phone]    Physical Therapy Daily Treatment Note  Date:  2020    Patient Name:  Heber Caba    :  1940  MRN: [id number]  Medical/Treatment Diagnosis Information:  · Diagnosis: Neuropathy G62.9  · Treatment Diagnosis: Impaired balance, decreased ankle/hip/knee strength and stability, increased fall risk  Insurance/Certification information:  PT Insurance Information: Medicare  Physician Information:  Referring Practitioner: Tiny Craig  Plan of care signed (Y/N): []  Yes [x]  No     Date of Patient follow up with Physician:      Progress Report: []  Yes  [x]  No     Date Range for reporting period:  Beginnin20  Ending:     Progress report due (10 Rx/or 30 days whichever is less): visit #14     Recertification due (POC duration/ or 90 days whichever is less): 10/16/20    Visit # Insurance Allowable Auth required?  Date Range    [place] []  Yes  [x]  No      Latex Allergy:  [x]NO      []YES  Preferred Language for Healthcare:   [x]English       []other:    Functional Scale:        Date assessed:  LEFS: raw score = ; dysfunction =     Next visits    Pain level:  3-8/10;       SUBJECTIVE:   Says knees are aching this morning from the weather, but otherwise things are the same       OBJECTIVE: See eval      RESTRICTIONS/PRECAUTIONS:   FALL RISK     Exercises/Interventions:     Therapeutic Exercises (06915) Resistance / level Sets/sec Reps Notes   Scifit X 4 mins       HS Stretch     IB, HR/TR  2 X 10  30\"x 2   TR difficult d/t hurt right big toe           T.G. Squats   X 12   Added, CGA/min A on/off   Step-Ups Fwd 4\"  X 10 B           Supine SLR Flexion  2 X 10   Added, already doing as part of HEP    SAQ       Clamshells Lime 2 X 15   Added   Bridging   2  X 10     LAQ   X 10 B  Added, partial range for RLE    CKC TKE  3 plates  X 15  Added          Cable Column Cable Walkouts 4-way  2.5 plates  X 3 each 3/06 Added                  Therapeutic Activities (35045)              Lateral Band Walk                            Neuromuscular Re-ed (22139)       Airex Tandem Balance   20\" x 2 9/22 Added   SLS Airex   20\" x 2 9/25 Added, occ UE support    Wobbleboard    X 20 Ant/Post  60\" x 1 DLS    BOSU  Step-Ups    X 15 B 9/22 Added    Hip Hikes step-Ups        Alt Toe taps standing on Airex  6\"  X 15 B 9/25 Added, unsupported    Shuttle        Biodex                      Manual Intervention ([address] [facility])                                                     Pt. Education:  -patient educated on diagnosis, prognosis and expectations for rehab  -all patient questions were answered    Home Exercise Program:  Pt has handout including ankle pumps, seated Hip flexion, SLR, mini squats he is currently doing given by home health PT  Discussed and demonstrated this date     Therapeutic Exercise and NMR EXR  [] (49298) Provided verbal/tactile cueing for activities related to strengthening, flexibility, endurance, ROM for improvements in  [] LE / Lumbar: LE, proximal hip, and core control with self care, mobility, lifting, ambulation. [] UE / Cervical: cervical, postural, scapular, scapulothoracic and UE control with self care, reaching, carrying, lifting, house/yardwork, driving, computer work.  [] (51098) Provided verbal/tactile cueing for activities related to improving balance, coordination, kinesthetic sense, posture, motor skill, proprioception to assist with   [] LE / lumbar: LE, proximal hip, and core control in self care, mobility, lifting, ambulation and eccentric single leg control.    [] UE / cervical: cervical, scapular, scapulothoracic and UE control with self care, reaching, carrying, lifting, house/yardwork, driving, computer work.   [] (36565) Therapist is in constant attendance of 2 or more patients providing skilled therapy interventions, but not providing any significant amount of measurable one-on-one time to either patient, for improvements in  [] LE / lumbar: LE, proximal hip, and core control in self care, mobility, lifting, ambulation and eccentric single leg control. [] UE / cervical: cervical, scapular, scapulothoracic and UE control with self care, reaching, carrying, lifting, house/yardwork, driving, computer work. NMR and Therapeutic Activities:    [] (84221 or 47142) Provided verbal/tactile cueing for activities related to improving balance, coordination, kinesthetic sense, posture, motor skill, proprioception and motor activation to allow for proper function of   [] LE: / Lumbar core, proximal hip and LE with self care and ADLs  [] UE / Cervical: cervical, postural, scapular, scapulothoracic and UE control with self care, carrying, lifting, driving, computer work.   [] (57436) Gait Re-education- Provided training and instruction to the patient for proper LE, core and proximal hip recruitment and positioning and eccentric body weight control with ambulation re-education including up and down stairs     Home Management Training / Self Care:  [] (88026) Provided self-care/home management training related to activities of daily living and compensatory training, and/or use of adaptive equipment for improvement with: ADLs and compensatory training, meal preparation, safety procedures and instruction in use of adaptive equipment, including bathing, grooming, dressing, personal hygiene, basic household cleaning and chores.      Home Exercise Program:    [x] (95047) Reviewed/Progressed HEP activities related to strengthening, flexibility, endurance, ROM of   [] LE / Lumbar: core, proximal hip and LE for functional self-care, mobility, lifting and ambulation/stair navigation   [] UE / Cervical: cervical, postural, scapular, scapulothoracic and UE control with self care, reaching, carrying, lifting, house/yardwork, driving, computer work  [] (45247)Reviewed/Progressed HEP activities related to improving balance, coordination, kinesthetic sense, posture, motor skill, proprioception of   [] LE: core, proximal hip and LE for self care, mobility, lifting, and ambulation/stair navigation    [] UE / Cervical: cervical, postural,  scapular, scapulothoracic and UE control with self care, reaching, carrying, lifting, house/yardwork, driving, computer work    Manual Treatments:  PROM / STM / Oscillations-Mobs:  G-I, II, III, IV (PA's, Inf., Post.)  [] (23117) Provided manual therapy to mobilize LE, proximal hip and/or LS spine soft tissue/joints for the purpose of modulating pain, promoting relaxation,  increasing ROM, reducing/eliminating soft tissue swelling/inflammation/restriction, improving soft tissue extensibility and allowing for proper ROM for normal function with   [] LE / lumbar: self care, mobility, lifting and ambulation. [] UE / Cervical: self care, reaching, carrying, lifting, house/yardwork, driving, computer work. Modalities:  [] (39493) Vasopneumatic compression: Utilized vasopneumatic compression to decrease edema / swelling for the purpose of improving mobility and quad tone / recruitment which will allow for increased overall function including but not limited to self-care, transfers, ambulation, and ascending / descending stairs. Modalities:      Charges:  Timed Code Treatment Minutes: 45   Total Treatment Minutes: 45     [] EVAL - LOW (21688)   [] EVAL - MOD (98565)  [] EVAL - HIGH (52531)  [] RE-EVAL (68647)  [x] NH(33485) x 2       [] Ionto  [x] NMR (45852) x 1       [] Vaso  [] Manual (45939) x       [] Ultrasound  [] TA x 1       [] Mech Traction (17183)  [] Aquatic Therapy x     [] ES (un) (36965):   [] Home Management Training x  [] ES(attended) (50193)   [] Dry Needling 1-2 muscles (98116):  [] Dry Needling 3+ muscles (065557)  [] Group:      [] Other:     GOALS: Patient stated goal: strengthen legs to do stuff, such as golfing if able   []?  Progressing: []? Limitations/Impairments:  []Sleeping []Sitting               [x]Standing [x]Transfers        [x]Walking []Kneeling               [x]Stairs [x]Squatting / bending   []ADLs [x]Reaching  [x]Lifting  [x]Housework  []Driving []Job related tasks  []Sports/Recreation []Other:        ASSESSMENT:  Good progression and tolerance of exercises today. Demonstrates with decreased hip strength and gait mechanics, with dependance on SPC to maintain upright consistently. Has a lot of weakness present in right quad with difficulty lifting into knee extension for anti-gravity positions. Continued focus on balance/proprioception and LE strengthening to improve fall risk and maximize functional mobility     Treatment/Activity Tolerance:  [x] Patient able to complete tx [] Patient limited by fatigue  [] Patient limited by pain  [] Patient limited by other medical complications  [] Other:     Prognosis: [x] Good [] Fair  [] Poor    Patient Requires Follow-up: [x] Yes  [] No    Plan for next treatment session:  Balance, LE strengthening    PLAN: See kadie. PT 2x / week for 6 weeks. [x] Continue per plan of care [] Alter current plan (see comments)  [] Plan of care initiated [] Hold pending MD visit [] Discharge    Electronically signed by: Sebastian Phoenix PT    Note: If patient does not return for scheduled/ recommended follow up visits, this note will serve as a discharge from care along with most recent update on progress. Attending Attestation (For Attendings USE Only)...

## 2020-09-30 ENCOUNTER — PROCEDURE VISIT (OUTPATIENT)
Dept: NEUROLOGY | Age: 80
End: 2020-09-30
Payer: MEDICARE

## 2020-09-30 VITALS — SYSTOLIC BLOOD PRESSURE: 147 MMHG | TEMPERATURE: 97.4 F | HEART RATE: 60 BPM | DIASTOLIC BLOOD PRESSURE: 69 MMHG

## 2020-09-30 PROCEDURE — 95911 NRV CNDJ TEST 9-10 STUDIES: CPT | Performed by: PSYCHIATRY & NEUROLOGY

## 2020-09-30 PROCEDURE — 95886 MUSC TEST DONE W/N TEST COMP: CPT | Performed by: PSYCHIATRY & NEUROLOGY

## 2020-10-01 ENCOUNTER — HOSPITAL ENCOUNTER (OUTPATIENT)
Dept: PHYSICAL THERAPY | Age: 80
Setting detail: THERAPIES SERIES
Discharge: HOME | End: 2020-10-01
Payer: MEDICARE

## 2020-10-01 PROCEDURE — 97112 NEUROMUSCULAR REEDUCATION: CPT

## 2020-10-01 PROCEDURE — 97110 THERAPEUTIC EXERCISES: CPT

## 2020-10-01 NOTE — FLOWSHEET NOTE
[address] [facility]  Phone: [phone]   Fax: [phone]    Physical Therapy Daily Treatment Note  Date:  10/1/2020    Patient Name:  Ailyn Gonzalez    :  1940  MRN: [id number]  Medical/Treatment Diagnosis Information:  · Diagnosis: Neuropathy G62.9  · Treatment Diagnosis: Impaired balance, decreased ankle/hip/knee strength and stability, increased fall risk  Insurance/Certification information:  PT Insurance Information: Medicare  Physician Information:  Referring Practitioner: Sugey Barahona  Plan of care signed (Y/N): []  Yes [x]  No     Date of Patient follow up with Physician:      Progress Report: []  Yes  [x]  No     Date Range for reporting period:  Beginnin20  Ending:     Progress report due (10 Rx/or 30 days whichever is less): visit #96     Recertification due (POC duration/ or 90 days whichever is less): 10/16/20    Visit # Insurance Allowable Auth required?  Date Range    [place] []  Yes  [x]  No      Latex Allergy:  [x]NO      []YES  Preferred Language for Healthcare:   [x]English       []other:    Functional Scale:        Date assessed:  LEFS: raw score = ; dysfunction =     Next visits    Pain level:  3-8/10;       SUBJECTIVE:   Nothing new to report overall, knees are pretty good today     OBJECTIVE: See eval      RESTRICTIONS/PRECAUTIONS:   FALL RISK     Exercises/Interventions:     Therapeutic Exercises (48404) Resistance / level Sets/sec Reps Notes   Scifit X 4 mins       HS Stretch     IB, HR/TR  2 X 10  30\"x 2   TR difficult d/t hurt right big toe           T.G. Squats     Added, CGA/min A on/off   Step-Ups Fwd 4\"  X 10 B           Supine SLR Flexion 2# 2 X 10   Added, already doing as part of HEP    SAQ 2# 2 X 10 10/1 Added   Clamshells Lime 2 X 15   Added   Bridging   2  X 10     LAQ  2 X 10 B  Added, partial range for RLE    CKC TKE  3 plates  X 15  Added          Cable Column        Appointedd 4-way  9/28 Added                  Leg Press                      Therapeutic Activities (86610)              Lateral Band Walk                            Neuromuscular Re-ed (22835)       Airex Tandem Balance   20\" x 2 9/22 Added   SLS Airex   20\" x 2 9/25 Added, occ UE support    Wobbleboard    X 20 Ant/Post  60\" x 1 DLS    BOSU  Step-Ups    X 15 B 9/22 Added    Hip Hikes step-Ups        Alt Toe taps standing on Airex  6\"  X 15 B 9/25 Added, unsupported    Shuttle Step-Up to SLS  Shuttle Hip Abduction   X 10 B  X 10 B Added 10/1   Biodex                      Manual Intervention ([phone])                                                     Pt. Education:  -patient educated on diagnosis, prognosis and expectations for rehab  -all patient questions were answered    Home Exercise Program:  Pt has handout including ankle pumps, seated Hip flexion, SLR, mini squats he is currently doing given by home health PT  Discussed and demonstrated this date      Therapeutic Exercise and NMR EXR  [] (11942) Provided verbal/tactile cueing for activities related to strengthening, flexibility, endurance, ROM for improvements in  [] LE / Lumbar: LE, proximal hip, and core control with self care, mobility, lifting, ambulation. [] UE / Cervical: cervical, postural, scapular, scapulothoracic and UE control with self care, reaching, carrying, lifting, house/yardwork, driving, computer work.  [] (08843) Provided verbal/tactile cueing for activities related to improving balance, coordination, kinesthetic sense, posture, motor skill, proprioception to assist with   [] LE / lumbar: LE, proximal hip, and core control in self care, mobility, lifting, ambulation and eccentric single leg control.    [] UE / cervical: cervical, scapular, scapulothoracic and UE control with self care, reaching, carrying, lifting, house/yardwork, driving, computer work.   [] (23043) Therapist is in constant attendance of 2 or more patients providing skilled therapy interventions, but not providing any significant amount of measurable one-on-one time to either patient, for improvements in  [] LE / lumbar: LE, proximal hip, and core control in self care, mobility, lifting, ambulation and eccentric single leg control. [] UE / cervical: cervical, scapular, scapulothoracic and UE control with self care, reaching, carrying, lifting, house/yardwork, driving, computer work. NMR and Therapeutic Activities:    [] (09128 or 73020) Provided verbal/tactile cueing for activities related to improving balance, coordination, kinesthetic sense, posture, motor skill, proprioception and motor activation to allow for proper function of   [] LE: / Lumbar core, proximal hip and LE with self care and ADLs  [] UE / Cervical: cervical, postural, scapular, scapulothoracic and UE control with self care, carrying, lifting, driving, computer work.   [] (68717) Gait Re-education- Provided training and instruction to the patient for proper LE, core and proximal hip recruitment and positioning and eccentric body weight control with ambulation re-education including up and down stairs     Home Management Training / Self Care:  [] (50481) Provided self-care/home management training related to activities of daily living and compensatory training, and/or use of adaptive equipment for improvement with: ADLs and compensatory training, meal preparation, safety procedures and instruction in use of adaptive equipment, including bathing, grooming, dressing, personal hygiene, basic household cleaning and chores.      Home Exercise Program:    [x] (52052) Reviewed/Progressed HEP activities related to strengthening, flexibility, endurance, ROM of   [] LE / Lumbar: core, proximal hip and LE for functional self-care, mobility, lifting and ambulation/stair navigation   [] UE / Cervical: cervical, postural, scapular, scapulothoracic and UE control with self care, reaching, carrying, lifting, house/yardwork, driving, computer work  [] (00923)Reviewed/Progressed HEP activities related to improving balance, coordination, kinesthetic sense, posture, motor skill, proprioception of   [] LE: core, proximal hip and LE for self care, mobility, lifting, and ambulation/stair navigation    [] UE / Cervical: cervical, postural,  scapular, scapulothoracic and UE control with self care, reaching, carrying, lifting, house/yardwork, driving, computer work    Manual Treatments:  PROM / STM / Oscillations-Mobs:  G-I, II, III, IV (PA's, Inf., Post.)  [] ([id number]) Provided manual therapy to mobilize LE, proximal hip and/or LS spine soft tissue/joints for the purpose of modulating pain, promoting relaxation,  increasing ROM, reducing/eliminating soft tissue swelling/inflammation/restriction, improving soft tissue extensibility and allowing for proper ROM for normal function with   [] LE / lumbar: self care, mobility, lifting and ambulation. [] UE / Cervical: self care, reaching, carrying, lifting, house/yardwork, driving, computer work. Modalities:  [] (70549) Vasopneumatic compression: Utilized vasopneumatic compression to decrease edema / swelling for the purpose of improving mobility and quad tone / recruitment which will allow for increased overall function including but not limited to self-care, transfers, ambulation, and ascending / descending stairs.        Modalities:      Charges:  Timed Code Treatment Minutes: 45   Total Treatment Minutes: 45     [] EVAL - LOW (10576)   [] EVAL - MOD (28015)  [] EVAL - HIGH (74789)  [] RE-EVAL (25120)  [x] FH(86621) x 2       [] Ionto  [x] NMR (97692) x 1       [] Vaso  [] Manual (62544) x       [] Ultrasound  [] TA x 1       [] Mech Traction (14705)  [] Aquatic Therapy x     [] ES (un) (09727):   [] Home Management Training x  [] ES(attended) (24656)   [] Dry Needling 1-2 muscles (97980):  [] Dry Needling 3+ muscles (701161)  [] Group:      [] Other:     GOALS: Patient stated goal: strengthen legs to do stuff, such as golfing if able   []? Progressing: []? Met: []? Not Met: []? Adjusted     Therapist goals for Patient:   Short Term Goals: To be achieved in: 2 weeks  1. Independent in HEP and progression per patient tolerance, in order to prevent re-injury. []? Progressing: []? Met: []? Not Met: []? Adjusted  2. Patient will have a decrease in pain to facilitate improvement in movement, function, and ADLs as indicated by Functional Deficits. []? Progressing: []? Met: []? Not Met: []? Adjusted     Long Term Goals: To be achieved in: 6 weeks  1. Disability index score of 30% or less for the LEFS to assist with reaching prior level of function. []? Progressing: []? Met: []? Not Met: []? Adjusted  3. Patient will demonstrate an increase in Strength to at least 4/5 in RLE as well as good proximal hip strength and control to allow for proper functional mobility as indicated by patients Functional Deficits. []? Progressing: []? Met: []? Not Met: []? Adjusted  4. Patient will return to functional activities including ambulating 3-4 blocks or around [facility] by patient's home without increased symptoms or restriction to resume PLOF   []? Progressing: []? Met: []? Not Met: []? Adjusted  5. Pt will reduce risk for falls by improving semi-tandem balance to 30 secs   []? Progressing: []? Met: []? Not Met: []? Adjusted         Overall Progression Towards Functional goals/ Treatment Progress Update:  [] Patient is progressing as expected towards functional goals listed. [] Progression is slowed due to complexities/Impairments listed. [] Progression has been slowed due to co-morbidities.   [x] Plan just implemented, too soon to assess goals progression <30days   [] Goals require adjustment due to lack of progress  [] Patient is not progressing as expected and requires additional follow up with physician  [] Other    Persisting Functional Limitations/Impairments:  []Sleeping []Sitting               [x]Standing [x]Transfers        [x]Walking []Kneeling               [x]Stairs [x]Squatting / bending   []ADLs [x]Reaching  [x]Lifting  [x]Housework  []Driving []Job related tasks  []Sports/Recreation []Other:        ASSESSMENT:  Good progression and tolerance of exercises today. Demonstrates with decreased hip strength and gait mechanics, with dependance on SPC to maintain upright consistently. Has a lot of weakness present in right quad with difficulty lifting into knee extension for anti-gravity positions. Continued focus on balance/proprioception and LE strengthening to improve fall risk and maximize functional mobility     Treatment/Activity Tolerance:  [x] Patient able to complete tx [] Patient limited by fatigue  [] Patient limited by pain  [] Patient limited by other medical complications  [] Other:     Prognosis: [x] Good [] Fair  [] Poor    Patient Requires Follow-up: [x] Yes  [] No    Plan for next treatment session:  Balance, LE strengthening    PLAN: See nadiya PT 2x / week for 6 weeks. [x] Continue per plan of care [] Alter current plan (see comments)  [] Plan of care initiated [] Hold pending MD visit [] Discharge    Electronically signed by: Erika Benites PT    Note: If patient does not return for scheduled/ recommended follow up visits, this note will serve as a discharge from care along with most recent update on progress.

## 2020-10-02 ENCOUNTER — APPOINTMENT (OUTPATIENT)
Dept: PHYSICAL THERAPY | Age: 80
End: 2020-10-02
Payer: MEDICARE

## 2020-10-05 ENCOUNTER — HOSPITAL ENCOUNTER (OUTPATIENT)
Dept: PHYSICAL THERAPY | Age: 80
Setting detail: THERAPIES SERIES
Discharge: HOME | End: 2020-10-05
Payer: MEDICARE

## 2020-10-05 PROCEDURE — 97112 NEUROMUSCULAR REEDUCATION: CPT

## 2020-10-05 PROCEDURE — 97110 THERAPEUTIC EXERCISES: CPT

## 2020-10-05 NOTE — FLOWSHEET NOTE
[address] [facility]  Phone: [phone]   Fax: [phone]    Physical Therapy Daily Treatment Note  Date:  10/5/2020    Patient Name:  Roel Guido    :  1940  MRN: [id number]  Medical/Treatment Diagnosis Information:  · Diagnosis: Neuropathy G62.9  · Treatment Diagnosis: Impaired balance, decreased ankle/hip/knee strength and stability, increased fall risk  Insurance/Certification information:  PT Insurance Information: Medicare  Physician Information:  Referring Practitioner: Tammi Grissom  Plan of care signed (Y/N): []  Yes [x]  No     Date of Patient follow up with Physician:      Progress Report: []  Yes  [x]  No     Date Range for reporting period:  Beginnin20  Ending:     Progress report due (10 Rx/or 30 days whichever is less): visit #30     Recertification due (POC duration/ or 90 days whichever is less): 10/16/20    Visit # Insurance Allowable Auth required? Date Range    [place] []  Yes  [x]  No      Latex Allergy:  [x]NO      []YES  Preferred Language for Healthcare:   [x]English       []other:    Functional Scale:        Date assessed:  LEFS: raw score = ; dysfunction =     Next visits    Pain level:  3-8/10;       SUBJECTIVE:   Said since Friday he's been having more problems walking out of nowhere, no new pain, not sure what is going on, couldn't have gotten in the house Friday without the cane. Says he thinks the bridging may have been contributing to some of it.      OBJECTIVE: See eval      RESTRICTIONS/PRECAUTIONS:   FALL RISK     Exercises/Interventions:     Therapeutic Exercises (71638) Resistance / level Sets/sec Reps Notes   Scifit X 4 mins       HS Stretch     IB, HR/TR  2 X 10  30\"x 2   TR difficult d/t hurt right big toe           T.G. Squats     Added, CGA/min A on/off   Step-Ups Fwd 4\"  X 10 B           Supine SLR Flexion 2# 2 X 10   Added, already doing as part of HEP    SAQ 2# 2 X 10 10/1 Added Clamshells Lime 2 X 15  9/22 Added   Bridging      LAQ  2 X 10 B 9/22 Added, partial range for RLE    CKC TKE  3 plates 5\" X 15 1/95 Added          Cable Column        Cable Walkouts 4-way  9/28 Added                  Leg Press                      Therapeutic Activities (19849)              Lateral Band Walk              Sit to stands from edge of mat, multiple heights    10/5 Attempted, unable, try at future visits           Neuromuscular Re-ed (04572)       Airex Tandem Balance   20\" x 2 9/22 Added   SLS Airex   20\" x 2 9/25 Added, occ UE support    Wobbleboard    X 20 Ant/Post  60\" x 1 DLS    BOSU  Step-Ups    X 15 B 9/22 Added   10/5  Mild collapse RLE today   Hip Hikes, lateral (foot flat) 2\"  X 10 B 10/5 Added    Alt Toe taps standing on Airex  6\"  X 15 B 9/25 Added, unsupported    Shuttle Step-Up to SLS  Shuttle Hip Abduction   X 10 B  X 10 B Added 10/1   Biodex                      Manual Intervention ([phone])                                                     Pt. Education:  -patient educated on diagnosis, prognosis and expectations for rehab  -all patient questions were answered    Home Exercise Program:  Pt has handout including ankle pumps, seated Hip flexion, SLR, mini squats he is currently doing given by home health PT  Discussed and demonstrated this date      Therapeutic Exercise and NMR EXR  [] (84120) Provided verbal/tactile cueing for activities related to strengthening, flexibility, endurance, ROM for improvements in  [] LE / Lumbar: LE, proximal hip, and core control with self care, mobility, lifting, ambulation.   [] UE / Cervical: cervical, postural, scapular, scapulothoracic and UE control with self care, reaching, carrying, lifting, house/yardwork, driving, computer work.  [] (03082) Provided verbal/tactile cueing for activities related to improving balance, coordination, kinesthetic sense, posture, motor skill, proprioception to assist with   [] LE / lumbar: LE, proximal hip, and core control in self care, mobility, lifting, ambulation and eccentric single leg control. [] UE / cervical: cervical, scapular, scapulothoracic and UE control with self care, reaching, carrying, lifting, house/yardwork, driving, computer work.   [] (55554) Therapist is in constant attendance of 2 or more patients providing skilled therapy interventions, but not providing any significant amount of measurable one-on-one time to either patient, for improvements in  [] LE / lumbar: LE, proximal hip, and core control in self care, mobility, lifting, ambulation and eccentric single leg control. [] UE / cervical: cervical, scapular, scapulothoracic and UE control with self care, reaching, carrying, lifting, house/yardwork, driving, computer work. NMR and Therapeutic Activities:    [] (91145 or 32608) Provided verbal/tactile cueing for activities related to improving balance, coordination, kinesthetic sense, posture, motor skill, proprioception and motor activation to allow for proper function of   [] LE: / Lumbar core, proximal hip and LE with self care and ADLs  [] UE / Cervical: cervical, postural, scapular, scapulothoracic and UE control with self care, carrying, lifting, driving, computer work.   [] (77974) Gait Re-education- Provided training and instruction to the patient for proper LE, core and proximal hip recruitment and positioning and eccentric body weight control with ambulation re-education including up and down stairs     Home Management Training / Self Care:  [] (57708) Provided self-care/home management training related to activities of daily living and compensatory training, and/or use of adaptive equipment for improvement with: ADLs and compensatory training, meal preparation, safety procedures and instruction in use of adaptive equipment, including bathing, grooming, dressing, personal hygiene, basic household cleaning and chores.      Home Exercise Program:    [x] (84123) Reviewed/Progressed HEP activities related to strengthening, flexibility, endurance, ROM of   [] LE / Lumbar: core, proximal hip and LE for functional self-care, mobility, lifting and ambulation/stair navigation   [] UE / Cervical: cervical, postural, scapular, scapulothoracic and UE control with self care, reaching, carrying, lifting, house/yardwork, driving, computer work  [] (25717)Reviewed/Progressed HEP activities related to improving balance, coordination, kinesthetic sense, posture, motor skill, proprioception of   [] LE: core, proximal hip and LE for self care, mobility, lifting, and ambulation/stair navigation    [] UE / Cervical: cervical, postural,  scapular, scapulothoracic and UE control with self care, reaching, carrying, lifting, house/yardwork, driving, computer work    Manual Treatments:  PROM / STM / Oscillations-Mobs:  G-I, II, III, IV (PA's, Inf., Post.)  [] (88495) Provided manual therapy to mobilize LE, proximal hip and/or LS spine soft tissue/joints for the purpose of modulating pain, promoting relaxation,  increasing ROM, reducing/eliminating soft tissue swelling/inflammation/restriction, improving soft tissue extensibility and allowing for proper ROM for normal function with   [] LE / lumbar: self care, mobility, lifting and ambulation. [] UE / Cervical: self care, reaching, carrying, lifting, house/yardwork, driving, computer work. Modalities:  [] (66706) Vasopneumatic compression: Utilized vasopneumatic compression to decrease edema / swelling for the purpose of improving mobility and quad tone / recruitment which will allow for increased overall function including but not limited to self-care, transfers, ambulation, and ascending / descending stairs.        Modalities:      Charges:  Timed Code Treatment Minutes: 45   Total Treatment Minutes: 45     [] EVAL - LOW (34873)   [] EVAL - MOD (06287)  [] EVAL - HIGH (95748)  [] RE-EVAL (94629)  [x] JS(94529) x 2       [] Ionto  [x] NMR (23292) x 1       [] Vaso  [] Manual (04688) x       [] Ultrasound  [] TA x 1       [] Mech Traction (21974)  [] Aquatic Therapy x     [] ES (un) (35023):   [] Home Management Training x  [] ES(attended) (45000)   [] Dry Needling 1-2 muscles (91913):  [] Dry Needling 3+ muscles (939224)  [] Group:      [] Other:     GOALS: Patient stated goal: strengthen legs to do stuff, such as golfing if able   []? Progressing: []? Met: []? Not Met: []? Adjusted     Therapist goals for Patient:   Short Term Goals: To be achieved in: 2 weeks  1. Independent in HEP and progression per patient tolerance, in order to prevent re-injury. []? Progressing: []? Met: []? Not Met: []? Adjusted  2. Patient will have a decrease in pain to facilitate improvement in movement, function, and ADLs as indicated by Functional Deficits. []? Progressing: []? Met: []? Not Met: []? Adjusted     Long Term Goals: To be achieved in: 6 weeks  1. Disability index score of 30% or less for the LEFS to assist with reaching prior level of function. []? Progressing: []? Met: []? Not Met: []? Adjusted  3. Patient will demonstrate an increase in Strength to at least 4/5 in RLE as well as good proximal hip strength and control to allow for proper functional mobility as indicated by patients Functional Deficits. []? Progressing: []? Met: []? Not Met: []? Adjusted  4. Patient will return to functional activities including ambulating 3-4 blocks or around [facility] by patient's home without increased symptoms or restriction to resume PLOF   []? Progressing: []? Met: []? Not Met: []? Adjusted  5. Pt will reduce risk for falls by improving semi-tandem balance to 30 secs   []? Progressing: []? Met: []? Not Met: []? Adjusted         Overall Progression Towards Functional goals/ Treatment Progress Update:  [] Patient is progressing as expected towards functional goals listed. [] Progression is slowed due to complexities/Impairments listed.   [] Progression has been slowed due to co-morbidities. [x] Plan just implemented, too soon to assess goals progression <30days   [] Goals require adjustment due to lack of progress  [] Patient is not progressing as expected and requires additional follow up with physician  [] Other    Persisting Functional Limitations/Impairments:  []Sleeping []Sitting               [x]Standing [x]Transfers        [x]Walking []Kneeling               [x]Stairs [x]Squatting / bending   []ADLs [x]Reaching  [x]Lifting  [x]Housework  []Driving []Job related tasks  []Sports/Recreation []Other:        ASSESSMENT:  Had increasing difficulty with performing sit to stands without excessive use of UE and compensatory strategies. Demonstrates with decreased hip strength and gait mechanics, with dependance on SPC to maintain upright consistently. Has a lot of weakness present in right quad with difficulty lifting into knee extension for anti-gravity positions. Continued focus on balance/proprioception and LE strengthening to improve fall risk and maximize functional mobility     Treatment/Activity Tolerance:  [x] Patient able to complete tx [] Patient limited by fatigue  [] Patient limited by pain  [] Patient limited by other medical complications  [] Other:     Prognosis: [x] Good [] Fair  [] Poor    Patient Requires Follow-up: [x] Yes  [] No    Plan for next treatment session:  Balance, LE strengthening    PLAN: See kadie. PT 2x / week for 6 weeks. [x] Continue per plan of care [] Alter current plan (see comments)  [] Plan of care initiated [] Hold pending MD visit [] Discharge    Electronically signed by: Elham Banegas PT    Note: If patient does not return for scheduled/ recommended follow up visits, this note will serve as a discharge from care along with most recent update on progress.

## 2020-10-09 ENCOUNTER — HOSPITAL ENCOUNTER (OUTPATIENT)
Dept: PHYSICAL THERAPY | Age: 80
Setting detail: THERAPIES SERIES
Discharge: HOME | End: 2020-10-09
Payer: MEDICARE

## 2020-10-09 PROCEDURE — 97112 NEUROMUSCULAR REEDUCATION: CPT

## 2020-10-09 PROCEDURE — 97110 THERAPEUTIC EXERCISES: CPT

## 2020-10-09 PROCEDURE — 97530 THERAPEUTIC ACTIVITIES: CPT

## 2020-10-09 NOTE — FLOWSHEET NOTE
[address] [facility] Physical Therapy  Phone: [phone]   Fax: [phone]    Physical Therapy Daily Treatment Note  Date:  10/9/2020    Patient Name:  Jarad Juarez    :  1940  MRN: [id number]  Medical/Treatment Diagnosis Information:  · Diagnosis: Neuropathy G62.9  · Treatment Diagnosis: Impaired balance, decreased ankle/hip/knee strength and stability, increased fall risk  Insurance/Certification information:  PT Insurance Information: Medicare  Physician Information:  Referring Practitioner: Francisco Escalante  Plan of care signed (Y/N): []  Yes [x]  No     Date of Patient follow up with Physician:      Progress Report: []  Yes  [x]  No     Date Range for reporting period:  Beginnin20  Ending:     Progress report due (10 Rx/or 30 days whichever is less): visit #73     Recertification due (POC duration/ or 90 days whichever is less): 10/16/20    Visit # Insurance Allowable Auth required?  Date Range    [place] []  Yes  [x]  No      Latex Allergy:  [x]NO      []YES  Preferred Language for Healthcare:   [x]English       []other:    Functional Scale:        Date assessed:  LEFS: raw score = ; dysfunction =     Next visit:    Pain level:  3-810       SUBJECTIVE:  Said he's doing pretty good, everything is about the same          OBJECTIVE: See eval      RESTRICTIONS/PRECAUTIONS:   FALL RISK     Exercises/Interventions:     Therapeutic Exercises (93495) Resistance / level Sets/sec Reps Notes   Scifit X 5 mins       HS Stretch     IB, HR/TR  2 X 10  30\" x 2   TR difficult d/t hurt right big toe           T.G. Squats     Added, CGA/min A on/off   Step-Ups Fwd 4\"  X 10 B           Supine SLR Flexion 2# 2 X 10   Added, already doing as part of HEP    SAQ  2 X 10 10/1 Added   Clamshells Lime 2 X 15   Added   Bridging  Lime green 2  X 10     LAQ 2# 2 X 10 B  Added, partial range for RLE    CKC TKE  3 plates 5\" X 15  Added          Cable Column Cable Walkouts 4-way  9/28 Added                  Leg Press                      Therapeutic Activities (95605)              Lateral Band Walk Lime  X 4 laps  10/9 Added           Sit to stands from edge of mat    10/5 Attempted, unable, try at future visits           Partial Mini Squats  Lime green band distal quad 2 X 10 10/9 Added, mod verbal/tactile cues for form                         Neuromuscular Re-ed (44098)       Airex Tandem Balance   20\" x 2 9/22 Added   SLS Airex   20\" x 2 9/25 Added, occ UE support    Wobbleboard    X 20 Ant/Post  X 20 Med/Lat  60\" x 1 DLS    BOSU  Step-Ups    X 15 B 9/22 Added   10/5  Mild collapse RLE today   Hip Hikes, lateral (foot flat) 2\"  X 10 B 10/5 Added    Alt Toe taps standing on Airex  6\"  X 15 B 9/25 Added, unsupported    Shuttle Step-Up to SLS  Shuttle Hip Abduction    2 X 10 B  X 10 B Added 10/1           BiodDecurate               Manual Intervention ([id number])                                                     Pt. Education:  -patient educated on diagnosis, prognosis and expectations for rehab  -all patient questions were answered    Home Exercise Program:  Pt has handout including ankle pumps, seated Hip flexion, SLR, mini squats he is currently doing given by home health PT  Discussed and demonstrated this date      Therapeutic Exercise and NMR EXR  [] (50251) Provided verbal/tactile cueing for activities related to strengthening, flexibility, endurance, ROM for improvements in  [] LE / Lumbar: LE, proximal hip, and core control with self care, mobility, lifting, ambulation.   [] UE / Cervical: cervical, postural, scapular, scapulothoracic and UE control with self care, reaching, carrying, lifting, house/yardwork, driving, computer work.  [] (72497) Provided verbal/tactile cueing for activities related to improving balance, coordination, kinesthetic sense, posture, motor skill, proprioception to assist with   [] LE / lumbar: LE, proximal hip, and core control in self care, mobility, lifting, ambulation and eccentric single leg control. [] UE / cervical: cervical, scapular, scapulothoracic and UE control with self care, reaching, carrying, lifting, house/yardwork, driving, computer work.   [] (52117) Therapist is in constant attendance of 2 or more patients providing skilled therapy interventions, but not providing any significant amount of measurable one-on-one time to either patient, for improvements in  [] LE / lumbar: LE, proximal hip, and core control in self care, mobility, lifting, ambulation and eccentric single leg control. [] UE / cervical: cervical, scapular, scapulothoracic and UE control with self care, reaching, carrying, lifting, house/yardwork, driving, computer work. NMR and Therapeutic Activities:    [] (99561 or 99100) Provided verbal/tactile cueing for activities related to improving balance, coordination, kinesthetic sense, posture, motor skill, proprioception and motor activation to allow for proper function of   [] LE: / Lumbar core, proximal hip and LE with self care and ADLs  [] UE / Cervical: cervical, postural, scapular, scapulothoracic and UE control with self care, carrying, lifting, driving, computer work.   [] (40746) Gait Re-education- Provided training and instruction to the patient for proper LE, core and proximal hip recruitment and positioning and eccentric body weight control with ambulation re-education including up and down stairs     Home Management Training / Self Care:  [] (54664) Provided self-care/home management training related to activities of daily living and compensatory training, and/or use of adaptive equipment for improvement with: ADLs and compensatory training, meal preparation, safety procedures and instruction in use of adaptive equipment, including bathing, grooming, dressing, personal hygiene, basic household cleaning and chores.      Home Exercise Program:    [x] (89736) Reviewed/Progressed HEP activities related to strengthening, flexibility, endurance, ROM of   [] LE / Lumbar: core, proximal hip and LE for functional self-care, mobility, lifting and ambulation/stair navigation   [] UE / Cervical: cervical, postural, scapular, scapulothoracic and UE control with self care, reaching, carrying, lifting, house/yardwork, driving, computer work  [] (77084)Reviewed/Progressed HEP activities related to improving balance, coordination, kinesthetic sense, posture, motor skill, proprioception of   [] LE: core, proximal hip and LE for self care, mobility, lifting, and ambulation/stair navigation    [] UE / Cervical: cervical, postural,  scapular, scapulothoracic and UE control with self care, reaching, carrying, lifting, house/yardwork, driving, computer work    Manual Treatments:  PROM / STM / Oscillations-Mobs:  G-I, II, III, IV (PA's, Inf., Post.)  [] (77062) Provided manual therapy to mobilize LE, proximal hip and/or LS spine soft tissue/joints for the purpose of modulating pain, promoting relaxation,  increasing ROM, reducing/eliminating soft tissue swelling/inflammation/restriction, improving soft tissue extensibility and allowing for proper ROM for normal function with   [] LE / lumbar: self care, mobility, lifting and ambulation. [] UE / Cervical: self care, reaching, carrying, lifting, house/yardwork, driving, computer work. Modalities:  [] (30422) Vasopneumatic compression: Utilized vasopneumatic compression to decrease edema / swelling for the purpose of improving mobility and quad tone / recruitment which will allow for increased overall function including but not limited to self-care, transfers, ambulation, and ascending / descending stairs.        Modalities:      Charges:  Timed Code Treatment Minutes: 58   Total Treatment Minutes: 58     [] EVAL - LOW (64455)   [] EVAL - MOD (86852)  [] EVAL - HIGH (20334)  [] RE-EVAL (93260)  [x] SI(79854) x 2       [] Ionto  [x] NMR (33870) x 1       [] Vaso  [] Manual (90880) x [] Ultrasound  [x] TA x 1       [] [facility] Traction (39454)  [] Aquatic Therapy x     [] ES (un) (73880):   [] Home Management Training x  [] ES(attended) (03699)   [] Dry Needling 1-2 muscles (65056):  [] Dry Needling 3+ muscles (947647)  [] Group:      [] Other:     GOALS: Patient stated goal: strengthen legs to do stuff, such as golfing if able   []? Progressing: []? Met: []? Not Met: []? Adjusted     Therapist goals for Patient:   Short Term Goals: To be achieved in: 2 weeks  1. Independent in HEP and progression per patient tolerance, in order to prevent re-injury. []? Progressing: []? Met: []? Not Met: []? Adjusted  2. Patient will have a decrease in pain to facilitate improvement in movement, function, and ADLs as indicated by Functional Deficits. []? Progressing: []? Met: []? Not Met: []? Adjusted     Long Term Goals: To be achieved in: 6 weeks  1. Disability index score of 30% or less for the LEFS to assist with reaching prior level of function. []? Progressing: []? Met: []? Not Met: []? Adjusted  3. Patient will demonstrate an increase in Strength to at least 4/5 in RLE as well as good proximal hip strength and control to allow for proper functional mobility as indicated by patients Functional Deficits. []? Progressing: []? Met: []? Not Met: []? Adjusted  4. Patient will return to functional activities including ambulating 3-4 blocks or around [facility] by patient's home without increased symptoms or restriction to resume PLOF   []? Progressing: []? Met: []? Not Met: []? Adjusted  5. Pt will reduce risk for falls by improving semi-tandem balance to 30 secs   []? Progressing: []? Met: []? Not Met: []? Adjusted         Overall Progression Towards Functional goals/ Treatment Progress Update:  [] Patient is progressing as expected towards functional goals listed. [] Progression is slowed due to complexities/Impairments listed. [] Progression has been slowed due to co-morbidities.   [x] Plan just implemented, too soon to assess goals progression <30days   [] Goals require adjustment due to lack of progress  [] Patient is not progressing as expected and requires additional follow up with physician  [] Other    Persisting Functional Limitations/Impairments:  []Sleeping []Sitting               [x]Standing [x]Transfers        [x]Walking []Kneeling               [x]Stairs [x]Squatting / bending   []ADLs [x]Reaching  [x]Lifting  [x]Housework  []Driving []Job related tasks  []Sports/Recreation []Other:        ASSESSMENT:  Did not do any resistance with SAQ due to increasing difficulty performing through full range. Still has decreased quad strength and Had increasing difficulty with performing sit to stands without excessive use of UE and compensatory strategies. Demonstrates with decreased hip strength and gait mechanics, with dependance on SPC to maintain upright consistently. Has a lot of weakness present in right quad with difficulty lifting into knee extension for anti-gravity positions. Continued focus on balance/proprioception and LE strengthening to improve fall risk and maximize functional mobility     Treatment/Activity Tolerance:  [x] Patient able to complete tx  [] Patient limited by fatigue  [] Patient limited by pain  [] Patient limited by other medical complications  [] Other:     Prognosis: [x] Good [] Fair  [] Poor    Patient Requires Follow-up: [x] Yes  [] No    Plan for next treatment session:  Balance, LE strengthening    PLAN: See kadie. PT 2x / week for 6 weeks. [x] Continue per plan of care [] Alter current plan (see comments)  [] Plan of care initiated [] Hold pending MD visit [] Discharge    Electronically signed by: Micha Edmond PT    Note: If patient does not return for scheduled/ recommended follow up visits, this note will serve as a discharge from care along with most recent update on progress.

## 2020-10-12 ENCOUNTER — HOSPITAL ENCOUNTER (OUTPATIENT)
Dept: PHYSICAL THERAPY | Age: 80
Setting detail: THERAPIES SERIES
Discharge: HOME | End: 2020-10-12
Payer: MEDICARE

## 2020-10-12 PROCEDURE — 97110 THERAPEUTIC EXERCISES: CPT

## 2020-10-12 PROCEDURE — 97112 NEUROMUSCULAR REEDUCATION: CPT

## 2020-10-12 NOTE — FLOWSHEET NOTE
[address] [facility] Physical Therapy  Phone: [phone]   Fax: [phone]    Physical Therapy Daily Treatment Note  Date:  10/12/2020    Patient Name:  Shayne Ruvalcaba    :  1940  MRN: [id number]  Medical/Treatment Diagnosis Information:  · Diagnosis: Neuropathy G62.9  · Treatment Diagnosis: Impaired balance, decreased ankle/hip/knee strength and stability, increased fall risk  Insurance/Certification information:  PT Insurance Information: Medicare  Physician Information:  Referring Practitioner: James Aguilar  Plan of care signed (Y/N): []  Yes [x]  No     Date of Patient follow up with Physician:      Progress Report: []  Yes  [x]  No     Date Range for reporting period:  Beginnin20  Ending:     Progress report due (10 Rx/or 30 days whichever is less): visit #90     Recertification due (POC duration/ or 90 days whichever is less): 10/16/20    Visit # Insurance Allowable Auth required? Date Range    [place] []  Yes  [x]  No      Latex Allergy:  [x]NO      []YES  Preferred Language for Healthcare:   [x]English       []other:    Functional Scale:        Date assessed:  LEFS: raw score = ; dysfunction =     Next visit:    Pain level:  0/10       SUBJECTIVE: Doing pretty good today, just takes him a while today to get up and moving.        OBJECTIVE: See eval       RESTRICTIONS/PRECAUTIONS:   FALL RISK     Exercises/Interventions:     Therapeutic Exercises (41085) Resistance / level Sets/sec Reps Notes   Scifit X 5 mins       HS Stretch     IB, HR/TR  2 X 10  30\" x 2   TR difficult d/t hurt right big toe           T.G. Squats     Added, CGA/min A on/off   Step-Ups Fwd           Supine SLR Flexion 1# R  2# L 2 X 10   Added, already doing as part of HEP    SAQ  2 X 10 10/1 Added   Clamshells  Added   Bridging   2  X 10     LAQ 2# L  1# R 2 X 10 B  Added, partial range for RLE    CKC TKE   Added          Cable Column        [employer] 4-way 9/28 Added                  Leg Press                      Therapeutic Activities (85469)              Lateral Band Walk Lime  X 4 laps  10/9 Added           Sit to stands from edge of mat    10/5 Attempted, unable, try at future visits           Partial Mini Squats  Lime green band distal quad 2 X 10 10/9 Added, mod verbal/tactile cues for form                         Neuromuscular Re-ed (06477)       Airex Tandem Balance   20\" x 2 9/22 Added   SLS Airex   20\" x 2 9/25 Added, occ UE support    Wobbleboard    X 20 Ant/Post  X 20 Med/Lat  60\" x 1 DLS    BOSU  Step-Ups    X 15 B 9/22 Added   10/5  Mild collapse RLE today   Hip Hikes, lateral (foot flat) 2\"  X 10 B 10/5 Added    Alt Toe taps standing on Airex  9/25 Added, unsupported    Shuttle Step-Up to SLS  Shuttle Hip Abduction    2 X 10 B  X 10 B Added 10/1           Biodex               Manual Intervention ([phone])                                                     Pt. Education:  -patient educated on diagnosis, prognosis and expectations for rehab  -all patient questions were answered    Home Exercise Program:  Pt has handout including ankle pumps, seated Hip flexion, SLR, mini squats he is currently doing given by home health PT  Discussed and demonstrated this date      Therapeutic Exercise and NMR EXR  [] (38870) Provided verbal/tactile cueing for activities related to strengthening, flexibility, endurance, ROM for improvements in  [] LE / Lumbar: LE, proximal hip, and core control with self care, mobility, lifting, ambulation.   [] UE / Cervical: cervical, postural, scapular, scapulothoracic and UE control with self care, reaching, carrying, lifting, house/yardwork, driving, computer work.  [] (03850) Provided verbal/tactile cueing for activities related to improving balance, coordination, kinesthetic sense, posture, motor skill, proprioception to assist with   [] LE / lumbar: LE, proximal hip, and core control in self care, mobility, lifting, ambulation and ROM of   [] LE / Lumbar: core, proximal hip and LE for functional self-care, mobility, lifting and ambulation/stair navigation   [] UE / Cervical: cervical, postural, scapular, scapulothoracic and UE control with self care, reaching, carrying, lifting, house/yardwork, driving, computer work  [] (02693)Reviewed/Progressed HEP activities related to improving balance, coordination, kinesthetic sense, posture, motor skill, proprioception of   [] LE: core, proximal hip and LE for self care, mobility, lifting, and ambulation/stair navigation    [] UE / Cervical: cervical, postural,  scapular, scapulothoracic and UE control with self care, reaching, carrying, lifting, house/yardwork, driving, computer work    Manual Treatments:  PROM / STM / Oscillations-Mobs:  G-I, II, III, IV (PA's, Inf., Post.)  [] (03775) Provided manual therapy to mobilize LE, proximal hip and/or LS spine soft tissue/joints for the purpose of modulating pain, promoting relaxation,  increasing ROM, reducing/eliminating soft tissue swelling/inflammation/restriction, improving soft tissue extensibility and allowing for proper ROM for normal function with   [] LE / lumbar: self care, mobility, lifting and ambulation. [] UE / Cervical: self care, reaching, carrying, lifting, house/yardwork, driving, computer work. Modalities:  [] (26430) Vasopneumatic compression: Utilized vasopneumatic compression to decrease edema / swelling for the purpose of improving mobility and quad tone / recruitment which will allow for increased overall function including but not limited to self-care, transfers, ambulation, and ascending / descending stairs.        Modalities:      Charges:  Timed Code Treatment Minutes: 44   Total Treatment Minutes: 44     [] EVAL - LOW (51479)   [] EVAL - MOD (86945)  [] EVAL - HIGH (90285)  [] RE-EVAL (72669)  [x] RA(74889) x 2       [] Ionto  [x] NMR (16612) x 1       [] Vaso  [] Manual (01420) x       [] Ultrasound  [] TA x 1       [] [facility] Traction (53232)  [] Aquatic Therapy x     [] ES (un) (74107):   [] Home Management Training x  [] ES(attended) (26536)   [] Dry Needling 1-2 muscles (44832):  [] Dry Needling 3+ muscles (979961)  [] Group:      [] Other:     GOALS: Patient stated goal: strengthen legs to do stuff, such as golfing if able   []? Progressing: []? Met: []? Not Met: []? Adjusted     Therapist goals for Patient:   Short Term Goals: To be achieved in: 2 weeks  1. Independent in HEP and progression per patient tolerance, in order to prevent re-injury. []? Progressing: []? Met: []? Not Met: []? Adjusted  2. Patient will have a decrease in pain to facilitate improvement in movement, function, and ADLs as indicated by Functional Deficits. []? Progressing: []? Met: []? Not Met: []? Adjusted     Long Term Goals: To be achieved in: 6 weeks  1. Disability index score of 30% or less for the LEFS to assist with reaching prior level of function. []? Progressing: []? Met: []? Not Met: []? Adjusted  3. Patient will demonstrate an increase in Strength to at least 4/5 in RLE as well as good proximal hip strength and control to allow for proper functional mobility as indicated by patients Functional Deficits. []? Progressing: []? Met: []? Not Met: []? Adjusted  4. Patient will return to functional activities including ambulating 3-4 blocks or around [facility] by patient's home without increased symptoms or restriction to resume PLOF   []? Progressing: []? Met: []? Not Met: []? Adjusted  5. Pt will reduce risk for falls by improving semi-tandem balance to 30 secs   []? Progressing: []? Met: []? Not Met: []? Adjusted         Overall Progression Towards Functional goals/ Treatment Progress Update:  [] Patient is progressing as expected towards functional goals listed. [] Progression is slowed due to complexities/Impairments listed. [] Progression has been slowed due to co-morbidities.   [x] Plan just implemented, too soon to assess goals progression <30days   [] Goals require adjustment due to lack of progress  [] Patient is not progressing as expected and requires additional follow up with physician  [] Other    Persisting Functional Limitations/Impairments:  []Sleeping []Sitting               [x]Standing [x]Transfers        [x]Walking []Kneeling               [x]Stairs [x]Squatting / bending   []ADLs [x]Reaching  [x]Lifting  [x]Housework  []Driving []Job related tasks  []Sports/Recreation []Other:        ASSESSMENT:  Did not do any resistance with SAQ due to increasing difficulty performing through full range. Still has decreased quad strength and Had increasing difficulty with performing sit to stands without excessive use of UE and compensatory strategies. Demonstrates with decreased hip strength and gait mechanics, with dependance on SPC to maintain upright consistently. Has a lot of weakness present in right quad with difficulty lifting into knee extension for anti-gravity positions. Continued focus on balance/proprioception and LE strengthening to improve fall risk and maximize functional mobility     Treatment/Activity Tolerance:  [x] Patient able to complete tx  [] Patient limited by fatigue  [] Patient limited by pain  [] Patient limited by other medical complications  [] Other:     Prognosis: [x] Good [] Fair  [] Poor    Patient Requires Follow-up: [x] Yes  [] No    Plan for next treatment session:  Balance, LE strengthening    PLAN: See kadie. PT 2x / week for 6 weeks. [x] Continue per plan of care [] Alter current plan (see comments)  [] Plan of care initiated [] Hold pending MD visit [] Discharge    Electronically signed by: Brandyn Richards PT    Note: If patient does not return for scheduled/ recommended follow up visits, this note will serve as a discharge from care along with most recent update on progress.

## 2020-10-13 ENCOUNTER — OFFICE VISIT (OUTPATIENT)
Dept: ORTHOPEDIC SURGERY | Age: 80
End: 2020-10-13
Payer: MEDICARE

## 2020-10-13 VITALS — TEMPERATURE: 97.2 F | BODY MASS INDEX: 26.05 KG/M2 | WEIGHT: 182 LBS | HEIGHT: 70 IN

## 2020-10-13 PROBLEM — M65.312 TRIGGER THUMB OF LEFT HAND: Status: ACTIVE | Noted: 2020-10-13

## 2020-10-13 PROBLEM — G56.03 BILATERAL CARPAL TUNNEL SYNDROME: Status: ACTIVE | Noted: 2020-10-13

## 2020-10-13 PROCEDURE — G8417 CALC BMI ABV UP PARAM F/U: HCPCS | Performed by: ORTHOPAEDIC SURGERY

## 2020-10-13 PROCEDURE — 99214 OFFICE O/P EST MOD 30 MIN: CPT | Performed by: ORTHOPAEDIC SURGERY

## 2020-10-13 PROCEDURE — L3908 WHO COCK-UP NONMOLDE PRE OTS: HCPCS | Performed by: ORTHOPAEDIC SURGERY

## 2020-10-13 PROCEDURE — G8427 DOCREV CUR MEDS BY ELIG CLIN: HCPCS | Performed by: ORTHOPAEDIC SURGERY

## 2020-10-13 PROCEDURE — 4040F PNEUMOC VAC/ADMIN/RCVD: CPT | Performed by: ORTHOPAEDIC SURGERY

## 2020-10-13 PROCEDURE — G8484 FLU IMMUNIZE NO ADMIN: HCPCS | Performed by: ORTHOPAEDIC SURGERY

## 2020-10-13 PROCEDURE — 1123F ACP DISCUSS/DSCN MKR DOCD: CPT | Performed by: ORTHOPAEDIC SURGERY

## 2020-10-13 PROCEDURE — 1036F TOBACCO NON-USER: CPT | Performed by: ORTHOPAEDIC SURGERY

## 2020-10-13 NOTE — PROGRESS NOTES
CHIEF COMPLAINT:   1- Bilateral R>L knee weakness and pain/ extension lag bilateral knees/ neuropathy. 2- Bilateral hand numbness/ neuropathy, CTS. 3- Left thumb sticking/ trigger thumb-new     HISTORY:  Mr. Haritha Baldwin 78 y.o.  male well known to me presents today for f/u evaluation of bilateral knee extension lag R>L which started over 10 years ago.  He is still complaining of dull pain 4/10. Pain is increase with standing and walking and decrease with rest. He feels his legs weak and knees wing sometimes, dull achy pain by the end of the day. Alleviating factors: rest. No radiation and no numbness and tingling sensation. No other complaint. No h/o trauma or gout. The patient is known to me for right lateral ankle ATFL ankle sprain. He also c/o bilateral hand numbness that has been worsening over the past few years and worsening. Pain wakes him at night. His entire hand falls asleep and he can feel it all the way up to bilateral elbows. Both of his hands and elbows are about the same with not one worse than the other. He works a lot with his hands. He also states that his left thumb gets stuck down near his palm and he has to pull it back into position. This is been worsening over the past few weeks. He denies any treatment for this problem. At his last visit he was sent for an EMG of his bilateral upper extremities and he is here today to review that. Denies smoking.       DATE OF INJURY: July 2020     Past Medical History:   Diagnosis Date    Anxiety     Arthritis     Diabetes mellitus ([address]     High blood pressure     [place] (hard of hearing)     Hyperlipidemia     Irregular heart beat     Wears glasses     Wears hearing aid in both ears     Wears partial dentures        Past Surgical History:   Procedure Laterality Date    COLONOSCOPY      CYST REMOVAL Left 5/21/15    FOOT SURGERY Left     cyst removed from foot    JOINT REPLACEMENT Right     shoulder    LUMBAR SPINE SURGERY Bilateral 2019    BILATERAL L4 TRANSFORAMINAL EPIDURAL STEROID INJECTION WITH FLUOROSCOPY performed by Yeimi Ahn MD at [address] [place] [address] Bilateral 2019    BILATERAL L4 TRANSFORAMINAL EPIDURAL STEROID INJECTION WITH FLUOROSCOPY performed by Yeimi Ahn MD at [address] [place] [address] N/A 10/14/2019    MICROLUMBAR LAMINECTOMY L4-5, 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, performed by Ayla Alston MD at [address] [place] [address]      tumor removed from neck    SHOULDER ARTHROPLASTY Right 2/17/15    SHOULDER SURGERY Bilateral     WRIST SURGERY         Social History     Socioeconomic History    Marital status:      Spouse name: Not on file    Number of children: 3    Years of education: Not on file    Highest education level: Not on file   Occupational History    Occupation: Retired   Social Needs    Financial resource strain: Not on file    Food insecurity     Worry: Not on file     Inability: Not on file   [employer] needs     Medical: Not on file     Non-medical: Not on file   Tobacco Use    Smoking status: Former Smoker     Last attempt to quit: 1975     Years since quittin.4    Smokeless tobacco: Never Used   Substance and Sexual Activity    Alcohol use: No    Drug use: No    Sexual activity: Not Currently   Lifestyle    Physical activity     Days per week: Not on file     Minutes per session: Not on file    Stress: Not on file   Relationships    Social connections     Talks on phone: Not on file     Gets together: Not on file     Attends Latter day service: Not on file     Active member of club or organization: Not on file     Attends meetings of clubs or organizations: Not on file     Relationship status: Not on file    Intimate partner violence     Fear of current or ex partner: Not on file     Emotionally abused: Not on file     Physically abused: Not on file     Forced sexual activity: Not on file   Other Topics Concern    Not on file   Social History Narrative    Not on file       Family History   Problem Relation Age of Onset    No Known Problems Mother     No Known Problems Father     Heart Disease Brother     Heart Disease Brother        Current Outpatient Medications on File Prior to Visit   Medication Sig Dispense Refill    rosuvastatin (CRESTOR) 20 MG tablet Take 20 mg by mouth every other day      empagliflozin (JARDIANCE) 10 MG tablet Take 10 mg by mouth daily      pioglitazone (ACTOS) 30 MG tablet Take 30 mg by mouth daily      atorvastatin (LIPITOR) 80 MG tablet Take 80 mg by mouth nightly      Multiple Vitamins-Minerals (MULTIVITAMIN ADULT PO) Take 1 tablet by mouth daily      zolpidem (AMBIEN) 10 MG tablet Take by mouth nightly.  ibuprofen (ADVIL;MOTRIN) 200 MG tablet Take 200 mg by mouth every 6 hours as needed for Pain      metFORMIN (GLUCOPHAGE) 1000 MG tablet Take 1,000 mg by mouth 2 times daily (with meals)      valsartan-hydrochlorothiazide (DIOVAN-HCT) 160-12.5 MG per tablet Take 1 tablet by mouth daily.  glipiZIDE (GLUCOTROL) 5 MG tablet Take 5 mg by mouth.  sertraline (ZOLOFT) 25 MG tablet Take 25 mg by mouth nightly.  Omega-3 Fatty Acids (FISH OIL) 1000 MG CPDR   Take 1,000 mg by mouth 2 times daily       aspirin 81 MG tablet Take 81 mg by mouth daily.  naproxen (NAPROSYN) 500 MG tablet Take 1 tablet by mouth 2 times daily (with meals) for 14 days 28 tablet 0     No current facility-administered medications on file prior to visit. Pertinent items are noted in HPI  Review of systems reviewed from Patient History Form dated on 8/12/2020 and available in the patient's chart under the Media tab. No change. PHYSICAL EXAMINATION:  Mr. Lizabeth Mtz is a very pleasant 78 y.o.  male who presents today in no acute distress, awake, alert, and oriented. He is well dressed, nourished and  groomed. Patient with normal affect.   Height is  5' 10\" (1.778 m), weight is 182 lb (82.6 kg), Body mass index is 26.11 kg/m². Resting respiratory rate is 16. Examination of the gait, showed that the patient walks heel-toe with a leg dragging type gait and a limp.  Examination of both knees showing decrease active extension ROM, with about 15 degree lag bilateral, but full passive extension, mild crepitus, tenderness on medial joint line, stable to varus and valgus stress. He has intact sensation and good pedal pulses. He has good strength in 2 planes, and has mild tenderness on deep palpation over the medial joint line. Knee reflex 1+ bilaterally. Quad and patellar tendon are intact bilateral.    Examination for Carpal Tunnel Syndrome shows Carpal Tunnel Compression Test to be moderately positive on the right & moderately positive on the left. Phalen's Maneuver is moderately positive on the right & moderately positive on the left. The patient displays no baseline symptoms to potentially confound the exam.  The thenar musculature is not atrophied & weakened. IMAGING:  Xray 3 views of the bilaterally knee was obtained 8/28/2020 in the office and reviewed. These demonstrate mild degenerative changes with narrowing of the joint space in the medial joint space compartment, subchondral sclerosis, and marginal osteophytosis. EMG bilateral LE dated 9/8/2020 was reviewed and showed Study is consistent with mild sensorimotor peripheral neuropathy most likely secondary to diabetes. Decreased insertional activity in right lumbar paraspinals most likely secondary to prior surgery. Decreased voluntary motor units of right vastus medialis is of uncertain significance, may be related to prior lumbar surgery or to the peripheral neuropathy. No evidence of an acute radiculopathy at this time. EMG dated 9/30/2020 the bilateral upper extremities were reviewed and showed moderately severe bilateral median nerve lesions at the wrist, carpal tunnel syndrome.   Right side is slightly worse than the left. Bilateral ulnar nerve lesions at the elbow, the left side is more severely involved compared to the right. IMPRESSION:    1- Bilateral R>L knee weakness and pain/ extension lag bilateral knees/ neuropathy. 2- Bilateral hand numbness/ neuropathy, CTS. 3- Left thumb sticking/ trigger thumb-new       PLAN:  I discussed with the patient the EMG findings and the treatment options including both surgical and non-surgical treatment. We recommended Quad exercises and stretching of the calf and hamstrings which was taught to the patient today. Referral to neurologist for his neuropathy. We discussed with him options of bracing, immobilization, cortisone injections or surgery for the carpal tunnel syndrome. The patient would like to proceed with bracing which were applied to bilateral wrist and instructed in care. Follow-up in 6 weeks and will consider cortisone injection or proceeding with surgery as needed.       Heddy Sacks, MD

## 2020-10-16 ENCOUNTER — HOSPITAL ENCOUNTER (OUTPATIENT)
Dept: PHYSICAL THERAPY | Age: 80
Setting detail: THERAPIES SERIES
Discharge: HOME | End: 2020-10-16
Payer: MEDICARE

## 2020-10-16 PROCEDURE — 97112 NEUROMUSCULAR REEDUCATION: CPT

## 2020-10-16 PROCEDURE — 97110 THERAPEUTIC EXERCISES: CPT

## 2020-10-16 NOTE — FLOWSHEET NOTE
2  9/22 TR difficult d/t hurt right big toe           T.G. Squats    9/28 Added, CGA/min A on/off   Step-Ups Fwd           Supine SLR Flexion 1# R  2# L 2 X 10  9/25 Added, already doing as part of HEP    SAQ  2 X 10 10/1 Added   Clamshells 9/22 Added   Bridging   2  X 10     LAQ 2# L  1# R 2 X 10 B 9/22 Added, partial range for RLE    CKC TKE  9/25 Added          Cable Column        Cable Walkouts 4-way  2.5 plates  X 3 each 1/16 Added                  Leg Press                      Therapeutic Activities (88343)              Lateral Band Walk Lime  X 4 laps  10/9 Added           Sit to stands from edge of mat   X 8 10/5 Attempted, unable, try at future visits   10/16 with PT assist          Partial Mini Squats  Lime green band distal quad 2 X 10 10/9 Added, mod verbal/tactile cues for form           Gait training on curb/ramps, uneven terrain               Neuromuscular Re-ed (60247)       Airex Tandem Balance   20\" x 2 9/22 Added   SLS Airex   20\" x 2 9/25 Added, occ UE support    Wobbleboard    X 20 Ant/Post  X 20 Med/Lat  60\" x 1 DLS    BOSU  Step-Ups    X 15 B 9/22 Added   10/5  Mild collapse RLE today   Hip Hikes, lateral (foot flat) 2\"  X 10 B 10/5 Added    Alt Toe taps standing on Airex  9/25 Added, unsupported    Shuttle Step-Up to [employer]  Shuttle Hip Abduction  Shuttle Marching     2 X 10 B  X 10 B Added 10/1           Biodex               1/2 wedge DF, PF static standing                             Manual Intervention ([phone])                                                     Pt. Education:  -patient educated on diagnosis, prognosis and expectations for rehab  -all patient questions were answered    Home Exercise Program:  Pt has handout including ankle pumps, seated Hip flexion, SLR, mini squats he is currently doing given by home health PT  Discussed and demonstrated this date      Therapeutic Exercise and NMR EXR  [] (89620) Provided verbal/tactile cueing for activities related to strengthening, flexibility, endurance, ROM for improvements in  [] LE / Lumbar: LE, proximal hip, and core control with self care, mobility, lifting, ambulation. [] UE / Cervical: cervical, postural, scapular, scapulothoracic and UE control with self care, reaching, carrying, lifting, house/yardwork, driving, computer work.  [] (90115) Provided verbal/tactile cueing for activities related to improving balance, coordination, kinesthetic sense, posture, motor skill, proprioception to assist with   [] LE / lumbar: LE, proximal hip, and core control in self care, mobility, lifting, ambulation and eccentric single leg control. [] UE / cervical: cervical, scapular, scapulothoracic and UE control with self care, reaching, carrying, lifting, house/yardwork, driving, computer work.   [] (90156) Therapist is in constant attendance of 2 or more patients providing skilled therapy interventions, but not providing any significant amount of measurable one-on-one time to either patient, for improvements in  [] LE / lumbar: LE, proximal hip, and core control in self care, mobility, lifting, ambulation and eccentric single leg control. [] UE / cervical: cervical, scapular, scapulothoracic and UE control with self care, reaching, carrying, lifting, house/yardwork, driving, computer work.      NMR and Therapeutic Activities:    [] (39171 or 92590) Provided verbal/tactile cueing for activities related to improving balance, coordination, kinesthetic sense, posture, motor skill, proprioception and motor activation to allow for proper function of   [] LE: / Lumbar core, proximal hip and LE with self care and ADLs  [] UE / Cervical: cervical, postural, scapular, scapulothoracic and UE control with self care, carrying, lifting, driving, computer work.   [] (38887) Gait Re-education- Provided training and instruction to the patient for proper LE, core and proximal hip recruitment and positioning and eccentric body weight control with ambulation re-education including up and down stairs     Home Management Training / Self Care:  [] (19672) Provided self-care/home management training related to activities of daily living and compensatory training, and/or use of adaptive equipment for improvement with: ADLs and compensatory training, meal preparation, safety procedures and instruction in use of adaptive equipment, including bathing, grooming, dressing, personal hygiene, basic household cleaning and chores. Home Exercise Program:    [x] (85932) Reviewed/Progressed HEP activities related to strengthening, flexibility, endurance, ROM of   [] LE / Lumbar: core, proximal hip and LE for functional self-care, mobility, lifting and ambulation/stair navigation   [] UE / Cervical: cervical, postural, scapular, scapulothoracic and UE control with self care, reaching, carrying, lifting, house/yardwork, driving, computer work  [] (27156)Reviewed/Progressed HEP activities related to improving balance, coordination, kinesthetic sense, posture, motor skill, proprioception of   [] LE: core, proximal hip and LE for self care, mobility, lifting, and ambulation/stair navigation    [] UE / Cervical: cervical, postural,  scapular, scapulothoracic and UE control with self care, reaching, carrying, lifting, house/yardwork, driving, computer work    Manual Treatments:  PROM / STM / Oscillations-Mobs:  G-I, II, III, IV (PA's, Inf., Post.)  [] (34649) Provided manual therapy to mobilize LE, proximal hip and/or LS spine soft tissue/joints for the purpose of modulating pain, promoting relaxation,  increasing ROM, reducing/eliminating soft tissue swelling/inflammation/restriction, improving soft tissue extensibility and allowing for proper ROM for normal function with   [] LE / lumbar: self care, mobility, lifting and ambulation. [] UE / Cervical: self care, reaching, carrying, lifting, house/yardwork, driving, computer work.      Modalities:  [] (65937) Vasopneumatic compression: Utilized vasopneumatic compression to decrease edema / swelling for the purpose of improving mobility and quad tone / recruitment which will allow for increased overall function including but not limited to self-care, transfers, ambulation, and ascending / descending stairs. Modalities:      Charges:  Timed Code Treatment Minutes: 46   Total Treatment Minutes: 46     [] EVAL - LOW (39775)   [] EVAL - MOD (97797)  [] EVAL - HIGH (08489)  [] RE-EVAL (15695)  [x] LT(13665) x 2       [] Ionto  [x] NMR (87987) x 1       [] Vaso  [] Manual (54727) x       [] Ultrasound  [] TA x 1       [] Mech Traction (76284)  [] Aquatic Therapy x      [] ES (un) (77113):   [] Home Management Training x  [] ES(attended) (47687)   [] Dry Needling 1-2 muscles (36045):  [] Dry Needling 3+ muscles (216984)  [] Group:      [] Other:     GOALS: Patient stated goal: strengthen legs to do stuff, such as golfing if able   []? Progressing: []? Met: []? Not Met: []? Adjusted     Therapist goals for Patient:   Short Term Goals: To be achieved in: 2 weeks  1. Independent in HEP and progression per patient tolerance, in order to prevent re-injury. []? Progressing: []? Met: []? Not Met: []? Adjusted  2. Patient will have a decrease in pain to facilitate improvement in movement, function, and ADLs as indicated by Functional Deficits. []? Progressing: []? Met: []? Not Met: []? Adjusted     Long Term Goals: To be achieved in: 6 weeks  1. Disability index score of 30% or less for the LEFS to assist with reaching prior level of function. []? Progressing: []? Met: []? Not Met: []? Adjusted  3. Patient will demonstrate an increase in Strength to at least 4/5 in RLE as well as good proximal hip strength and control to allow for proper functional mobility as indicated by patients Functional Deficits. []? Progressing: []? Met: []? Not Met: []? Adjusted  4.  Patient will return to functional activities including ambulating 3-4 blocks or around [place] by patient's home without increased symptoms or restriction to resume PLOF   []? Progressing: []? Met: []? Not Met: []? Adjusted  5. Pt will reduce risk for falls by improving semi-tandem balance to 30 secs   []? Progressing: []? Met: []? Not Met: []? Adjusted         Overall Progression Towards Functional goals/ Treatment Progress Update:  [] Patient is progressing as expected towards functional goals listed. [] Progression is slowed due to complexities/Impairments listed. [] Progression has been slowed due to co-morbidities. [x] Plan just implemented, too soon to assess goals progression <30days   [] Goals require adjustment due to lack of progress  [] Patient is not progressing as expected and requires additional follow up with physician  [] Other    Persisting Functional Limitations/Impairments:  []Sleeping []Sitting               [x]Standing [x]Transfers        [x]Walking []Kneeling               [x]Stairs [x]Squatting / bending   []ADLs [x]Reaching  [x]Lifting  [x]Housework  []Driving []Job related tasks  []Sports/Recreation []Other:        ASSESSMENT:  Did not do any resistance with SAQ due to increasing difficulty performing through full range. Still has decreased quad strength and Had increasing difficulty with performing sit to stands without excessive use of UE and compensatory strategies. Demonstrates with decreased hip strength and gait mechanics, with dependance on SPC to maintain upright consistently. Has a lot of weakness present in right quad with difficulty lifting into knee extension for anti-gravity positions.   Continued focus on balance/proprioception and LE strengthening to improve fall risk and maximize functional mobility     Treatment/Activity Tolerance:  [x] Patient able to complete tx  [] Patient limited by fatigue  [] Patient limited by pain  [] Patient limited by other medical complications  [] Other:     Prognosis: [x] Good [] Fair  [] Poor    Patient Requires

## 2020-10-19 ENCOUNTER — HOSPITAL ENCOUNTER (OUTPATIENT)
Dept: PHYSICAL THERAPY | Age: 80
Setting detail: THERAPIES SERIES
Discharge: HOME | End: 2020-10-19
Payer: MEDICARE

## 2020-10-19 PROCEDURE — 97530 THERAPEUTIC ACTIVITIES: CPT

## 2020-10-19 PROCEDURE — 97140 MANUAL THERAPY 1/> REGIONS: CPT

## 2020-10-19 PROCEDURE — 97110 THERAPEUTIC EXERCISES: CPT

## 2020-10-19 NOTE — FLOWSHEET NOTE
[facility]  Phone: [phone]   Fax: [phone]     Physical Therapy Re-Certification Plan of Care/Progress Report     Dear Referring Practitioner: Twin Barnett,    We had the pleasure of treating the following patient for physical therapy services at [address] [place] [address]. A summary of our findings can be found in the updated assessment below. This includes our plan of care. If you have any questions or concerns regarding these findings, please do not hesitate to contact me at the office phone number checked above.   Thank you for the referral.     Physician Signature:________________________________Date:__________________  By signing above (or electronic signature), therapists plan is approved by physician      Functional Outcome:      PROM AROM     L R L R   Hip Flexion           Hip Abduction           Hip ER           Hip IR           Knee Flexion WNL WNL       Knee Extension WNL WNL Limited by strength Limited by strength    Dorsiflexion            Plantarflexion            Inversion            Eversion                  Strength (0-5) / Myotomes Left Right   Hip Flexion - supine       Hip Flexion - seated (L1-2) 4+ 4-   Hip Abduction 4- 3+   Hip Adduction       Hip ER 4+ 4+   Hip IR 4+ 4+   Quads (L2-4) 4- 3-   Hamstrings 5 5   Ankle Dorsiflexion (L4-5) 4- 4   Ankle Plantarflexion (S1-2)       Ankle Inversion       Ankle Eversion (S1-2)       Great Toe Extension (L5)               Flexibility       Hamstrings (90/90) WFL WFL         Overall Response to Treatment:   [x]Patient is responding well to treatment and improvement is noted with regards  to goals   []Patient should continue to improve in reasonable time if they continue HEP   []Patient has plateaued and is no longer responding to skilled PT intervention    []Patient is getting worse and would benefit from return to referring MD   []Patient unable to adhere to initial POC   []Other:      Date range of Visits:    Total Visits: 10    Recommendation:    [x]Continue PT 2x / wk for 4-6 weeks. []Hold PT, pending MD visit      Physical Therapy Daily Treatment Note  Date:  10/19/2020    Patient Name:  Carmine Rubi    :  1940  MRN: [id number]  Medical/Treatment Diagnosis Information:  · Diagnosis: Neuropathy G62.9  · Treatment Diagnosis: Impaired balance, decreased ankle/hip/knee strength and stability, increased fall risk  Insurance/Certification information:  PT Insurance Information: Medicare  Physician Information:  Referring Practitioner: Dane Viera  Plan of care signed (Y/N): []  Yes [x]  No     Date of Patient follow up with Physician:      Progress Report: []  Yes  [x]  No     Date Range for reporting period:  Beginnin20  Ending:     Progress report due (10 Rx/or 30 days whichever is less): visit #60     Recertification due (POC duration/ or 90 days whichever is less): 10/16/20    Visit # Insurance Allowable Auth required? Date Range   10/12 +   0/8-12  [place] []  Yes  [x]  No      Latex Allergy:  [x]NO      []YES  Preferred Language for Healthcare:   [x]English       []other:    Functional Scale:        Date assessed:  LEFS: raw score = ; dysfunction =     Next visit:    Pain level:  5/10  Medial portion of left knee. \"Throbbing toothache. \"      SUBJECTIVE:   Francisco Javier Cuevas his improvements is day to day. Today really bothering him d/t the rainy weather. Pt states that the therapy has helped some, but still has trouble going down even small grades and then doing sit to stands, which he feels is his biggest concern. Only problems with regards to pain is on the spot on the left knee. Said he can't sit anywhere too long and then get up. OBJECTIVE:     Balance:  Semi-tandem: 30 secs bilaterally; Tandem:  20 secs L, 30 secs left.        PROM AROM     L R L R   Hip Flexion           Hip Abduction           Hip ER           Hip IR           Knee Flexion WNL WNL     Knee Extension WNL WNL Limited by strength Limited by strength    Dorsiflexion            Plantarflexion            Inversion            Eversion                  Strength (0-5) / Myotomes Left Right   Hip Flexion - supine       Hip Flexion - seated (L1-2) 4+ 4-   Hip Abduction 4- 3+   Hip Adduction       Hip ER 4+ 4+   Hip IR 4 4   Quads (L2-4) 3+ 3-   Hamstrings 5 5   Ankle Dorsiflexion (L4-5) 4- 4+   Ankle Plantarflexion (S1-2)       Ankle Inversion       Ankle Eversion (S1-2)       Great Toe Extension (L5)               Flexibility       Hamstrings (90/90) WFL WFL     RESTRICTIONS/PRECAUTIONS:   FALL RISK     Exercises/Interventions:     Therapeutic Exercises (65677) Resistance / level Sets/sec Reps Notes   Scifit X 5 mins       HS Stretch     IB, HR/TR  2 X 10  30\" x 2  9/22 TR difficult d/t hurt right big toe           T.G. Squats    9/28 Added, CGA/min A on/off   Step-Ups Fwd 4\"  X 10 B d   Step-Downs  4\"  X 10 B 10/19 Added, with difficulty, use of // bars    Supine SLR Flexion 1# R  2# L 2 X 10  9/25 Added, already doing as part of HEP    SAQ  2 X 10 10/1 Added   Clamshells 9/22 Added   Bridging   2  X 10     LAQ 2# L  1# R 2 X 10 B 9/22 Added, partial range for RLE    CKC TKE  9/25 Added          Cable Column        Cable Walkouts 4-way  2.5 plates  X 3 each 9/83 Added                  Leg Press                      Therapeutic Activities (06664)              Lateral Band Walk Lime  X 4 laps  10/9 Added           Sit to stands from edge of mat   X 8 10/5 Attempted, unable, try at future visits   10/16 with PT assist          Partial Mini Squats  Lime green band distal quad 2 X 10 10/9 Added, mod verbal/tactile cues for form           Gait training on curb/ramps, uneven terrain               Neuromuscular Re-ed (49834)       Airex Tandem Balance   20\" x 2 9/22 Added   SLS Airex   20\" x 2 9/25 Added, occ UE support    Wobbleboard    X 20 Ant/Post  X 20 Med/Lat  60\" x 1 DLS    BOSU  Step-Ups    X 15 B 9/22 Added   10/5  Mild collapse RLE today   Hip Hikes, lateral (foot flat) 2\"  X 10 B 10/5 Added    Alt Toe taps standing on Airex  9/25 Added, unsupported    Shuttle Step-Up to [employer]  Shuttle Hip Abduction  Shuttle Marching     2 X 10 B  X 10 B  X 20 B Added 10/1          Biodex               1/2 wedge DF, PF static standing   60\" X 2 B, each 10/19 Added                         Manual Intervention (19515)                                                     Pt. Education:  -patient educated on diagnosis, prognosis and expectations for rehab  -all patient questions were answered    Home Exercise Program:  Pt has handout including ankle pumps, seated Hip flexion, SLR, mini squats he is currently doing given by home health PT  Discussed and demonstrated this date      Therapeutic Exercise and NMR EXR  [] (12642) Provided verbal/tactile cueing for activities related to strengthening, flexibility, endurance, ROM for improvements in  [] LE / Lumbar: LE, proximal hip, and core control with self care, mobility, lifting, ambulation. [] UE / Cervical: cervical, postural, scapular, scapulothoracic and UE control with self care, reaching, carrying, lifting, house/yardwork, driving, computer work.  [] (03237) Provided verbal/tactile cueing for activities related to improving balance, coordination, kinesthetic sense, posture, motor skill, proprioception to assist with   [] LE / lumbar: LE, proximal hip, and core control in self care, mobility, lifting, ambulation and eccentric single leg control.    [] UE / cervical: cervical, scapular, scapulothoracic and UE control with self care, reaching, carrying, lifting, house/yardwork, driving, computer work.   [] (28675) Therapist is in constant attendance of 2 or more patients providing skilled therapy interventions, but not providing any significant amount of measurable one-on-one time to either patient, for improvements in  [] LE / lumbar: LE, proximal hip, and core control in self care, mobility, lifting, ambulation and eccentric single leg control. [] UE / cervical: cervical, scapular, scapulothoracic and UE control with self care, reaching, carrying, lifting, house/yardwork, driving, computer work. NMR and Therapeutic Activities:    [] (38245 or 18623) Provided verbal/tactile cueing for activities related to improving balance, coordination, kinesthetic sense, posture, motor skill, proprioception and motor activation to allow for proper function of   [] LE: / Lumbar core, proximal hip and LE with self care and ADLs  [] UE / Cervical: cervical, postural, scapular, scapulothoracic and UE control with self care, carrying, lifting, driving, computer work.   [] (00115) Gait Re-education- Provided training and instruction to the patient for proper LE, core and proximal hip recruitment and positioning and eccentric body weight control with ambulation re-education including up and down stairs     Home Management Training / Self Care:  [] (07947) Provided self-care/home management training related to activities of daily living and compensatory training, and/or use of adaptive equipment for improvement with: ADLs and compensatory training, meal preparation, safety procedures and instruction in use of adaptive equipment, including bathing, grooming, dressing, personal hygiene, basic household cleaning and chores.      Home Exercise Program:    [x] (01477) Reviewed/Progressed HEP activities related to strengthening, flexibility, endurance, ROM of   [] LE / Lumbar: core, proximal hip and LE for functional self-care, mobility, lifting and ambulation/stair navigation   [] UE / Cervical: cervical, postural, scapular, scapulothoracic and UE control with self care, reaching, carrying, lifting, house/yardwork, driving, computer work  [] (76099)Reviewed/Progressed HEP activities related to improving balance, coordination, kinesthetic sense, posture, motor skill, proprioception of   [] LE: core, proximal hip and LE for self care, mobility, lifting, and ambulation/stair navigation    [] UE / Cervical: cervical, postural,  scapular, scapulothoracic and UE control with self care, reaching, carrying, lifting, house/yardwork, driving, computer work    Manual Treatments:  PROM / STM / Oscillations-Mobs:  G-I, II, III, IV (PA's, Inf., Post.)  [] (21597) Provided manual therapy to mobilize LE, proximal hip and/or LS spine soft tissue/joints for the purpose of modulating pain, promoting relaxation,  increasing ROM, reducing/eliminating soft tissue swelling/inflammation/restriction, improving soft tissue extensibility and allowing for proper ROM for normal function with   [] LE / lumbar: self care, mobility, lifting and ambulation. [] UE / Cervical: self care, reaching, carrying, lifting, house/yardwork, driving, computer work. Modalities:  [] (70263) Vasopneumatic compression: Utilized vasopneumatic compression to decrease edema / swelling for the purpose of improving mobility and quad tone / recruitment which will allow for increased overall function including but not limited to self-care, transfers, ambulation, and ascending / descending stairs. Modalities:      Charges:  Timed Code Treatment Minutes: 46   Total Treatment Minutes: 46     [] EVAL - LOW (16751)   [] EVAL - MOD (01470)  [] EVAL - HIGH (76304)  [] RE-EVAL (27898)  [x] BP(85209) x 1      [] Ionto  [x] NMR (24412) x 1       [] Vaso  [] Manual (97924) x       [] Ultrasound  [x] TA x 1       [] Mech Traction (77465)  [] Aquatic Therapy x      [] ES (un) (69585):   [] Home Management Training x  [] ES(attended) (88287)   [] Dry Needling 1-2 muscles (09153):  [] Dry Needling 3+ muscles (177361)  [] Group:      [] Other:     GOALS: Patient stated goal: strengthen legs to do stuff, such as golfing if able   [x]? Progressing: []? Met: []? Not Met: []? Adjusted     Therapist goals for Patient:   Short Term Goals: To be achieved in: 2 weeks  1.  Independent in HEP and progression per patient tolerance, in order to prevent re-injury. [x]? Progressing: []? Met: []? Not Met: []? Adjusted  2. Patient will have a decrease in pain to facilitate improvement in movement, function, and ADLs as indicated by Functional Deficits. [x]? Progressing: []? Met: []? Not Met: []? Adjusted     Long Term Goals: To be achieved in: 6 weeks  1. Disability index score of 30% or less for the LEFS to assist with reaching prior level of function. [x]? Progressing: []? Met: []? Not Met: []? Adjusted  3. Patient will demonstrate an increase in Strength to at least 4/5 in RLE as well as good proximal hip strength and control to allow for proper functional mobility as indicated by patients Functional Deficits. [x]? Progressing: []? Met: []? Not Met: []? Adjusted  4. Patient will return to functional activities including ambulating 3-4 blocks or around [facility] by patient's home without increased symptoms or restriction to resume PLOF--Said he hasn't tried to walk around the mall yet    [x]? Progressing: []? Met: []? Not Met: []? Adjusted  5. Pt will reduce risk for falls by improving semi-tandem balance to 30 secs/ NEW GOAL:   Pt will improve tandem balance to 30 secs and/or perform 30 sec sit to stand test with good mechanics to help reduce risk for falls   []? Progressing: [x]? Met: []? Not Met: [x]? Adjusted         Overall Progression Towards Functional goals/ Treatment Progress Update:  [x] Patient is progressing as expected towards functional goals listed. [] Progression is slowed due to complexities/Impairments listed. [] Progression has been slowed due to co-morbidities.   [] Plan just implemented, too soon to assess goals progression <30days   [] Goals require adjustment due to lack of progress  [] Patient is not progressing as expected and requires additional follow up with physician  [] Other    Persisting Functional Limitations/Impairments:  []Sleeping []Sitting               [x]Standing [x]Transfers        [x]Walking []Kneeling               [x]Stairs [x]Squatting / bending   []ADLs [x]Reaching  [x]Lifting  [x]Housework  []Driving []Job related tasks  []Sports/Recreation []Other:        ASSESSMENT:  Pt has received 10 skilled PT visits to work on LE strength, balance and stability to help with improving functional activity tolerance and reducing risk for falls. Still has decreased quad strength and Had increasing difficulty with performing sit to stands without excessive use of UE and compensatory strategies. Demonstrates with decreased hip strength and gait mechanics, with dependance on SPC to maintain upright consistently. Has a lot of weakness present in right quad with difficulty lifting into knee extension for anti-gravity positions. Has significant difficulties with sit to stand transfers, but has improved with his balance and hip strength. Continued focus on balance/proprioception and LE strengthening to improve fall risk and maximize functional mobility     Treatment/Activity Tolerance:  [x] Patient able to complete tx  [] Patient limited by fatigue  [] Patient limited by pain  [] Patient limited by other medical complications  [] Other:     Prognosis: [x] Good [] Fair  [] Poor    Patient Requires Follow-up: [x] Yes  [] No    Plan for next treatment session:  Balance, LE strengthening    PLAN: See eval. PT 2x / week for 6 weeks. Cont 2x/week x 4-6 weeks    [x] Continue per plan of care [] Alter current plan (see comments)  [] Plan of care initiated [] Hold pending MD visit [] Discharge    Electronically signed by: Chrystal Jiménez PT    Note: If patient does not return for scheduled/ recommended follow up visits, this note will serve as a discharge from care along with most recent update on progress.

## 2020-10-23 ENCOUNTER — HOSPITAL ENCOUNTER (OUTPATIENT)
Dept: PHYSICAL THERAPY | Age: 80
Setting detail: THERAPIES SERIES
Discharge: HOME | End: 2020-10-23
Payer: MEDICARE

## 2020-10-23 PROCEDURE — 97110 THERAPEUTIC EXERCISES: CPT

## 2020-10-23 PROCEDURE — 97112 NEUROMUSCULAR REEDUCATION: CPT

## 2020-10-23 PROCEDURE — 97530 THERAPEUTIC ACTIVITIES: CPT

## 2020-10-23 NOTE — FLOWSHEET NOTE
[facility] Physical Therapy  Phone: [phone]   Fax: [phone]    Physical Therapy Daily Treatment Note  Date:  10/23/2020    Patient Name:  Afsaneh Vásquez    :  1940  MRN: [id number]  Medical/Treatment Diagnosis Information:  · Diagnosis: Neuropathy G62.9  · Treatment Diagnosis: Impaired balance, decreased ankle/hip/knee strength and stability, increased fall risk  Insurance/Certification information:  PT Insurance Information: Medicare  Physician Information:  Referring Practitioner: Abida Oliver  Plan of care signed (Y/N): []  Yes [x]  No     Date of Patient follow up with Physician:      Progress Report: []  Yes  [x]  No     Date Range for reporting period:  Beginnin20  Ending:     Progress report due (10 Rx/or 30 days whichever is less): visit #20 or      Recertification due (POC duration/ or 90 days whichever is less):  POC duration     Visit # Insurance Allowable Auth required? Date Range    +   -12  [place] []  Yes  [x]  No      Latex Allergy:  [x]NO      []YES  Preferred Language for Healthcare:   [x]English       []other:    Functional Scale:        Date assessed:  LEFS: raw score = 28; dysfunction 60-79%   10/23     Pain level:  5/10  Medial portion of left knee. \"Throbbing toothache. \"      SUBJECTIVE:  No new changes           OBJECTIVE:     Balance:  Semi-tandem: 30 secs bilaterally; Tandem:  20 secs L, 30 secs left.        PROM AROM     L R L R   Hip Flexion           Hip Abduction           Hip ER           Hip IR           Knee Flexion WNL WNL       Knee Extension WNL WNL Limited by strength Limited by strength    Dorsiflexion            Plantarflexion            Inversion            Eversion                  Strength (0-5) / Myotomes Left Right   Hip Flexion - supine       Hip Flexion - seated (L1-2) 4+ 4-   Hip Abduction 4- 3+   Hip Adduction       Hip ER 4+ 4+   Hip IR 4 4   Quads (L2-4) 3+ 3-   Hamstrings 5 5   Ankle Dorsiflexion (L4-5) 4- 4+   Ankle Plantarflexion (S1-2)       Ankle Inversion       Ankle Eversion (S1-2)       Great Toe Extension (L5)               Flexibility       Hamstrings (90/90) WFL WFL     RESTRICTIONS/PRECAUTIONS:   FALL RISK     Exercises/Interventions:     Therapeutic Exercises (51622) Resistance / level Sets/sec Reps Notes   Scifit X 5 mins       HS Stretch     IB, HR/TR  2 X 10  30\" x 2  9/22 TR difficult d/t hurt right big toe           T.G. Squats    9/28 Added, CGA/min A on/off   Step-Ups Fwd 4\"  X 10 B d   Step-Downs  4\"  X 10 B 10/19 Added, with difficulty, use of // bars    Supine SLR Flexion 1# R  2# L 2 X 10  9/25 Added, already doing as part of HEP    SAQ  2 X 10 10/1 Added   Clamshells 9/22 Added   Bridging   2  X 10     LAQ 2# L  1# R 2 X 10 B 9/22 Added, partial range for RLE    CKC TKE  9/25 Added          Cable Column        Cable Walkouts 4-way   3 plates  X 3 each 8/57 Added                  Leg Press                      Therapeutic Activities (15964)              Lateral Band Walk Lime  X 4 laps  10/9 Added           Sit to stands from edge of mat   X 8 10/5 Attempted, unable, try at future visits   10/16 with PT assist          Partial Mini Squats  Lime green band distal quad 2 X 10 10/9 Added, mod verbal/tactile cues for form           Gait training on grey mats in // bars   X 5 laps 10/23 Added, unsupported    Ramps       Neuromuscular Re-ed (25987)       Airex Tandem Balance    9/22 Added   SLS Airex   20\" x 2 9/25 Added, occ UE support    Wobbleboard    X 20 Ant/Post  X 20 Med/Lat  60\" x 1 DLS    BOSU  Step-Ups    X 15 B 9/22 Added   10/5  Mild collapse RLE today   Hip Hikes, lateral (foot flat) 10/5 Added    Alt Toe taps standing on Airex  9/25 Added, unsupported    Shuttle Step-Up to [employer]  Shuttle Hip Abduction  Shuttle Marching  Shuttle Lateral Step Ups     2 X 10 B  X 10 B  X 20 B Added 10/1          Biodex               1/2 wedge DF, PF static standing   60\" X 2 B, each 10/19 Added    Hand to opp knee grey mat   X 10 B  10/23 Added                  Manual Intervention (78453)                                                     Pt. Education:  -patient educated on diagnosis, prognosis and expectations for rehab  -all patient questions were answered    Home Exercise Program:  Pt has handout including ankle pumps, seated Hip flexion, SLR, mini squats he is currently doing given by home health PT  Discussed and demonstrated this date      Therapeutic Exercise and NMR EXR  [] (39781) Provided verbal/tactile cueing for activities related to strengthening, flexibility, endurance, ROM for improvements in  [] LE / Lumbar: LE, proximal hip, and core control with self care, mobility, lifting, ambulation. [] UE / Cervical: cervical, postural, scapular, scapulothoracic and UE control with self care, reaching, carrying, lifting, house/yardwork, driving, computer work.  [] (34841) Provided verbal/tactile cueing for activities related to improving balance, coordination, kinesthetic sense, posture, motor skill, proprioception to assist with   [] LE / lumbar: LE, proximal hip, and core control in self care, mobility, lifting, ambulation and eccentric single leg control. [] UE / cervical: cervical, scapular, scapulothoracic and UE control with self care, reaching, carrying, lifting, house/yardwork, driving, computer work.   [] (35175) Therapist is in constant attendance of 2 or more patients providing skilled therapy interventions, but not providing any significant amount of measurable one-on-one time to either patient, for improvements in  [] LE / lumbar: LE, proximal hip, and core control in self care, mobility, lifting, ambulation and eccentric single leg control. [] UE / cervical: cervical, scapular, scapulothoracic and UE control with self care, reaching, carrying, lifting, house/yardwork, driving, computer work.      NMR and Therapeutic Activities:    [] (06405 or 02066) Provided verbal/tactile cueing for activities related to improving balance, coordination, kinesthetic sense, posture, motor skill, proprioception and motor activation to allow for proper function of   [] LE: / Lumbar core, proximal hip and LE with self care and ADLs  [] UE / Cervical: cervical, postural, scapular, scapulothoracic and UE control with self care, carrying, lifting, driving, computer work.   [] (37068) Gait Re-education- Provided training and instruction to the patient for proper LE, core and proximal hip recruitment and positioning and eccentric body weight control with ambulation re-education including up and down stairs     Home Management Training / Self Care:  [] (97163) Provided self-care/home management training related to activities of daily living and compensatory training, and/or use of adaptive equipment for improvement with: ADLs and compensatory training, meal preparation, safety procedures and instruction in use of adaptive equipment, including bathing, grooming, dressing, personal hygiene, basic household cleaning and chores.      Home Exercise Program:    [x] (80857) Reviewed/Progressed HEP activities related to strengthening, flexibility, endurance, ROM of   [] LE / Lumbar: core, proximal hip and LE for functional self-care, mobility, lifting and ambulation/stair navigation   [] UE / Cervical: cervical, postural, scapular, scapulothoracic and UE control with self care, reaching, carrying, lifting, house/yardwork, driving, computer work  [] (89845)Reviewed/Progressed HEP activities related to improving balance, coordination, kinesthetic sense, posture, motor skill, proprioception of   [] LE: core, proximal hip and LE for self care, mobility, lifting, and ambulation/stair navigation    [] UE / Cervical: cervical, postural,  scapular, scapulothoracic and UE control with self care, reaching, carrying, lifting, house/yardwork, driving, computer work    Manual Treatments:  PROM / STM / Oscillations-Mobs:  G-I, II, III, IV (PA's, Inf., Post.)  [] (39190) Provided manual therapy to mobilize LE, proximal hip and/or LS spine soft tissue/joints for the purpose of modulating pain, promoting relaxation,  increasing ROM, reducing/eliminating soft tissue swelling/inflammation/restriction, improving soft tissue extensibility and allowing for proper ROM for normal function with   [] LE / lumbar: self care, mobility, lifting and ambulation. [] UE / Cervical: self care, reaching, carrying, lifting, house/yardwork, driving, computer work. Modalities:  [] (65564) Vasopneumatic compression: Utilized vasopneumatic compression to decrease edema / swelling for the purpose of improving mobility and quad tone / recruitment which will allow for increased overall function including but not limited to self-care, transfers, ambulation, and ascending / descending stairs. Modalities:      Charges:  Timed Code Treatment Minutes: 49   Total Treatment Minutes: 49     [] EVAL - LOW (64815)   [] EVAL - MOD (69181)  [] EVAL - HIGH (75852)  [] RE-EVAL (22081)  [x] QT(63987) x 1      [] Ionto  [x] NMR (24062) x 1       [] Vaso  [] Manual (37340) x       [] Ultrasound  [x] TA x 1       [] Mech Traction (80869)  [] Aquatic Therapy x      [] ES (un) (71849):   [] Home Management Training x  [] ES(attended) (54474)   [] Dry Needling 1-2 muscles (54598):  [] Dry Needling 3+ muscles (736346)  [] Group:      [] Other:     GOALS: Patient stated goal: strengthen legs to do stuff, such as golfing if able   [x]? Progressing: []? Met: []? Not Met: []? Adjusted     Therapist goals for Patient:   Short Term Goals: To be achieved in: 2 weeks  1. Independent in HEP and progression per patient tolerance, in order to prevent re-injury. [x]? Progressing: []? Met: []? Not Met: []? Adjusted  2. Patient will have a decrease in pain to facilitate improvement in movement, function, and ADLs as indicated by Functional Deficits. [x]?

## 2020-10-26 ENCOUNTER — HOSPITAL ENCOUNTER (OUTPATIENT)
Dept: PHYSICAL THERAPY | Age: 80
Setting detail: THERAPIES SERIES
Discharge: HOME | End: 2020-10-26
Payer: MEDICARE

## 2020-10-26 PROCEDURE — 97530 THERAPEUTIC ACTIVITIES: CPT

## 2020-10-26 PROCEDURE — 97112 NEUROMUSCULAR REEDUCATION: CPT

## 2020-10-26 PROCEDURE — 97110 THERAPEUTIC EXERCISES: CPT

## 2020-10-26 NOTE — FLOWSHEET NOTE
[address] [facility] Physical Therapy  Phone: [phone]   Fax: [phone]    Physical Therapy Daily Treatment Note  Date:  10/26/2020    Patient Name:  Francisco Harvey    :  1940  MRN: [id number]  Medical/Treatment Diagnosis Information:  · Diagnosis: Neuropathy G62.9  · Treatment Diagnosis: Impaired balance, decreased ankle/hip/knee strength and stability, increased fall risk  Insurance/Certification information:  PT Insurance Information: Medicare  Physician Information:  Referring Practitioner: Parth Iyer  Plan of care signed (Y/N): []  Yes [x]  No     Date of Patient follow up with Physician:      Progress Report: []  Yes  [x]  No     Date Range for reporting period:  Beginnin20  Ending:     Progress report due (10 Rx/or 30 days whichever is less): visit #20 or      Recertification due (POC duration/ or 90 days whichever is less):  POC duration     Visit # Insurance Allowable Auth required? Date Range    +   2/8-12  [place] []  Yes  [x]  No      Latex Allergy:  [x]NO      []YES  Preferred Language for Healthcare:   [x]English       []other:    Functional Scale:        Date assessed:  LEFS: raw score = 28; dysfunction 60-79%   10/23     Pain level:  5/10  Medial portion of left knee. \"Throbbing toothache. \"      SUBJECTIVE:  Still same problems with getting up         OBJECTIVE:     Balance:  Semi-tandem: 30 secs bilaterally; Tandem:  20 secs L, 30 secs left.        PROM AROM     L R L R   Hip Flexion           Hip Abduction           Hip ER           Hip IR           Knee Flexion WNL WNL       Knee Extension WNL WNL Limited by strength Limited by strength    Dorsiflexion            Plantarflexion            Inversion            Eversion                  Strength (0-5) / Myotomes Left Right   Hip Flexion - supine       Hip Flexion - seated (L1-2) 4+ 4-   Hip Abduction 4- 3+   Hip Adduction       Hip ER 4+ 4+   Hip IR 4 4   Quads (L2-4) 3+ 3-   Hamstrings 5 5   Ankle Dorsiflexion (L4-5) 4- 4+   Ankle Plantarflexion (S1-2)       Ankle Inversion       Ankle Eversion (S1-2)       Great Toe Extension (L5)               Flexibility       Hamstrings (90/90) WFL WFL     RESTRICTIONS/PRECAUTIONS:   FALL RISK     Exercises/Interventions:     Therapeutic Exercises (01083) Resistance / level Sets/sec Reps Notes   Scifit X 5 mins       HS Stretch     IB, HR/TR  2 X 10  30\" x 2  9/22 TR difficult d/t hurt right big toe           T.G. Squats    9/28 Added, CGA/min A on/off   Step-Ups Fwd 4\"  X 10 B    Step-Downs  4\"  X 10 B 10/19 Added, with difficulty, use of // bars    Supine SLR Flexion 1# R  2# L 2 X 10  9/25 Added, already doing as part of HEP    SAQ  2 X 10 10/1 Added   Clamshells 9/22 Added   Bridging   2  X 10     LAQ 2# L  1# R 2 X 10 B 9/22 Added, partial range for RLE    CKC TKE  9/25 Added          Cable Column        Cable Walkouts 4-way   3 plates  X 3 each 2/62 Added                  Leg Press                      Therapeutic Activities (62898)              Lateral Band Walk Lime  X 4 laps  10/9 Added           Sit to stands from edge of mat   X 8 10/5 Attempted, unable, try at future visits   10/16 with PT assist          Partial Mini Squats   10/9 Added, mod verbal/tactile cues for form           Gait training on grey mats in // bars   X 5 laps 10/23 Added, unsupported    Ramps up/down with SPC by basketball court    X 4 laps 10/26 Added                               Neuromuscular Re-ed (91867)       Airex Tandem Balance    9/22 Added   SLS Airex   20\" x 3 9/25 Added, occ UE support    Wobbleboard    X 20 Ant/Post  X 20 Med/Lat  60\" x 1 DLS    BOSU  Step-Ups    X 15 B 9/22 Added   10/5  Mild collapse RLE today   Hip Hikes, lateral (foot flat) 10/5 Added    Alt Toe taps standing on Airex  9/25 Added, unsupported    Shuttle Step-Up to [employer]  Shuttle Hip Abduction  Shuttle Marching      2 X 10 B  X 10 B  X 20 B Added 10/1          Biodex               1/2 wedge DF, PF static standing   60\" X 2 B, each 10/19 Added    Hand to opp knee grey mat with ambulation    X 3 laps unsupported  10/23 Added                  Manual Intervention (82152)                                                     Pt. Education:  -patient educated on diagnosis, prognosis and expectations for rehab  -all patient questions were answered    Home Exercise Program:  Pt has handout including ankle pumps, seated Hip flexion, SLR, mini squats he is currently doing given by home health PT  Discussed and demonstrated this date      Therapeutic Exercise and NMR EXR  [] (19283) Provided verbal/tactile cueing for activities related to strengthening, flexibility, endurance, ROM for improvements in  [] LE / Lumbar: LE, proximal hip, and core control with self care, mobility, lifting, ambulation. [] UE / Cervical: cervical, postural, scapular, scapulothoracic and UE control with self care, reaching, carrying, lifting, house/yardwork, driving, computer work.  [] (10021) Provided verbal/tactile cueing for activities related to improving balance, coordination, kinesthetic sense, posture, motor skill, proprioception to assist with   [] LE / lumbar: LE, proximal hip, and core control in self care, mobility, lifting, ambulation and eccentric single leg control. [] UE / cervical: cervical, scapular, scapulothoracic and UE control with self care, reaching, carrying, lifting, house/yardwork, driving, computer work.   [] (35207) Therapist is in constant attendance of 2 or more patients providing skilled therapy interventions, but not providing any significant amount of measurable one-on-one time to either patient, for improvements in  [] LE / lumbar: LE, proximal hip, and core control in self care, mobility, lifting, ambulation and eccentric single leg control. [] UE / cervical: cervical, scapular, scapulothoracic and UE control with self care, reaching, carrying, lifting, house/yardwork, driving, computer work.      NMR and Therapeutic Activities:    [] (03685 or 90758) Provided verbal/tactile cueing for activities related to improving balance, coordination, kinesthetic sense, posture, motor skill, proprioception and motor activation to allow for proper function of   [] LE: / Lumbar core, proximal hip and LE with self care and ADLs  [] UE / Cervical: cervical, postural, scapular, scapulothoracic and UE control with self care, carrying, lifting, driving, computer work.   [] (69834) Gait Re-education- Provided training and instruction to the patient for proper LE, core and proximal hip recruitment and positioning and eccentric body weight control with ambulation re-education including up and down stairs     Home Management Training / Self Care:  [] (87770) Provided self-care/home management training related to activities of daily living and compensatory training, and/or use of adaptive equipment for improvement with: ADLs and compensatory training, meal preparation, safety procedures and instruction in use of adaptive equipment, including bathing, grooming, dressing, personal hygiene, basic household cleaning and chores.      Home Exercise Program:    [x] (83355) Reviewed/Progressed HEP activities related to strengthening, flexibility, endurance, ROM of   [] LE / Lumbar: core, proximal hip and LE for functional self-care, mobility, lifting and ambulation/stair navigation   [] UE / Cervical: cervical, postural, scapular, scapulothoracic and UE control with self care, reaching, carrying, lifting, house/yardwork, driving, computer work  [] (63554)Reviewed/Progressed HEP activities related to improving balance, coordination, kinesthetic sense, posture, motor skill, proprioception of   [] LE: core, proximal hip and LE for self care, mobility, lifting, and ambulation/stair navigation    [] UE / Cervical: cervical, postural,  scapular, scapulothoracic and UE control with self care, reaching, carrying, lifting, house/yardwork, driving, computer work    Manual Treatments:  PROM / STM / Oscillations-Mobs:  G-I, II, III, IV (PA's, Inf., Post.)  [] (05818) Provided manual therapy to mobilize LE, proximal hip and/or LS spine soft tissue/joints for the purpose of modulating pain, promoting relaxation,  increasing ROM, reducing/eliminating soft tissue swelling/inflammation/restriction, improving soft tissue extensibility and allowing for proper ROM for normal function with   [] LE / lumbar: self care, mobility, lifting and ambulation. [] UE / Cervical: self care, reaching, carrying, lifting, house/yardwork, driving, computer work. Modalities:  [] (71946) Vasopneumatic compression: Utilized vasopneumatic compression to decrease edema / swelling for the purpose of improving mobility and quad tone / recruitment which will allow for increased overall function including but not limited to self-care, transfers, ambulation, and ascending / descending stairs. Modalities:      Charges:  Timed Code Treatment Minutes: 44   Total Treatment Minutes: 44     [] EVAL - LOW (00903)   [] EVAL - MOD (61795)  [] EVAL - HIGH (51501)  [] RE-EVAL (47507)  [x] NT(44206) x 1      [] Ionto  [x] NMR (26952) x 1       [] Vaso  [] Manual (88529) x       [] Ultrasound  [x] TA x 1       [] Mech Traction (00564)  [] Aquatic Therapy x      [] ES (un) (71905):   [] Home Management Training x  [] ES(attended) (56566)   [] Dry Needling 1-2 muscles (38166):  [] Dry Needling 3+ muscles (866504)  [] Group:      [] Other:     GOALS: Patient stated goal: strengthen legs to do stuff, such as golfing if able   [x]? Progressing: []? Met: []? Not Met: []? Adjusted     Therapist goals for Patient:   Short Term Goals: To be achieved in: 2 weeks  1. Independent in HEP and progression per patient tolerance, in order to prevent re-injury. [x]? Progressing: []? Met: []? Not Met: []? Adjusted  2.  Patient will have a decrease in pain to facilitate improvement in movement, function, and ADLs as indicated by Functional Deficits. [x]? Progressing: []? Met: []? Not Met: []? Adjusted     Long Term Goals: To be achieved in: 6 weeks  1. Disability index score of 30% or less for the LEFS to assist with reaching prior level of function. [x]? Progressing: []? Met: []? Not Met: []? Adjusted  3. Patient will demonstrate an increase in Strength to at least 4/5 in RLE as well as good proximal hip strength and control to allow for proper functional mobility as indicated by patients Functional Deficits. [x]? Progressing: []? Met: []? Not Met: []? Adjusted  4. Patient will return to functional activities including ambulating 3-4 blocks or around [facility] by patient's home without increased symptoms or restriction to resume PLOF--Said he hasn't tried to walk around the mall yet    [x]? Progressing: []? Met: []? Not Met: []? Adjusted  5. Pt will reduce risk for falls by improving semi-tandem balance to 30 secs/ NEW GOAL:   Pt will improve tandem balance to 30 secs and/or perform 30 sec sit to stand test with good mechanics to help reduce risk for falls   []? Progressing: [x]? Met: []? Not Met: [x]? Adjusted         Overall Progression Towards Functional goals/ Treatment Progress Update:  [x] Patient is progressing as expected towards functional goals listed. [] Progression is slowed due to complexities/Impairments listed. [] Progression has been slowed due to co-morbidities.   [] Plan just implemented, too soon to assess goals progression <30days   [] Goals require adjustment due to lack of progress  [] Patient is not progressing as expected and requires additional follow up with physician  [] Other    Persisting Functional Limitations/Impairments:  []Sleeping []Sitting               [x]Standing [x]Transfers        [x]Walking []Kneeling               [x]Stairs [x]Squatting / bending   []ADLs [x]Reaching  [x]Lifting  [x]Housework  []Driving []Job related tasks  []Sports/Recreation []Other:        ASSESSMENT: Progressed with uneven surface/ramp training today with fair tolerance. Pt highly dependant on use of RW, and needed cues during functional steps and balancing activities to prevent full knee extension to help reduce buckling episode. Had 1 small episode that was corrected with preventing hyperextension. Treatment/Activity Tolerance:  [x] Patient able to complete tx  [] Patient limited by fatigue  [] Patient limited by pain  [] Patient limited by other medical complications  [] Other:     Prognosis: [x] Good [] Fair  [] Poor    Patient Requires Follow-up: [x] Yes  [] No    Plan for next treatment session:  Balance, LE strengthening    PLAN: See eval. PT 2x / week for 6 weeks. Cont 2x/week x 4-6 weeks    [x] Continue per plan of care [] Alter current plan (see comments)  [] Plan of care initiated [] Hold pending MD visit [] Discharge    Electronically signed by: Heather Jolley PT    Note: If patient does not return for scheduled/ recommended follow up visits, this note will serve as a discharge from care along with most recent update on progress.

## 2020-10-30 ENCOUNTER — HOSPITAL ENCOUNTER (OUTPATIENT)
Dept: PHYSICAL THERAPY | Age: 80
Setting detail: THERAPIES SERIES
Discharge: HOME | End: 2020-10-30
Payer: MEDICARE

## 2020-10-30 PROCEDURE — 97112 NEUROMUSCULAR REEDUCATION: CPT

## 2020-10-30 PROCEDURE — 97530 THERAPEUTIC ACTIVITIES: CPT

## 2020-10-30 PROCEDURE — 97110 THERAPEUTIC EXERCISES: CPT

## 2020-10-30 NOTE — FLOWSHEET NOTE
[address] [facility] Therapy  Phone: [phone]   Fax: [phone]    Physical Therapy Daily Treatment Note  Date:  10/30/2020    Patient Name:  Shaneka Hagen    :  1940  MRN: [id number]  Medical/Treatment Diagnosis Information:  · Diagnosis: Neuropathy G62.9  · Treatment Diagnosis: Impaired balance, decreased ankle/hip/knee strength and stability, increased fall risk  Insurance/Certification information:  PT Insurance Information: Medicare  Physician Information:  Referring Practitioner: Chencho Patel  Plan of care signed (Y/N): []  Yes [x]  No     Date of Patient follow up with Physician:      Progress Report: []  Yes  [x]  No     Date Range for reporting period:  Beginnin20  Ending:     Progress report due (10 Rx/or 30 days whichever is less): visit #20 or      Recertification due (POC duration/ or 90 days whichever is less):  POC duration      Visit # Insurance Allowable Auth required? Date Range    +   3/8-12  [place] []  Yes  [x]  No      Latex Allergy:  [x]NO      []YES  Preferred Language for Healthcare:   [x]English       []other:    Functional Scale:        Date assessed:  LEFS: raw score = 28; dysfunction 60-79%   10/23     Pain level:  5/10  Medial portion of left knee. \"Throbbing toothache. \"      SUBJECTIVE:  Been doing a lot of running around since daughter had a knee replacement this week. Still overall have a lack of balance            OBJECTIVE:     Balance:  Semi-tandem: 30 secs bilaterally; Tandem:  20 secs L, 30 secs left.        PROM AROM     L R L R   Hip Flexion           Hip Abduction           Hip ER           Hip IR           Knee Flexion WNL WNL       Knee Extension WNL WNL Limited by strength Limited by strength    Dorsiflexion            Plantarflexion            Inversion            Eversion                  Strength (0-5) / Myotomes Left Right   Hip Flexion - supine       Hip Flexion - seated (L1-2) 4+ 4-   Hip Abduction 4- 3+   Hip Adduction       Hip ER 4+ 4+   Hip IR 4 4   Quads (L2-4) 3+ 3-   Hamstrings 5 5   Ankle Dorsiflexion (L4-5) 4- 4+   Ankle Plantarflexion (S1-2)       Ankle Inversion       Ankle Eversion (S1-2)       Great Toe Extension (L5)               Flexibility       Hamstrings (90/90) WFL WFL     RESTRICTIONS/PRECAUTIONS:   FALL RISK     Exercises/Interventions:     Therapeutic Exercises (92272) Resistance / level Sets/sec Reps Notes   Scifit X 5 mins       HS Stretch     IB, HR/TR  2 X 10  30\" x 2  9/22 TR difficult d/t hurt right big toe           T.G. Squats    9/28 Added, CGA/min A on/off   Step-Ups Fwd    Step-Downs  10/19 Added, with difficulty, use of // bars    Supine SLR Flexion 1# R  2# L 3 X 10  9/25 Added, already doing as part of HEP    SAQ  2 X 10 10/1 Added   Clamshells 9/22 Added   Bridging   2  X 10     LAQ 2# L  1# R 3 X 10 B 9/22 Added, partial range for RLE    CKC TKE  3 plates 5\" X 15 6/99 Added          Cable Column        Cable Walkouts 4-way   3 plates  X 3 each 5/04 Added                  Leg Press                      Therapeutic Activities (95317)              Lateral Band Walk Dark green band  X 4 laps  10/9 Added           Sit to stands from edge of mat   X 8 10/5 Attempted, unable, try at future visits   10/16 with PT assist          Partial Mini Squats  Dark green band distal quad  2 X 10 10/9 Added, mod verbal/tactile cues for form           Gait training on grey mats in // bars    10/23 Added, unsupported    Ramps up/down with SPC by basketball court     10/26 Added          Resisted squat with pulldown at CC     10/30 tried, pt apprehensive                 Neuromuscular Re-ed (64793)       Airex Tandem Balance   20\" x 2 9/22 Added   SLS Airex   10\" x 6 9/25 Added, occ UE support    Wobbleboard    X 20 Ant/Post  X 20 Med/Lat  60\" x 1 DLS    BOSU  Step-Ups    X 15 B 9/22 Added   10/5  Mild collapse RLE today   Hip Hikes, lateral (foot flat) 10/5 Added    Alt Toe taps standing on Airex  9/25 Added, unsupported    Shuttle Step-Up to SLS  Shuttle Hip Abduction  Shuttle Marching      2   X 10 B  X 20 B Added 10/1          Biodex               1/2 wedge DF, PF static standing   10/19 Added    Hand to opp knee grey mat with ambulation    X 3 laps unsupported  10/23 Added                  Manual Intervention (55239)                                                     Pt. Education:  -patient educated on diagnosis, prognosis and expectations for rehab  -all patient questions were answered    Home Exercise Program:  Pt has handout including ankle pumps, seated Hip flexion, SLR, mini squats he is currently doing given by home health PT  Discussed and demonstrated this date      Therapeutic Exercise and NMR EXR  [] (99031) Provided verbal/tactile cueing for activities related to strengthening, flexibility, endurance, ROM for improvements in  [] LE / Lumbar: LE, proximal hip, and core control with self care, mobility, lifting, ambulation. [] UE / Cervical: cervical, postural, scapular, scapulothoracic and UE control with self care, reaching, carrying, lifting, house/yardwork, driving, computer work.  [] (73631) Provided verbal/tactile cueing for activities related to improving balance, coordination, kinesthetic sense, posture, motor skill, proprioception to assist with   [] LE / lumbar: LE, proximal hip, and core control in self care, mobility, lifting, ambulation and eccentric single leg control. [] UE / cervical: cervical, scapular, scapulothoracic and UE control with self care, reaching, carrying, lifting, house/yardwork, driving, computer work.   [] (67354) Therapist is in constant attendance of 2 or more patients providing skilled therapy interventions, but not providing any significant amount of measurable one-on-one time to either patient, for improvements in  [] LE / lumbar: LE, proximal hip, and core control in self care, mobility, lifting, ambulation and eccentric single leg control.    [] UE / cervical: cervical, scapular, scapulothoracic and UE control with self care, reaching, carrying, lifting, house/yardwork, driving, computer work. NMR and Therapeutic Activities:    [] (52427 or 94626) Provided verbal/tactile cueing for activities related to improving balance, coordination, kinesthetic sense, posture, motor skill, proprioception and motor activation to allow for proper function of   [] LE: / Lumbar core, proximal hip and LE with self care and ADLs  [] UE / Cervical: cervical, postural, scapular, scapulothoracic and UE control with self care, carrying, lifting, driving, computer work.   [] (95205) Gait Re-education- Provided training and instruction to the patient for proper LE, core and proximal hip recruitment and positioning and eccentric body weight control with ambulation re-education including up and down stairs     Home Management Training / Self Care:  [] (78776) Provided self-care/home management training related to activities of daily living and compensatory training, and/or use of adaptive equipment for improvement with: ADLs and compensatory training, meal preparation, safety procedures and instruction in use of adaptive equipment, including bathing, grooming, dressing, personal hygiene, basic household cleaning and chores.      Home Exercise Program:    [x] (21765) Reviewed/Progressed HEP activities related to strengthening, flexibility, endurance, ROM of   [] LE / Lumbar: core, proximal hip and LE for functional self-care, mobility, lifting and ambulation/stair navigation   [] UE / Cervical: cervical, postural, scapular, scapulothoracic and UE control with self care, reaching, carrying, lifting, house/yardwork, driving, computer work  [] (09519)Reviewed/Progressed HEP activities related to improving balance, coordination, kinesthetic sense, posture, motor skill, proprioception of   [] LE: core, proximal hip and LE for self care, mobility, lifting, and ambulation/stair navigation [] UE / Cervical: cervical, postural,  scapular, scapulothoracic and UE control with self care, reaching, carrying, lifting, house/yardwork, driving, computer work    Manual Treatments:  PROM / STM / Oscillations-Mobs:  G-I, II, III, IV (PA's, Inf., Post.)  [] (62082) Provided manual therapy to mobilize LE, proximal hip and/or LS spine soft tissue/joints for the purpose of modulating pain, promoting relaxation,  increasing ROM, reducing/eliminating soft tissue swelling/inflammation/restriction, improving soft tissue extensibility and allowing for proper ROM for normal function with   [] LE / lumbar: self care, mobility, lifting and ambulation. [] UE / Cervical: self care, reaching, carrying, lifting, house/yardwork, driving, computer work. Modalities:  [] (91865) Vasopneumatic compression: Utilized vasopneumatic compression to decrease edema / swelling for the purpose of improving mobility and quad tone / recruitment which will allow for increased overall function including but not limited to self-care, transfers, ambulation, and ascending / descending stairs. Modalities:      Charges:  Timed Code Treatment Minutes: 44   Total Treatment Minutes: 44     [] EVAL - LOW (06639)   [] EVAL - MOD (19532)  [] EVAL - HIGH (46642)  [] RE-EVAL (22358)  [x] UU(64601) x 1      [] Ionto  [x] NMR (85514) x 1       [] Vaso  [] Manual (08359) x       [] Ultrasound  [x] TA x 1       [] Mech Traction (17356)  [] Aquatic Therapy x      [] ES (un) (07148):   [] Home Management Training x  [] ES(attended) (34226)   [] Dry Needling 1-2 muscles (73082):  [] Dry Needling 3+ muscles (320019)  [] Group:      [] Other:     GOALS: Patient stated goal: strengthen legs to do stuff, such as golfing if able   [x]? Progressing: []? Met: []? Not Met: []? Adjusted     Therapist goals for Patient:   Short Term Goals: To be achieved in: 2 weeks  1. Independent in HEP and progression per patient tolerance, in order to prevent re-injury.

## 2020-11-03 ENCOUNTER — HOSPITAL ENCOUNTER (OUTPATIENT)
Dept: PHYSICAL THERAPY | Age: 80
Setting detail: THERAPIES SERIES
Discharge: HOME | End: 2020-11-03
Payer: MEDICARE

## 2020-11-03 PROCEDURE — 97112 NEUROMUSCULAR REEDUCATION: CPT

## 2020-11-03 PROCEDURE — 97530 THERAPEUTIC ACTIVITIES: CPT

## 2020-11-03 PROCEDURE — 97110 THERAPEUTIC EXERCISES: CPT

## 2020-11-03 NOTE — FLOWSHEET NOTE
Ankle Dorsiflexion (L4-5) 4- 4+   Ankle Plantarflexion (S1-2)       Ankle Inversion       Ankle Eversion (S1-2)       Great Toe Extension (L5)               Flexibility       Hamstrings (90/90) WFL WFL     RESTRICTIONS/PRECAUTIONS:   FALL RISK     Exercises/Interventions:     Therapeutic Exercises (03101) Resistance / level Sets/sec Reps Notes   Scifit X 5 mins       HS Stretch     IB, HR/TR  2 X 10  30\" x 2  9/22 TR difficult d/t hurt right big toe           T.G. Squats    9/28 Added, CGA/min A on/off   Step-Ups Fwd    Step-Downs  10/19 Added, with difficulty, use of // bars    Supine SLR Flexion 1# R  2# L 3 X 10  9/25 Added, already doing as part of HEP    SAQ  2 X 10 10/1 Added   Clamshells 9/22 Added   Bridging   2  X 10     LAQ 2# L  1# R 3 X 10 B 9/22 Added, partial range for RLE    CKC TKE  3 plates 5\" X 15 6/44 Added          Cable Column        Cable Walkouts 4-way   3 plates  X 3 each 0/56 Added                  Leg Press                      Therapeutic Activities (87739)              Lateral Band Walk Dark green band  X 4 laps  10/9 Added           Sit to stands from edge of mat   X 8 10/5 Attempted, unable, try at future visits   10/16 with PT assist          Partial Mini Squats  Dark green band distal quad  2 X 10 10/9 Added, mod verbal/tactile cues for form           Gait training on grey mats in // bars    10/23 Added, unsupported    Ramps up/down with SPC by basketball court    X 4 laps 10/26 Added  11/3  Descended forward, ascended backward  With [facility]          Resisted squat with pulldown at CC     10/30 tried, pt apprehensive                 Neuromuscular Re-ed (84415)       Airex Tandem Balance   30\" x 2 9/22 Added   SLS Airex   20\" x 3 B 9/25 Added, occ UE support    Wobbleboard carpet   OH lifts yellow x 10  Fwd reach yellow ball x 10     BOSU  Step-Ups    X 15 B 9/22 Added   10/5  Mild collapse RLE today   Hip Hikes, lateral (foot flat) 10/5 Added    Alt Toe taps standing on Airex  9/25 Added, unsupported    Shuttle Step-Up to [employer]  Shuttle Hip Abduction  Shuttle Marching      2   X 10 B  X 20 B Added 10/1          Biodex               1/2 wedge DF, PF static standing   10/19 Added    Hand to opp knee grey mat with ambulation    X 3 laps unsupported  10/23 Added                  Manual Intervention (03550)                                                     Pt. Education:  -patient educated on diagnosis, prognosis and expectations for rehab  -all patient questions were answered    Home Exercise Program:  Pt has handout including ankle pumps, seated Hip flexion, SLR, mini squats he is currently doing given by home health PT  Discussed and demonstrated this date      Therapeutic Exercise and NMR EXR  [] (02343) Provided verbal/tactile cueing for activities related to strengthening, flexibility, endurance, ROM for improvements in  [] LE / Lumbar: LE, proximal hip, and core control with self care, mobility, lifting, ambulation. [] UE / Cervical: cervical, postural, scapular, scapulothoracic and UE control with self care, reaching, carrying, lifting, house/yardwork, driving, computer work.  [] (00772) Provided verbal/tactile cueing for activities related to improving balance, coordination, kinesthetic sense, posture, motor skill, proprioception to assist with   [] LE / lumbar: LE, proximal hip, and core control in self care, mobility, lifting, ambulation and eccentric single leg control. [] UE / cervical: cervical, scapular, scapulothoracic and UE control with self care, reaching, carrying, lifting, house/yardwork, driving, computer work.   [] (21673) Therapist is in constant attendance of 2 or more patients providing skilled therapy interventions, but not providing any significant amount of measurable one-on-one time to either patient, for improvements in  [] LE / lumbar: LE, proximal hip, and core control in self care, mobility, lifting, ambulation and eccentric single leg control.    [] UE / cervical: cervical, scapular, scapulothoracic and UE control with self care, reaching, carrying, lifting, house/yardwork, driving, computer work. NMR and Therapeutic Activities:    [] (95231 or 88297) Provided verbal/tactile cueing for activities related to improving balance, coordination, kinesthetic sense, posture, motor skill, proprioception and motor activation to allow for proper function of   [] LE: / Lumbar core, proximal hip and LE with self care and ADLs  [] UE / Cervical: cervical, postural, scapular, scapulothoracic and UE control with self care, carrying, lifting, driving, computer work.   [] (79287) Gait Re-education- Provided training and instruction to the patient for proper LE, core and proximal hip recruitment and positioning and eccentric body weight control with ambulation re-education including up and down stairs     Home Management Training / Self Care:  [] (16090) Provided self-care/home management training related to activities of daily living and compensatory training, and/or use of adaptive equipment for improvement with: ADLs and compensatory training, meal preparation, safety procedures and instruction in use of adaptive equipment, including bathing, grooming, dressing, personal hygiene, basic household cleaning and chores.      Home Exercise Program:    [x] (72155) Reviewed/Progressed HEP activities related to strengthening, flexibility, endurance, ROM of   [] LE / Lumbar: core, proximal hip and LE for functional self-care, mobility, lifting and ambulation/stair navigation   [] UE / Cervical: cervical, postural, scapular, scapulothoracic and UE control with self care, reaching, carrying, lifting, house/yardwork, driving, computer work  [] (04060)Reviewed/Progressed HEP activities related to improving balance, coordination, kinesthetic sense, posture, motor skill, proprioception of   [] LE: core, proximal hip and LE for self care, mobility, lifting, and ambulation/stair navigation    [] UE / Cervical: cervical, postural,  scapular, scapulothoracic and UE control with self care, reaching, carrying, lifting, house/yardwork, driving, computer work    Manual Treatments:  PROM / STM / Oscillations-Mobs:  G-I, II, III, IV (PA's, Inf., Post.)  [] (51298) Provided manual therapy to mobilize LE, proximal hip and/or LS spine soft tissue/joints for the purpose of modulating pain, promoting relaxation,  increasing ROM, reducing/eliminating soft tissue swelling/inflammation/restriction, improving soft tissue extensibility and allowing for proper ROM for normal function with   [] LE / lumbar: self care, mobility, lifting and ambulation. [] UE / Cervical: self care, reaching, carrying, lifting, house/yardwork, driving, computer work. Modalities:  [] (78616) Vasopneumatic compression: Utilized vasopneumatic compression to decrease edema / swelling for the purpose of improving mobility and quad tone / recruitment which will allow for increased overall function including but not limited to self-care, transfers, ambulation, and ascending / descending stairs. Modalities:      Charges:  Timed Code Treatment Minutes: 43   Total Treatment Minutes: 43     [] EVAL - LOW (13549)   [] EVAL - MOD (54978)  [] EVAL - HIGH (08653)  [] RE-EVAL (24431)  [x] OZ(52333) x 1      [] Ionto  [x] NMR (80950) x 1       [] Vaso  [] Manual (71263) x       [] Ultrasound  [x] TA x 1       [] Mech Traction (85706)  [] Aquatic Therapy x      [] ES (un) (62837):   [] Home Management Training x  [] ES(attended) (21041)   [] Dry Needling 1-2 muscles (38897):  [] Dry Needling 3+ muscles (278406)  [] Group:      [] Other:     GOALS: Patient stated goal: strengthen legs to do stuff, such as golfing if able   [x]? Progressing: []? Met: []? Not Met: []? Adjusted     Therapist goals for Patient:   Short Term Goals: To be achieved in: 2 weeks  1. Independent in HEP and progression per patient tolerance, in order to prevent re-injury. [x]? Progressing: []? Met: []? Not Met: []? Adjusted  2. Patient will have a decrease in pain to facilitate improvement in movement, function, and ADLs as indicated by Functional Deficits. [x]? Progressing: []? Met: []? Not Met: []? Adjusted     Long Term Goals: To be achieved in: 6 weeks  1. Disability index score of 30% or less for the LEFS to assist with reaching prior level of function. [x]? Progressing: []? Met: []? Not Met: []? Adjusted  3. Patient will demonstrate an increase in Strength to at least 4/5 in RLE as well as good proximal hip strength and control to allow for proper functional mobility as indicated by patients Functional Deficits. [x]? Progressing: []? Met: []? Not Met: []? Adjusted  4. Patient will return to functional activities including ambulating 3-4 blocks or around [facility] by patient's home without increased symptoms or restriction to resume PLOF--Said he hasn't tried [place] walk around the mall yet    [x]? Progressing: []? Met: []? Not Met: []? Adjusted  5. Pt will reduce risk for falls by improving semi-tandem balance to 30 secs/ NEW GOAL:   Pt will improve tandem balance to 30 secs and/or perform 30 sec sit to stand test with good mechanics to help reduce risk for falls   []? Progressing: [x]? Met: []? Not Met: [x]? Adjusted         Overall Progression Towards Functional goals/ Treatment Progress Update:  [x] Patient is progressing as expected towards functional goals listed. [] Progression is slowed due to complexities/Impairments listed. [] Progression has been slowed due to co-morbidities.   [] Plan just implemented, too soon to assess goals progression <30days   [] Goals require adjustment due to lack of progress  [] Patient is not progressing as expected and requires additional follow up with physician  [] Other    Persisting Functional Limitations/Impairments:  []Sleeping []Sitting               [x]Standing [x]Transfers        [x]Walking []Kneeling               [x]Stairs [x]Squatting / bending   []ADLs [x]Reaching  [x]Lifting  [x]Housework  []Driving []Job related tasks  []Sports/Recreation []Other:        ASSESSMENT:  Continues to display quad/hip weakness that gives pt difficulty with sit to stand transfers and leg buckling. Pt highly dependant on use of RW, and needed cues during functional steps and balancing activities to prevent full knee extension to help reduce buckling episode. Needs continued aggressive quad/hip strengthening to improve safety with transfers to allow pt to be able to get up and down from all chairs with improved efficiency in order to reduce fall risk. Pt showed better stability without any episodes of knee buckling during today's visit, with improved efficiency performing the ramp. Neds more work on uneven terrain       Treatment/Activity Tolerance:  [x] Patient able to complete tx  [] Patient limited by fatigue  [] Patient limited by pain  [] Patient limited by other medical complications  [] Other:     Prognosis: [x] Good [] Fair  [] Poor    Patient Requires Follow-up: [x] Yes  [] No    Plan for next treatment session:  Balance, LE strengthening    PLAN: See eval. PT 2x / week for 6 weeks. Cont 2x/week x 4-6 weeks    [x] Continue per plan of care [] Alter current plan (see comments)  [] Plan of care initiated [] Hold pending MD visit [] Discharge    Electronically signed by: Marylou Arias PT    Note: If patient does not return for scheduled/ recommended follow up visits, this note will serve as a discharge from care along with most recent update on progress.

## 2020-11-06 ENCOUNTER — APPOINTMENT (OUTPATIENT)
Dept: PHYSICAL THERAPY | Age: 80
End: 2020-11-06
Payer: MEDICARE

## 2020-11-10 ENCOUNTER — HOSPITAL ENCOUNTER (OUTPATIENT)
Dept: PHYSICAL THERAPY | Age: 80
Setting detail: THERAPIES SERIES
Discharge: HOME | End: 2020-11-10
Payer: MEDICARE

## 2020-11-10 PROCEDURE — 97530 THERAPEUTIC ACTIVITIES: CPT

## 2020-11-10 PROCEDURE — 97110 THERAPEUTIC EXERCISES: CPT

## 2020-11-10 NOTE — FLOWSHEET NOTE
[address] [facility] Physical Therapy  Phone: [phone]   Fax: [phone]    Physical Therapy Daily Treatment Note  Date:  11/10/2020    Patient Name:  Shaneka Hagen    :  1940  MRN: [id number]  Medical/Treatment Diagnosis Information:  · Diagnosis: Neuropathy G62.9  · Treatment Diagnosis: Impaired balance, decreased ankle/hip/knee strength and stability, increased fall risk  Insurance/Certification information:  PT Insurance Information: Medicare  Physician Information:  Referring Practitioner: Chencho Patel  Plan of care signed (Y/N): []  Yes [x]  No     Date of Patient follow up with Physician:      Progress Report: []  Yes  [x]  No     Date Range for reporting period:  Beginnin20  Ending:     Progress report due (10 Rx/or 30 days whichever is less): visit #20 or      Recertification due (POC duration/ or 90 days whichever is less):  POC duration      Visit # Insurance Allowable Auth required? Date Range    +   /8-12  [place] []  Yes  [x]  No       Latex Allergy:  [x]NO      []YES  Preferred Language for Healthcare:   [x]English       []other:    Functional Scale:        Date assessed:  LEFS: raw score = 28; dysfunction 60-79%   10/23     Pain level:  5/10  Medial portion of left knee. \"Throbbing toothache. \"      SUBJECTIVE:  Don't think he's doing too good, still hard to get up from a chair, and balance problems. OBJECTIVE:     Balance:  Semi-tandem: 30 secs bilaterally; Tandem:  20 secs L, 30 secs left.         PROM AROM     L R L R   Hip Flexion           Hip Abduction           Hip ER           Hip IR           Knee Flexion WNL WNL       Knee Extension WNL WNL Limited by strength Limited by strength    Dorsiflexion            Plantarflexion            Inversion            Eversion                  Strength (0-5) / Myotomes Left Right   Hip Flexion - supine       Hip Flexion - seated (L1-2) 4+ 4-   Hip Abduction 4- 3+   Hip Adduction     Hip ER 4+ 4+   Hip IR 4 4   Quads (L2-4) 3+ 3-   Hamstrings 5 5   Ankle Dorsiflexion (L4-5) 4- 4+   Ankle Plantarflexion (S1-2)       Ankle Inversion       Ankle Eversion (S1-2)       Great Toe Extension (L5)               Flexibility       Hamstrings (90/90) WFL WFL     RESTRICTIONS/PRECAUTIONS:   FALL RISK     Exercises/Interventions:     Therapeutic Exercises (56292) Resistance / level Sets/sec Reps Notes   Scifit X 5 mins       HS Stretch     IB, HR/TR  2 X 10  30\" x 2  9/22 TR difficult d/t hurt right big toe           T.G. Squats    9/28 Added, CGA/min A on/off   Step-Ups Fwd    Step-Downs  10/19 Added, with difficulty, use of // bars    Supine SLR Flexion 1# R  2# L 3 X 10  9/25 Added, already doing as part of HEP    SAQ  2 X 10 10/1 Added   Clamshells 9/22 Added   Bridging   2  X 10     LAQ 2# L  1# R 3 X 10 B 9/22 Added, partial range for RLE    CKC TKE  9/25 Added          Cable Column        Cable Walkouts 4-way   3 plates  X 3 each 1/41 Added                  [facility]:    Leg Press  HS Curls  Knee Ext      TRX Squats  Descended 14 + 14 steps step-to, CGA and SPC    60#, 80#  30#, 40#   2 x 10,1 x 10  1 x 10, 2 x 10        X 10   Added 11/10       Attempted, could not lift weight, switched back to LAQ with 1/2#                 Therapeutic Activities (18960)              Lateral Band Walk 10/9 Added           Sit to stands from edge of mat   X 8 10/5 Attempted, unable, try at future visits   10/16 with PT assist          Partial Mini Squats  10/9 Added, mod verbal/tactile cues for form           Gait training on grey mats in // bars    10/23 Added, unsupported    Ramps up/down with SPC by basketball court     10/26 Added  11/3  Descended forward, ascended backward  With [employer]          Resisted squat with pulldown at CC     10/30 tried, pt apprehensive                 Neuromuscular Re-ed (73158)       Airex Tandem Balance   9/22 Added   SLS Airex   9/25 Added, occ UE support    Wobbleboard carpet BOSU  Step-Ups     9/22 Added   10/5  Mild collapse RLE today   Hip Hikes, lateral (foot flat) 10/5 Added    Alt Toe taps standing on Airex  9/25 Added, unsupported    Shuttle Step-Up to [employer]  Shuttle Hip Abduction  Shuttle Marching      2   X 10 B  X 20 B Added 10/1          Biodex               1/2 wedge DF, PF static standing   10/19 Added    Hand to opp knee grey mat with ambulation    X 3 laps unsupported  10/23 Added                  Manual Intervention (95992)                                                     Pt. Education:  -patient educated on diagnosis, prognosis and expectations for rehab  -all patient questions were answered    Home Exercise Program:  Pt has handout including ankle pumps, seated Hip flexion, SLR, mini squats he is currently doing given by home health PT  Discussed and demonstrated this date      Therapeutic Exercise and NMR EXR  [] (38102) Provided verbal/tactile cueing for activities related to strengthening, flexibility, endurance, ROM for improvements in  [] LE / Lumbar: LE, proximal hip, and core control with self care, mobility, lifting, ambulation. [] UE / Cervical: cervical, postural, scapular, scapulothoracic and UE control with self care, reaching, carrying, lifting, house/yardwork, driving, computer work.  [] (21600) Provided verbal/tactile cueing for activities related to improving balance, coordination, kinesthetic sense, posture, motor skill, proprioception to assist with   [] LE / lumbar: LE, proximal hip, and core control in self care, mobility, lifting, ambulation and eccentric single leg control.    [] UE / cervical: cervical, scapular, scapulothoracic and UE control with self care, reaching, carrying, lifting, house/yardwork, driving, computer work.   [] (10126) Therapist is in constant attendance of 2 or more patients providing skilled therapy interventions, but not providing any significant amount of measurable one-on-one time to either patient, for improvements in  [] LE / lumbar: LE, proximal hip, and core control in self care, mobility, lifting, ambulation and eccentric single leg control. [] UE / cervical: cervical, scapular, scapulothoracic and UE control with self care, reaching, carrying, lifting, house/yardwork, driving, computer work. NMR and Therapeutic Activities:    [] (38923 or 44811) Provided verbal/tactile cueing for activities related to improving balance, coordination, kinesthetic sense, posture, motor skill, proprioception and motor activation to allow for proper function of   [] LE: / Lumbar core, proximal hip and LE with self care and ADLs  [] UE / Cervical: cervical, postural, scapular, scapulothoracic and UE control with self care, carrying, lifting, driving, computer work.   [] (12954) Gait Re-education- Provided training and instruction to the patient for proper LE, core and proximal hip recruitment and positioning and eccentric body weight control with ambulation re-education including up and down stairs     Home Management Training / Self Care:  [] (84860) Provided self-care/home management training related to activities of daily living and compensatory training, and/or use of adaptive equipment for improvement with: ADLs and compensatory training, meal preparation, safety procedures and instruction in use of adaptive equipment, including bathing, grooming, dressing, personal hygiene, basic household cleaning and chores.      Home Exercise Program:    [x] (79326) Reviewed/Progressed HEP activities related to strengthening, flexibility, endurance, ROM of   [] LE / Lumbar: core, proximal hip and LE for functional self-care, mobility, lifting and ambulation/stair navigation   [] UE / Cervical: cervical, postural, scapular, scapulothoracic and UE control with self care, reaching, carrying, lifting, house/yardwork, driving, computer work  [] (32015)Reviewed/Progressed HEP activities related to improving balance, coordination, kinesthetic sense, posture, motor skill, proprioception of   [] LE: core, proximal hip and LE for self care, mobility, lifting, and ambulation/stair navigation    [] UE / Cervical: cervical, postural,  scapular, scapulothoracic and UE control with self care, reaching, carrying, lifting, house/yardwork, driving, computer work    Manual Treatments:  PROM / STM / Oscillations-Mobs:  G-I, II, III, IV (PA's, Inf., Post.)  [] (79590) Provided manual therapy to mobilize LE, proximal hip and/or LS spine soft tissue/joints for the purpose of modulating pain, promoting relaxation,  increasing ROM, reducing/eliminating soft tissue swelling/inflammation/restriction, improving soft tissue extensibility and allowing for proper ROM for normal function with   [] LE / lumbar: self care, mobility, lifting and ambulation. [] UE / Cervical: self care, reaching, carrying, lifting, house/yardwork, driving, computer work. Modalities:  [] (45007) Vasopneumatic compression: Utilized vasopneumatic compression to decrease edema / swelling for the purpose of improving mobility and quad tone / recruitment which will allow for increased overall function including but not limited to self-care, transfers, ambulation, and ascending / descending stairs. Modalities:      Charges:  Timed Code Treatment Minutes: 43   Total Treatment Minutes: 43     [] EVAL - LOW (05904)   [] EVAL - MOD (23028)  [] EVAL - HIGH (68851)  [] RE-EVAL (25626)  [x] RQ(64190) x 2      [] Ionto  [] NMR (91498) x 1       [] Vaso  [] Manual (85802) x       [] Ultrasound  [x] TA x 1       [] Mech Traction (24418)  [] Aquatic Therapy x      [] ES (un) (73670):   [] Home Management Training x  [] ES(attended) (40464)   [] Dry Needling 1-2 muscles (04619):  [] Dry Needling 3+ muscles (896829)  [] Group:      [] Other:     GOALS: Patient stated goal: strengthen legs to do stuff, such as golfing if able   [x]? Progressing: []? Met: []? Not Met: []?  Adjusted     Therapist goals for Patient: Short Term Goals: To be achieved in: 2 weeks  1. Independent in HEP and progression per patient tolerance, in order to prevent re-injury. [x]? Progressing: []? Met: []? Not Met: []? Adjusted  2. Patient will have a decrease in pain to facilitate improvement in movement, function, and ADLs as indicated by Functional Deficits. [x]? Progressing: []? Met: []? Not Met: []? Adjusted     Long Term Goals: To be achieved in: 6 weeks  1. Disability index score of 30% or less for the LEFS to assist with reaching prior level of function. [x]? Progressing: []? Met: []? Not Met: []? Adjusted  3. Patient will demonstrate an increase in Strength to at least 4/5 in RLE as well as good proximal hip strength and control to allow for proper functional mobility as indicated by patients Functional Deficits. [x]? Progressing: []? Met: []? Not Met: []? Adjusted  4. Patient will return to functional activities including ambulating 3-4 blocks or around [facility] by patient's home without increased symptoms or restriction to resume PLOF--Said he hasn't tried to walk around the mall yet    [x]? Progressing: []? Met: []? Not Met: []? Adjusted  5. Pt will reduce risk for falls by improving semi-tandem balance to 30 secs/ NEW GOAL:   Pt will improve tandem balance to 30 secs and/or perform 30 sec sit to stand test with good mechanics to help reduce risk for falls   []? Progressing: [x]? Met: []? Not Met: [x]? Adjusted         Overall Progression Towards Functional goals/ Treatment Progress Update:  [x] Patient is progressing as expected towards functional goals listed. [] Progression is slowed due to complexities/Impairments listed. [] Progression has been slowed due to co-morbidities.   [] Plan just implemented, too soon to assess goals progression <30days   [] Goals require adjustment due to lack of progress  [] Patient is not progressing as expected and requires additional follow up with physician  [] Other    Persisting Functional Limitations/Impairments:  []Sleeping []Sitting               [x]Standing [x]Transfers        [x]Walking []Kneeling               [x]Stairs [x]Squatting / bending   []ADLs [x]Reaching  [x]Lifting  [x]Housework  []Driving []Job related tasks  []Sports/Recreation []Other:        ASSESSMENT:  Introduced increased resistance and functional training in [facility] today. Pt continues to have significant quad weakness, unable to lift lightest weight on machine. Attempted eccetric LAQ with 10# on machine, but had too much difficulty and discomofrt. Continues to display quad/hip weakness that gives pt difficulty with sit to stand transfers and leg buckling. Pt highly dependant on use of RW, and needed cues during functional steps and balancing activities to prevent full knee extension to help reduce buckling episode. Needs continued aggressive quad/hip strengthening to improve safety with transfers to allow pt to be able to get up and down from all chairs with improved efficiency in order to reduce fall risk. Treatment/Activity Tolerance:  [x] Patient able to complete tx  [] Patient limited by fatigue  [] Patient limited by pain  [] Patient limited by other medical complications  [] Other:     Prognosis: [x] Good [] Fair  [] Poor    Patient Requires Follow-up: [x] Yes  [] No    Plan for next treatment session:  Balance, LE strengthening    PLAN: See eval. PT 2x / week for 6 weeks. Cont 2x/week x 4-6 weeks    [x] Continue per plan of care [] Alter current plan (see comments)  [] Plan of care initiated [] Hold pending MD visit [] Discharge    Electronically signed by: Micha Edmond PT    Note: If patient does not return for scheduled/ recommended follow up visits, this note will serve as a discharge from care along with most recent update on progress.

## 2020-11-13 ENCOUNTER — HOSPITAL ENCOUNTER (OUTPATIENT)
Dept: PHYSICAL THERAPY | Age: 80
Setting detail: THERAPIES SERIES
Discharge: HOME | End: 2020-11-13
Payer: MEDICARE

## 2020-11-13 PROCEDURE — 97112 NEUROMUSCULAR REEDUCATION: CPT

## 2020-11-13 PROCEDURE — 97110 THERAPEUTIC EXERCISES: CPT

## 2020-11-13 NOTE — FLOWSHEET NOTE
[address] [facility] Physical Therapy  Phone: [phone]   Fax: [phone]    Physical Therapy Daily Treatment Note  Date:  2020    Patient Name:  Yair Juares    :  1940  MRN: [id number]  Medical/Treatment Diagnosis Information:  · Diagnosis: Neuropathy G62.9  · Treatment Diagnosis: Impaired balance, decreased ankle/hip/knee strength and stability, increased fall risk  Insurance/Certification information:  PT Insurance Information: Medicare  Physician Information:  Referring Practitioner: Shahida Desir  Plan of care signed (Y/N): []  Yes [x]  No     Date of Patient follow up with Physician:      Progress Report: []  Yes  [x]  No     Date Range for reporting period:  Beginnin20  Ending:     Progress report due (10 Rx/or 30 days whichever is less): visit #20 or      Recertification due (POC duration/ or 90 days whichever is less):  POC duration      Visit # Insurance Allowable Auth required? Date Range    +   [place]8-12  [place] []  Yes  [x]  No       Latex Allergy:  [x]NO      []YES  Preferred Language for Healthcare:   [x]English       []other:    Functional Scale:        Date assessed:  LEFS: raw score = 28; dysfunction 60-79%   10/23     Pain level:  5/10  Medial portion of left knee. \"Throbbing toothache. \"      SUBJECTIVE:   No new changes         OBJECTIVE:     Balance:  Semi-tandem: 30 secs bilaterally; Tandem:  20 secs L, 30 secs left.         PROM AROM     L R L R   Hip Flexion           Hip Abduction           Hip ER           Hip IR           Knee Flexion WNL WNL       Knee Extension WNL WNL Limited by strength Limited by strength    Dorsiflexion            Plantarflexion            Inversion            Eversion                  Strength (0-5) / Myotomes Left Right   Hip Flexion - supine       Hip Flexion - seated (L1-2) 4+ 4-   Hip Abduction 4- 3+   Hip Adduction       Hip ER 4+ 4+   Hip IR 4 4   Quads (L2-4) 3+ 3-   Hamstrings 5 5   Ankle standing on Airex  9/25 Added, unsupported    Shuttle Step-Up to [employer]  Shuttle Hip Abduction  Shuttle Marching    2 X 10 B  X 10 B  X 20 B Added 10/1          Biodex               1/2 wedge DF, PF static standing   10/19 Added    Hand to opp knee grey mat with ambulation      10/23 Added                  Manual Intervention (30278)                                                     Pt. Education:  -patient educated on diagnosis, prognosis and expectations for rehab  -all patient questions were answered    Home Exercise Program:  Pt has handout including ankle pumps, seated Hip flexion, SLR, mini squats he is currently doing given by home health PT  Discussed and demonstrated this date      Therapeutic Exercise and NMR EXR  [] (86447) Provided verbal/tactile cueing for activities related to strengthening, flexibility, endurance, ROM for improvements in  [] LE / Lumbar: LE, proximal hip, and core control with self care, mobility, lifting, ambulation. [] UE / Cervical: cervical, postural, scapular, scapulothoracic and UE control with self care, reaching, carrying, lifting, house/yardwork, driving, computer work.  [] (08618) Provided verbal/tactile cueing for activities related to improving balance, coordination, kinesthetic sense, posture, motor skill, proprioception to assist with   [] LE / lumbar: LE, proximal hip, and core control in self care, mobility, lifting, ambulation and eccentric single leg control. [] UE / cervical: cervical, scapular, scapulothoracic and UE control with self care, reaching, carrying, lifting, house/yardwork, driving, computer work.   [] (26890) Therapist is in constant attendance of 2 or more patients providing skilled therapy interventions, but not providing any significant amount of measurable one-on-one time to either patient, for improvements in  [] LE / lumbar: LE, proximal hip, and core control in self care, mobility, lifting, ambulation and eccentric single leg control.    [] UE / cervical: cervical, scapular, scapulothoracic and UE control with self care, reaching, carrying, lifting, house/yardwork, driving, computer work. NMR and Therapeutic Activities:    [] (43090 or 06365) Provided verbal/tactile cueing for activities related to improving balance, coordination, kinesthetic sense, posture, motor skill, proprioception and motor activation to allow for proper function of   [] LE: / Lumbar core, proximal hip and LE with self care and ADLs  [] UE / Cervical: cervical, postural, scapular, scapulothoracic and UE control with self care, carrying, lifting, driving, computer work.   [] (70524) Gait Re-education- Provided training and instruction to the patient for proper LE, core and proximal hip recruitment and positioning and eccentric body weight control with ambulation re-education including up and down stairs     Home Management Training / Self Care:  [] (70221) Provided self-care/home management training related to activities of daily living and compensatory training, and/or use of adaptive equipment for improvement with: ADLs and compensatory training, meal preparation, safety procedures and instruction in use of adaptive equipment, including bathing, grooming, dressing, personal hygiene, basic household cleaning and chores.      Home Exercise Program:    [x] (41382) Reviewed/Progressed HEP activities related to strengthening, flexibility, endurance, ROM of   [] LE / Lumbar: core, proximal hip and LE for functional self-care, mobility, lifting and ambulation/stair navigation   [] UE / Cervical: cervical, postural, scapular, scapulothoracic and UE control with self care, reaching, carrying, lifting, house/yardwork, driving, computer work  [] (76072)Reviewed/Progressed HEP activities related to improving balance, coordination, kinesthetic sense, posture, motor skill, proprioception of   [] LE: core, proximal hip and LE for self care, mobility, lifting, and ambulation/stair navigation    [] UE / Cervical: cervical, postural,  scapular, scapulothoracic and UE control with self care, reaching, carrying, lifting, house/yardwork, driving, computer work    Manual Treatments:  PROM / STM / Oscillations-Mobs:  G-I, II, III, IV (PA's, Inf., Post.)  [] (75624) Provided manual therapy to mobilize LE, proximal hip and/or LS spine soft tissue/joints for the purpose of modulating pain, promoting relaxation,  increasing ROM, reducing/eliminating soft tissue swelling/inflammation/restriction, improving soft tissue extensibility and allowing for proper ROM for normal function with   [] LE / lumbar: self care, mobility, lifting and ambulation. [] UE / Cervical: self care, reaching, carrying, lifting, house/yardwork, driving, computer work. Modalities:  [] (33426) Vasopneumatic compression: Utilized vasopneumatic compression to decrease edema / swelling for the purpose of improving mobility and quad tone / recruitment which will allow for increased overall function including but not limited to self-care, transfers, ambulation, and ascending / descending stairs. Modalities:      Charges:  Timed Code Treatment Minutes: 46   Total Treatment Minutes: 46     [] EVAL - LOW (58184)   [] EVAL - MOD (80870)  [] EVAL - HIGH (09610)  [] RE-EVAL (60539)  [x] NX(86656) x 2      [] Ionto  [x] NMR (93019) x 1       [] Vaso  [] Manual (42305) x       [] Ultrasound  [] TA x 1       [] Mech Traction (10528)  [] Aquatic Therapy x      [] ES (un) (68494):   [] Home Management Training x  [] ES(attended) (64800)   [] Dry Needling 1-2 muscles (12570):  [] Dry Needling 3+ muscles (648657)  [] Group:      [] Other:     GOALS: Patient stated goal: strengthen legs to do stuff, such as golfing if able   [x]? Progressing: []? Met: []? Not Met: []? Adjusted     Therapist goals for Patient:   Short Term Goals: To be achieved in: 2 weeks  1. Independent in HEP and progression per patient tolerance, in order to prevent re-injury. [x]? Limitations/Impairments:  []Sleeping []Sitting               [x]Standing [x]Transfers        [x]Walking []Kneeling               [x]Stairs [x]Squatting / bending   []ADLs [x]Reaching  [x]Lifting  [x]Housework  []Driving []Job related tasks  []Sports/Recreation []Other:        ASSESSMENT:  Introduced increased resistance and functional training in [facility] today. Pt continues to have significant quad weakness, unable to lift lightest weight on machine. Attempted eccetric LAQ with 10# on machine, but had too much difficulty and discomofrt. Continues to display quad/hip weakness that gives pt difficulty with sit to stand transfers and leg buckling. Pt highly dependant on use of RW, and needed cues during functional steps and balancing activities to prevent full knee extension to help reduce buckling episode. Needs continued aggressive quad/hip strengthening to improve safety with transfers to allow pt to be able to get up and down from all chairs with improved efficiency in order to reduce fall risk. Treatment/Activity Tolerance:  [x] Patient able to complete tx  [] Patient limited by fatigue  [] Patient limited by pain  [] Patient limited by other medical complications  [] Other:     Prognosis: [x] Good [] Fair  [] Poor    Patient Requires Follow-up: [x] Yes  [] No    Plan for next treatment session:  Balance, LE strengthening    PLAN: See kadie. PT 2x / week for 6 weeks. Cont 2x/week x 4-6 weeks    [x] Continue per plan of care [] Alter current plan (see comments)  [] Plan of care initiated [] Hold pending MD visit [] Discharge    Electronically signed by: Fredi Wright PT    Note: If patient does not return for scheduled/ recommended follow up visits, this note will serve as a discharge from care along with most recent update on progress.

## 2020-11-17 ENCOUNTER — HOSPITAL ENCOUNTER (OUTPATIENT)
Dept: PHYSICAL THERAPY | Age: 80
Setting detail: THERAPIES SERIES
Discharge: HOME | End: 2020-11-17
Payer: MEDICARE

## 2020-11-17 PROCEDURE — 97112 NEUROMUSCULAR REEDUCATION: CPT

## 2020-11-17 PROCEDURE — 97110 THERAPEUTIC EXERCISES: CPT

## 2020-11-17 NOTE — FLOWSHEET NOTE
[address] [facility] Physical Therapy  Phone: [phone]   Fax: [phone]    Physical Therapy Daily Treatment Note  Date:  2020    Patient Name:  Chata Osborn    :  1940  MRN: [id number]  Medical/Treatment Diagnosis Information:  · Diagnosis: Neuropathy G62.9  · Treatment Diagnosis: Impaired balance, decreased ankle/hip/knee strength and stability, increased fall risk  Insurance/Certification information:  PT Insurance Information: Medicare  Physician Information:  Referring Practitioner: Isabel Giordano  Plan of care signed (Y/N): []  Yes [x]  No     Date of Patient follow up with Physician:      Progress Report: []  Yes  [x]  No     Date Range for reporting period:  Beginnin20  Ending:     Progress report due (10 Rx/or 30 days whichever is less): visit #20 or 85/95     Recertification due (POC duration/ or 90 days whichever is less):  POC duration      Visit # Insurance Allowable Auth required? Date Range    +   /8-12  [place] []  Yes  [x]  No       Latex Allergy:  [x]NO      []YES  Preferred Language for Healthcare:   [x]English       []other:    Functional Scale:        Date assessed:  LEFS: raw score = 28; dysfunction 60-79%   10/23     Pain level:  5/10  Medial portion of left knee. \"Throbbing toothache. \"      SUBJECTIVE:  Nothing new, continued problems with sit to stand         OBJECTIVE:     Balance:  Semi-tandem: 30 secs bilaterally; Tandem:  20 secs L, 30 secs left.         PROM AROM     L R L R   Hip Flexion           Hip Abduction           Hip ER           Hip IR           Knee Flexion WNL WNL       Knee Extension WNL WNL Limited by strength Limited by strength    Dorsiflexion            Plantarflexion            Inversion            Eversion                  Strength (0-5) / Myotomes Left Right   Hip Flexion - supine       Hip Flexion - seated (L1-2) 4+ 4-   Hip Abduction 4- 3+   Hip Adduction       Hip ER 4+ 4+   Hip IR 4 4   Quads (L2-4) 3+ 3-   Hamstrings 5 5   Ankle Dorsiflexion (L4-5) 4- 4+   Ankle Plantarflexion (S1-2)       Ankle Inversion       Ankle Eversion (S1-2)       Great Toe Extension (L5)               Flexibility       Hamstrings (90/90) WFL WFL     RESTRICTIONS/PRECAUTIONS:   FALL RISK     Exercises/Interventions:     Therapeutic Exercises (19923) Resistance / level Sets/sec Reps Notes   Scifit/Nustep  X 5 mins , Level 4       HS Stretch     IB, HR/TR  2 X 10  30\" x 2            T.G.  Squats       Step-Ups Fwd 4\"  X 10 B    Step-Downs  4\"  X 10 B 10/19 Added, with difficulty, use of // bars    Supine SLR Flexion 1# R  2# L 3 X 10  9/25 Added, already doing as part of HEP    SAQ  2 X 10 10/1 Added   Clamshells 9/22 Added   Bridging   3 X 10     LAQ 2# L  1# R 3 X 10 B 9/22 Added, partial range for RLE    CKC TKE  9/25 Added          Cable Column        Cable Walkouts 4-way     3 plates  X 3 each 5/41 Added                  [facility]:    Leg Press  HS Curls        TRX Squats      60#, 80#  30#, 40#, 50#   2 x 10,1 x 10  3 x 10         X 15   Added 11/10       Attempted, could not lift weight, switched back to LAQ with 1/2#                 Therapeutic Activities (55752)              Lateral Band Walk 10/9 Added           Sit to stands from edge of mat    10/5 Attempted, unable, try at future visits   10/16 with PT assist          Partial Mini Squats  10/9 Added, mod verbal/tactile cues for form           Gait training on grey mats in // bars    10/23 Added, unsupported    Ramps up/down with SPC by basketball court     10/26 Added  11/3  Descended forward, ascended backward  With [facility]          Resisted squat with pulldown at [facility]     10/30 tried, pt apprehensive                 Neuromuscular Re-ed (01150)       A[employer] Tandem Balance   9/22 Added   SLS Airex   9/25 Added, occ UE support    Wobbleboard carpet       BOSU  Step-Ups     9/22 Added   10/5  Mild collapse RLE today   Hip Hikes, lateral (foot flat) 10/5 Added    Alt Toe taps standing on A[employer]x  9/25 Added, unsupported    Shuttle Step-Up to [employer]  Shuttle Hip Abduction  Shuttle Marching  Added 10/1          Biodex               1/2 wedge DF, PF static standing   10/19 Added    Hand to opp knee grey mat with ambulation      10/23 Added                  Manual Intervention (96840)                                                     Pt. Education:  -patient educated on diagnosis, prognosis and expectations for rehab  -all patient questions were answered    Home Exercise Program:  Pt has handout including ankle pumps, seated Hip flexion, SLR, mini squats he is currently doing given by home health PT  Discussed and demonstrated this date      Therapeutic Exercise and NMR EXR  [] (66103) Provided verbal/tactile cueing for activities related to strengthening, flexibility, endurance, ROM for improvements in  [] LE / Lumbar: LE, proximal hip, and core control with self care, mobility, lifting, ambulation. [] UE / Cervical: cervical, postural, scapular, scapulothoracic and UE control with self care, reaching, carrying, lifting, house/yardwork, driving, computer work.  [] (82949) Provided verbal/tactile cueing for activities related to improving balance, coordination, kinesthetic sense, posture, motor skill, proprioception to assist with   [] LE / lumbar: LE, proximal hip, and core control in self care, mobility, lifting, ambulation and eccentric single leg control. [] UE / cervical: cervical, scapular, scapulothoracic and UE control with self care, reaching, carrying, lifting, house/yardwork, driving, computer work.   [] (41648) Therapist is in constant attendance of 2 or more patients providing skilled therapy interventions, but not providing any significant amount of measurable one-on-one time to either patient, for improvements in  [] LE / lumbar: LE, proximal hip, and core control in self care, mobility, lifting, ambulation and eccentric single leg control.    [] UE / cervical: cervical, scapular, scapulothoracic and UE control with self care, reaching, carrying, lifting, house/yardwork, driving, computer work. NMR and Therapeutic Activities:    [] (37902 or 36816) Provided verbal/tactile cueing for activities related to improving balance, coordination, kinesthetic sense, posture, motor skill, proprioception and motor activation to allow for proper function of   [] LE: / Lumbar core, proximal hip and LE with self care and ADLs  [] UE / Cervical: cervical, postural, scapular, scapulothoracic and UE control with self care, carrying, lifting, driving, computer work.   [] (54580) Gait Re-education- Provided training and instruction to the patient for proper LE, core and proximal hip recruitment and positioning and eccentric body weight control with ambulation re-education including up and down stairs     Home Management Training / Self Care:  [] (53549) Provided self-care/home management training related to activities of daily living and compensatory training, and/or use of adaptive equipment for improvement with: ADLs and compensatory training, meal preparation, safety procedures and instruction in use of adaptive equipment, including bathing, grooming, dressing, personal hygiene, basic household cleaning and chores.      Home Exercise Program:    [x] (58374) Reviewed/Progressed HEP activities related to strengthening, flexibility, endurance, ROM of   [] LE / Lumbar: core, proximal hip and LE for functional self-care, mobility, lifting and ambulation/stair navigation   [] UE / Cervical: cervical, postural, scapular, scapulothoracic and UE control with self care, reaching, carrying, lifting, house/yardwork, driving, computer work  [] (90625)Reviewed/Progressed HEP activities related to improving balance, coordination, kinesthetic sense, posture, motor skill, proprioception of   [] LE: core, proximal hip and LE for self care, mobility, lifting, and ambulation/stair navigation    [] UE / Cervical: cervical, postural,  scapular, scapulothoracic and UE control with self care, reaching, carrying, lifting, house/yardwork, driving, computer work    Manual Treatments:  PROM / STM / Oscillations-Mobs:  G-I, II, III, IV (PA's, Inf., Post.)  [] (69398) Provided manual therapy to mobilize LE, proximal hip and/or LS spine soft tissue/joints for the purpose of modulating pain, promoting relaxation,  increasing ROM, reducing/eliminating soft tissue swelling/inflammation/restriction, improving soft tissue extensibility and allowing for proper ROM for normal function with   [] LE / lumbar: self care, mobility, lifting and ambulation. [] UE / Cervical: self care, reaching, carrying, lifting, house/yardwork, driving, computer work. Modalities:  [] (18057) Vasopneumatic compression: Utilized vasopneumatic compression to decrease edema / swelling for the purpose of improving mobility and quad tone / recruitment which will allow for increased overall function including but not limited to self-care, transfers, ambulation, and ascending / descending stairs. Modalities:      Charges:  Timed Code Treatment Minutes: 46   Total Treatment Minutes: 46     [] EVAL - LOW (53767)   [] EVAL - MOD (27771)  [] EVAL - HIGH (39322)  [] RE-EVAL (92169)  [x] HI(98326) x 2      [] Ionto  [x] NMR (13702) x 1       [] Vaso  [] Manual (88321) x       [] Ultrasound  [] TA x 1       [] Mech Traction (20590)  [] Aquatic Therapy x      [] ES (un) (17834):   [] Home Management Training x  [] ES(attended) (71122)   [] Dry Needling 1-2 muscles (48568):  [] Dry Needling 3+ muscles (769048)  [] Group:      [] Other:     GOALS: Patient stated goal: strengthen legs to do stuff, such as golfing if able   [x]? Progressing: []? Met: []? Not Met: []? Adjusted     Therapist goals for Patient:   Short Term Goals: To be achieved in: 2 weeks  1. Independent in HEP and progression per patient tolerance, in order to prevent re-injury. [x]? Progressing: []?  Met: []? Not Met: []? Adjusted  2. Patient will have a decrease in pain to facilitate improvement in movement, function, and ADLs as indicated by Functional Deficits. [x]? Progressing: []? Met: []? Not Met: []? Adjusted     Long Term Goals: To be achieved in: 6 weeks  1. Disability index score of 30% or less for the LEFS to assist with reaching prior level of function. [x]? Progressing: []? Met: []? Not Met: []? Adjusted  3. Patient will demonstrate an increase in Strength to at least 4/5 in RLE as well as good proximal hip strength and control to allow for proper functional mobility as indicated by patients Functional Deficits. [x]? Progressing: []? Met: []? Not Met: []? Adjusted  4. Patient will return to functional activities including ambulating 3-4 blocks or around [facility] by patient's home without increased symptoms or restriction to resume PLOF--Said he hasn't tried [place] walk around the mall yet    [x]? Progressing: []? Met: []? Not Met: []? Adjusted  5. Pt will reduce risk for falls by improving semi-tandem balance to 30 secs/ NEW GOAL:   Pt will improve tandem balance to 30 secs and/or perform 30 sec sit to stand test with good mechanics to help reduce risk for falls   []? Progressing: [x]? Met: []? Not Met: [x]? Adjusted         Overall Progression Towards Functional goals/ Treatment Progress Update:  [x] Patient is progressing as expected towards functional goals listed. [] Progression is slowed due to complexities/Impairments listed. [] Progression has been slowed due to co-morbidities.   [] Plan just implemented, too soon to assess goals progression <30days   [] Goals require adjustment due to lack of progress  [] Patient is not progressing as expected and requires additional follow up with physician  [] Other    Persisting Functional Limitations/Impairments:  []Sleeping []Sitting               [x]Standing [x]Transfers        [x]Walking []Kneeling               [x]Stairs [x]Squatting / bending   []ADLs [x]Reaching  [x]Lifting  [x]Housework  []Driving []Job related tasks  []Sports/Recreation []Other:        ASSESSMENT:  Continued with increased resistance and functional training in [facility] today. Pt continues to have significant quad weakness, unable to lift lightest weight on machine. Attempted eccetric LAQ with 10# on machine, but had too much difficulty and discomofrt. Continues to display quad/hip weakness that gives pt difficulty with sit to stand transfers and leg buckling. Pt highly dependant on use of RW, and needed cues during functional steps and balancing activities to prevent full knee extension to help reduce buckling episode. Needs continued aggressive quad/hip strengthening to improve safety with transfers to allow pt to be able to get up and down from all chairs with improved efficiency in order to reduce fall risk. Treatment/Activity Tolerance:  [x] Patient able to complete tx  [] Patient limited by fatigue  [] Patient limited by pain  [] Patient limited by other medical complications  [] Other:     Prognosis: [x] Good [] Fair  [] Poor    Patient Requires Follow-up: [x] Yes  [] No    Plan for next treatment session:  Balance, LE strengthening    PLAN: See eval. PT 2x / week for 6 weeks. Cont 2x/week x 4-6 weeks    [x] Continue per plan of care [] Alter current plan (see comments)  [] Plan of care initiated [] Hold pending MD visit [] Discharge    Electronically signed by: Layla Dunlap PT    Note: If patient does not return for scheduled/ recommended follow up visits, this note will serve as a discharge from care along with most recent update on progress.

## 2020-11-20 ENCOUNTER — HOSPITAL ENCOUNTER (OUTPATIENT)
Dept: PHYSICAL THERAPY | Age: 80
Setting detail: THERAPIES SERIES
Discharge: HOME | End: 2020-11-20
Payer: MEDICARE

## 2020-11-20 PROCEDURE — 97110 THERAPEUTIC EXERCISES: CPT

## 2020-11-20 PROCEDURE — G0283 ELEC STIM OTHER THAN WOUND: HCPCS

## 2020-11-20 PROCEDURE — 97140 MANUAL THERAPY 1/> REGIONS: CPT

## 2020-11-20 NOTE — FLOWSHEET NOTE
[facility] Physical Therapy  Phone: [phone]   Fax: [phone]    Physical Therapy Daily Treatment Note  Date:  2020    Patient Name:  Roma Mcguire    :  1940  MRN: [id number]  Medical/Treatment Diagnosis Information:  · Diagnosis: Neuropathy G62.9  · Treatment Diagnosis: Impaired balance, decreased ankle/hip/knee strength and stability, increased fall risk  Insurance/Certification information:  PT Insurance Information: Medicare  Physician Information:  Referring Practitioner: Tommy Gutierrez  Plan of care signed (Y/N): []  Yes [x]  No     Date of Patient follow up with Physician:      Progress Report: []  Yes  [x]  No     Date Range for reporting period:  Beginnin20  Ending:     Progress report due (10 Rx/or 30 days whichever is less): visit #20 or      Recertification due (POC duration/ or 90 days whichever is less):  POC duration      Visit # Insurance Allowable Auth required? Date Range    +   /8-12  [place] []  Yes  [x]  No       Latex Allergy:  [x]NO      []YES  Preferred Language for Healthcare:   [x]English       []other:    Functional Scale:        Date assessed:  LEFS: raw score = 28; dysfunction 60-79%   10/23     Pain level:  5/10  Medial portion of left knee. \"Throbbing toothache. \"  New 5/10 in low back     SUBJECTIVE:  Says is in a lot of back pain today,       OBJECTIVE:     Balance:  Semi-tandem: 30 secs bilaterally; Tandem:  20 secs L, 30 secs left.         PROM AROM     L R L R   Hip Flexion           Hip Abduction           Hip ER           Hip IR           Knee Flexion WNL WNL       Knee Extension WNL WNL Limited by strength Limited by strength    Dorsiflexion            Plantarflexion            Inversion            Eversion                  Strength (0-5) / Myotomes Left Right   Hip Flexion - supine       Hip Flexion - seated (L1-2) 4+ 4-   Hip Abduction 4- 3+   Hip Adduction       Hip ER 4+ 4+   Hip IR 4 4   Quads (L2-4) 3+ 3-   Hamstrings 5 5   Ankle Dorsiflexion (L4-5) 4- 4+   Ankle Plantarflexion (S1-2)       Ankle Inversion       Ankle Eversion (S1-2)       Great Toe Extension (L5)               Flexibility       Hamstrings (90/90) WFL WFL     RESTRICTIONS/PRECAUTIONS:   FALL RISK     Exercises/Interventions:     Therapeutic Exercises (53012) Resistance / level Sets/sec Reps Notes   Scifit/Nustep  X 5 mins , Level 4       HS Stretch     IB, HR/TR  2 X 10  30\" x 2            T.G.  Squats       Step-Ups Fwd 4\"  X 10 B    Step-Downs  4\"  X 10 B 10/19 Added, with difficulty, use of // bars    Supine SLR Flexion 1# R  2# L 3 X 10  9/25 Added, already doing as part of HEP    SAQ 2# 2 X 10 B 10/1 Added   Clamshells Lime 2 X 15  9/22 Added   Bridging  Lime green 3 X 10     LAQ 2# L  1# R 3 X 10 B 9/22 Added, partial range for RLE    CKC TKE  9/25 Added          Cable Column        Cable Walkouts 4-way     3 plates  X 3 each 5/12 Added                  [facility]:    Leg Press  HS Curls        TRX Squats    Added 11/10       Attempted, could not lift weight, switched back to LAQ with 1/2#                 LTR  SKTC  Seated trunk flexion edge of mat    X 20  10\" x 10  10\" x 10                                  Therapeutic Activities (74884)              Lateral Band Walk 10/9 Added           Sit to stands from edge of mat    10/5 Attempted, unable, try at future visits   10/16 with PT assist          Partial Mini Squats  10/9 Added, mod verbal/tactile cues for form           Gait training on grey mats in // bars    10/23 Added, unsupported    Ramps up/down with SPC by basketball court     10/26 Added  11/3  Descended forward, ascended backward  With [address]          Resisted squat with pulldown at [facility]     10/30 tried, pt apprehensive                 Neuromuscular Re-ed (81132)       Airex Tandem Balance   9/22 Added   SLS Airex   9/25 Added, occ UE support    Wobbleboard carpet       BOSU  Step-Ups     9/22 Added   10/5  Mild collapse RLE today   Hip Hikes, lateral (foot flat) 10/5 Added    Alt Toe taps standing on Airex  9/25 Added, unsupported    Shuttle Step-Up to [employer]  Shuttle Hip Abduction  Shuttle Marching  Added 10/1          Biodex               1/2 wedge DF, PF static standing   10/19 Added    Hand to opp knee grey mat with ambulation      10/23 Added                  Manual Intervention (59792)       STM lumbar paraspinals, hamstring/pifiromis stretch X 10 mins                                              Pt. Education:  -patient educated on diagnosis, prognosis and expectations for rehab  -all patient questions were answered    Home Exercise Program:  Pt has handout including ankle pumps, seated Hip flexion, SLR, mini squats he is currently doing given by home health PT  Discussed and demonstrated this date      Therapeutic Exercise and NMR EXR  [] (03713) Provided verbal/tactile cueing for activities related to strengthening, flexibility, endurance, ROM for improvements in  [] LE / Lumbar: LE, proximal hip, and core control with self care, mobility, lifting, ambulation. [] UE / Cervical: cervical, postural, scapular, scapulothoracic and UE control with self care, reaching, carrying, lifting, house/yardwork, driving, computer work.  [] (12061) Provided verbal/tactile cueing for activities related to improving balance, coordination, kinesthetic sense, posture, motor skill, proprioception to assist with   [] LE / lumbar: LE, proximal hip, and core control in self care, mobility, lifting, ambulation and eccentric single leg control.    [] UE / cervical: cervical, scapular, scapulothoracic and UE control with self care, reaching, carrying, lifting, house/yardwork, driving, computer work.   [] (41450) Therapist is in constant attendance of 2 or more patients providing skilled therapy interventions, but not providing any significant amount of measurable one-on-one time to either patient, for improvements in  [] LE / lumbar: LE, proximal hip, and core control in self care, mobility, lifting, ambulation and eccentric single leg control. [] UE / cervical: cervical, scapular, scapulothoracic and UE control with self care, reaching, carrying, lifting, house/yardwork, driving, computer work. NMR and Therapeutic Activities:    [] (76497 or 10100) Provided verbal/tactile cueing for activities related to improving balance, coordination, kinesthetic sense, posture, motor skill, proprioception and motor activation to allow for proper function of   [] LE: / Lumbar core, proximal hip and LE with self care and ADLs  [] UE / Cervical: cervical, postural, scapular, scapulothoracic and UE control with self care, carrying, lifting, driving, computer work.   [] (01046) Gait Re-education- Provided training and instruction to the patient for proper LE, core and proximal hip recruitment and positioning and eccentric body weight control with ambulation re-education including up and down stairs     Home Management Training / Self Care:  [] (67060) Provided self-care/home management training related to activities of daily living and compensatory training, and/or use of adaptive equipment for improvement with: ADLs and compensatory training, meal preparation, safety procedures and instruction in use of adaptive equipment, including bathing, grooming, dressing, personal hygiene, basic household cleaning and chores.      Home Exercise Program:    [x] (48145) Reviewed/Progressed HEP activities related to strengthening, flexibility, endurance, ROM of   [] LE / Lumbar: core, proximal hip and LE for functional self-care, mobility, lifting and ambulation/stair navigation   [] UE / Cervical: cervical, postural, scapular, scapulothoracic and UE control with self care, reaching, carrying, lifting, house/yardwork, driving, computer work  [] (05453)Reviewed/Progressed HEP activities related to improving balance, coordination, kinesthetic sense, posture, motor skill, proprioception of   [] LE: core, proximal hip and LE for self care, mobility, lifting, and ambulation/stair navigation    [] UE / Cervical: cervical, postural,  scapular, scapulothoracic and UE control with self care, reaching, carrying, lifting, house/yardwork, driving, computer work    Manual Treatments:  PROM / STM / Oscillations-Mobs:  G-I, II, III, IV (PA's, Inf., Post.)  [] (89301) Provided manual therapy to mobilize LE, proximal hip and/or LS spine soft tissue/joints for the purpose of modulating pain, promoting relaxation,  increasing ROM, reducing/eliminating soft tissue swelling/inflammation/restriction, improving soft tissue extensibility and allowing for proper ROM for normal function with   [] LE / lumbar: self care, mobility, lifting and ambulation. [] UE / Cervical: self care, reaching, carrying, lifting, house/yardwork, driving, computer work. Modalities:  [] (80711) Vasopneumatic compression: Utilized vasopneumatic compression to decrease edema / swelling for the purpose of improving mobility and quad tone / recruitment which will allow for increased overall function including but not limited to self-care, transfers, ambulation, and ascending / descending stairs. Modalities:  11/20 IFC to low back with HP and grey bolster x 15 mins     Charges:  Timed Code Treatment Minutes: 44   Total Treatment Minutes: 59     [] EVAL - LOW (87228)   [] EVAL - MOD (46527)  [] EVAL - HIGH (12412)  [] RE-EVAL (16237)  [x] ZZ(66844) x 2      [] Ionto  [] NMR (23380) x 1       [] Vaso  [x] Manual (63803) x       [] Ultrasound  [] TA x 1       [] Mech Traction (03843)  [] Aquatic Therapy x      [x] ES (un) (50347):   [] Home Management Training x  [] ES(attended) (18887)   [] Dry Needling 1-2 muscles (49665):  [] Dry Needling 3+ muscles (758061)  [] Group:      [] Other:     GOALS: Patient stated goal: strengthen legs to do stuff, such as golfing if able   [x]? Progressing: []? Met: []? Not Met: []?  Adjusted     Therapist goals for Patient:   Short Term Goals: To be achieved in: 2 weeks  1. Independent in HEP and progression per patient tolerance, in order to prevent re-injury. [x]? Progressing: []? Met: []? Not Met: []? Adjusted  2. Patient will have a decrease in pain to facilitate improvement in movement, function, and ADLs as indicated by Functional Deficits. [x]? Progressing: []? Met: []? Not Met: []? Adjusted     Long Term Goals: To be achieved in: 6 weeks  1. Disability index score of 30% or less for the LEFS to assist with reaching prior level of function. [x]? Progressing: []? Met: []? Not Met: []? Adjusted  3. Patient will demonstrate an increase in Strength to at least 4/5 in RLE as well as good proximal hip strength and control to allow for proper functional mobility as indicated by patients Functional Deficits. [x]? Progressing: []? Met: []? Not Met: []? Adjusted  4. Patient will return to functional activities including ambulating 3-4 blocks or around [facility] by patient's home without increased symptoms or restriction to resume PLOF--Said he hasn't tried to walk around the mall yet    [x]? Progressing: []? Met: []? Not Met: []? Adjusted  5. Pt will reduce risk for falls by improving semi-tandem balance to 30 secs/ NEW GOAL:   Pt will improve tandem balance to 30 secs and/or perform 30 sec sit to stand test with good mechanics to help reduce risk for falls   []? Progressing: [x]? Met: []? Not Met: [x]? Adjusted         Overall Progression Towards Functional goals/ Treatment Progress Update:  [x] Patient is progressing as expected towards functional goals listed. [] Progression is slowed due to complexities/Impairments listed. [] Progression has been slowed due to co-morbidities.   [] Plan just implemented, too soon to assess goals progression <30days   [] Goals require adjustment due to lack of progress  [] Patient is not progressing as expected and requires additional follow up with physician  []

## 2020-11-24 ENCOUNTER — HOSPITAL ENCOUNTER (OUTPATIENT)
Dept: PHYSICAL THERAPY | Age: 80
Setting detail: THERAPIES SERIES
Discharge: HOME | End: 2020-11-24
Payer: MEDICARE

## 2020-11-24 ENCOUNTER — OFFICE VISIT (OUTPATIENT)
Dept: ORTHOPEDIC SURGERY | Age: 80
End: 2020-11-24
Payer: MEDICARE

## 2020-11-24 VITALS — BODY MASS INDEX: 26.05 KG/M2 | WEIGHT: 182 LBS | TEMPERATURE: 98.3 F | HEIGHT: 70 IN

## 2020-11-24 PROCEDURE — 20610 DRAIN/INJ JOINT/BURSA W/O US: CPT | Performed by: ORTHOPAEDIC SURGERY

## 2020-11-24 PROCEDURE — G8427 DOCREV CUR MEDS BY ELIG CLIN: HCPCS | Performed by: ORTHOPAEDIC SURGERY

## 2020-11-24 PROCEDURE — G8484 FLU IMMUNIZE NO ADMIN: HCPCS | Performed by: ORTHOPAEDIC SURGERY

## 2020-11-24 PROCEDURE — G8417 CALC BMI ABV UP PARAM F/U: HCPCS | Performed by: ORTHOPAEDIC SURGERY

## 2020-11-24 PROCEDURE — 1123F ACP DISCUSS/DSCN MKR DOCD: CPT | Performed by: ORTHOPAEDIC SURGERY

## 2020-11-24 PROCEDURE — 4040F PNEUMOC VAC/ADMIN/RCVD: CPT | Performed by: ORTHOPAEDIC SURGERY

## 2020-11-24 PROCEDURE — 1036F TOBACCO NON-USER: CPT | Performed by: ORTHOPAEDIC SURGERY

## 2020-11-24 PROCEDURE — 99214 OFFICE O/P EST MOD 30 MIN: CPT | Performed by: ORTHOPAEDIC SURGERY

## 2020-11-24 RX ORDER — LIDOCAINE HYDROCHLORIDE 10 MG/ML
4 INJECTION, SOLUTION INFILTRATION; PERINEURAL ONCE
Status: COMPLETED | OUTPATIENT
Start: 2020-11-24 | End: 2020-11-24

## 2020-11-24 RX ORDER — NAPROXEN 500 MG/1
500 TABLET ORAL 2 TIMES DAILY WITH MEALS
Qty: 60 TABLET | Refills: 0 | Status: SHIPPED | OUTPATIENT
Start: 2020-11-24 | End: 2022-03-30 | Stop reason: ALTCHOICE

## 2020-11-24 RX ORDER — TRIAMCINOLONE ACETONIDE 40 MG/ML
40 INJECTION, SUSPENSION INTRA-ARTICULAR; INTRAMUSCULAR ONCE
Status: COMPLETED | OUTPATIENT
Start: 2020-11-24 | End: 2020-11-24

## 2020-11-24 RX ADMIN — LIDOCAINE HYDROCHLORIDE 4 ML: 10 INJECTION, SOLUTION INFILTRATION; PERINEURAL at 11:34

## 2020-11-24 RX ADMIN — TRIAMCINOLONE ACETONIDE 40 MG: 40 INJECTION, SUSPENSION INTRA-ARTICULAR; INTRAMUSCULAR at 11:35

## 2020-11-24 NOTE — PROGRESS NOTES
Physical Therapy  Cancellation/No-show Note  Patient Name:  Savilla Mcburney  :  1940   Date:  2020  Cancelled visits to date: 1  No-shows to date: 0    Patient status for today's appointment patient:  [x]  Cancelled   []  Rescheduled appointment  []  No-show     Reason given by patient:  []  Patient ill  []  Conflicting appointment  []  No transportation    []  Conflict with work  []  No reason given  [x]  Other:     Comments: fell last night       Phone call information:   []  Phone call made today to patient at _ time at number provided:      []  Patient answered, conversation as follows:    []  Patient did not answer, message left as follows:  [x]  Phone call not made today    Electronically signed by:  Kaycee Edwards PT

## 2020-11-24 NOTE — PROGRESS NOTES
CHIEF COMPLAINT:   1- Bilateral R>L knee weakness and pain/ extension lag bilateral knees/ neuropathy. 2- Bilateral hand numbness/ neuropathy, CTS. 3- Left thumb sticking/ trigger thumb-new   4- Bilateral knee pain/contusion, osteoarthritis-new fall  5- Left shoulder pain/rotator cuff arthropathy-new    HISTORY:  Mr. Lashaun Loya 78 y.o.  male well known to me presents today for f/u evaluation of bilateral knee extension lag R>L which started over 10 years ago.  He is still complaining of dull pain 4/10. Pain is increase with standing and walking and decrease with rest. He feels his legs weak and knees wing sometimes, dull achy pain by the end of the day. Alleviating factors: rest. No radiation and no numbness and tingling sensation. He states he is having difficulty straightening his right leg on his own. He states he had a new fall last night where he landed on his knees and caught himself with his left arm. He states he has been having issues with falling due to his neuropathy in the feet. The patient is known to me for right lateral ankle ATFL ankle sprain. He is also here with complaints of left shoulder pain as he had a new fall last night and caught himself with his left shoulder. He stated he did fall on his left shoulder. Pain is worse when trying to lift overhead pushing off of a chair and has no pain with rest.  Rates pain a 8/10 VAS in the left shoulder. He also c/o bilateral hand numbness that has been worsening over the past few years and worsening. Pain wakes him at night. His entire hand falls asleep and he can feel it all the way up to bilateral elbows. Both of his hands and elbows are about the same with not one worse than the other. He works a lot with his hands. He also states that his left thumb gets stuck down near his palm and he has to pull it back into position. This is been worsening over the past few weeks. He denies any treatment for this problem.   At his last visit he was sent for an EMG of his bilateral upper extremities and he is here today to review that. Denies smoking.       Past Medical History:   Diagnosis Date    Anxiety     Arthritis     Diabetes mellitus ([address]     High blood pressure     [place] (hard of hearing)     Hyperlipidemia     Irregular heart beat     Wears glasses     Wears hearing aid in both ears     Wears partial dentures        Past Surgical History:   Procedure Laterality Date    COLONOSCOPY      CYST REMOVAL Left 5/21/15    FOOT SURGERY Left     cyst removed from foot    JOINT REPLACEMENT Right     shoulder    LUMBAR SPINE SURGERY Bilateral 2019    BILATERAL L4 TRANSFORAMINAL EPIDURAL STEROID INJECTION WITH FLUOROSCOPY performed by Goldy Mccarthy MD at [address] [place] [address] Bilateral 2019    BILATERAL L4 TRANSFORAMINAL EPIDURAL STEROID INJECTION WITH FLUOROSCOPY performed by Goldy Mccarthy MD at [address] [place] [address] N/A 10/14/2019    MICROLUMBAR LAMINECTOMY L4-5, 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, performed by Paul Leonardo MD at [address] [place] [address]      tumor removed from neck    SHOULDER ARTHROPLASTY Right 2/17/15    SHOULDER SURGERY Bilateral     WRIST SURGERY         Social History     Socioeconomic History    Marital status:      Spouse name: Not on file    Number of children: 3    Years of education: Not on file    Highest education level: Not on file   Occupational History    Occupation: Retired   Social Needs    Financial resource strain: Not on file    Food insecurity     Worry: Not on file     Inability: Not on file   [employer] needs     Medical: Not on file     Non-medical: Not on file   Tobacco Use    Smoking status: Former Smoker     Last attempt to quit: 1975     Years since quittin.5    Smokeless tobacco: Never Used   Substance and Sexual Activity    Alcohol use: No    Drug use: No    Sexual activity: Not Currently Lifestyle    Physical activity     Days per week: Not on file     Minutes per session: Not on file    Stress: Not on file   Relationships    Social connections     Talks on phone: Not on file     Gets together: Not on file     Attends Yazdanism service: Not on file     Active member of club or organization: Not on file     Attends meetings of clubs or organizations: Not on file     Relationship status: Not on file    Intimate partner violence     Fear of current or ex partner: Not on file     Emotionally abused: Not on file     Physically abused: Not on file     Forced sexual activity: Not on file   Other Topics Concern    Not on file   Social History Narrative    Not on file       Family History   Problem Relation Age of Onset    No Known Problems Mother     No Known Problems Father     Heart Disease Brother     Heart Disease Brother        Current Outpatient Medications on File Prior to Visit   Medication Sig Dispense Refill    rosuvastatin (CRESTOR) 20 MG tablet Take 20 mg by mouth every other day      empagliflozin (JARDIANCE) 10 MG tablet Take 10 mg by mouth daily      pioglitazone (ACTOS) 30 MG tablet Take 30 mg by mouth daily      atorvastatin (LIPITOR) 80 MG tablet Take 80 mg by mouth nightly      Multiple Vitamins-Minerals (MULTIVITAMIN ADULT PO) Take 1 tablet by mouth daily      zolpidem (AMBIEN) 10 MG tablet Take by mouth nightly.  ibuprofen (ADVIL;MOTRIN) 200 MG tablet Take 200 mg by mouth every 6 hours as needed for Pain      metFORMIN (GLUCOPHAGE) 1000 MG tablet Take 1,000 mg by mouth 2 times daily (with meals)      valsartan-hydrochlorothiazide (DIOVAN-HCT) 160-12.5 MG per tablet Take 1 tablet by mouth daily.  glipiZIDE (GLUCOTROL) 5 MG tablet Take 5 mg by mouth.  sertraline (ZOLOFT) 25 MG tablet Take 25 mg by mouth nightly.  Omega-3 Fatty Acids (FISH OIL) 1000 MG CPDR   Take 1,000 mg by mouth 2 times daily       aspirin 81 MG tablet Take 81 mg by mouth daily.  naproxen (NAPROSYN) 500 MG tablet Take 1 tablet by mouth 2 times daily (with meals) for 14 days 28 tablet 0     No current facility-administered medications on file prior to visit. Pertinent items are noted in HPI  Review of systems reviewed from Patient History Form dated on 8/12/2020 and available in the patient's chart under the Media tab. No change. PHYSICAL EXAMINATION:  Mr. Rosemarie Alba is a very pleasant 78 y.o.  male who presents today in no acute distress, awake, alert, and oriented. He is well dressed, nourished and  groomed. Patient with normal affect. Height is  5' 10\" (1.778 m), weight is 182 lb (82.6 kg), Body mass index is 26.11 kg/m². Resting respiratory rate is 16. Examination of the gait, showed that the patient walks heel-toe with a leg dragging type gait and a limp.  Examination of both knees showing decrease active extension ROM right knee, with about 15 degree lag bilateral, but full passive extension, mild crepitus, tenderness on medial joint line, stable to varus and valgus stress. He has intact sensation and good pedal pulses. He has good strength in 2 planes, and has mild tenderness on deep palpation over the medial joint line. Knee reflex 1+ bilaterally. Quad and patellar tendon are intact bilateral.    Examination for Carpal Tunnel Syndrome shows Carpal Tunnel Compression Test to be moderately positive on the right & moderately positive on the left. Phalen's Maneuver is moderately positive on the right & moderately positive on the left. The patient displays no baseline symptoms to potentially confound the exam.  The thenar musculature is not atrophied & weakened. On evaluation of the left shoulder, range of motion is 110 degree in flexion and 100 degree in abduction. There is positive impingement signs. Motor and sensation is intact and symmetric throughout the bilateral upper extremities in the median, ulnar and radial nerve distributions.   He has 2+ radial pulses bilaterally. There is no bruising or swelling left shoulder. IMAGING:  X-rays were taken in the office today, 3 views of the left shoulder, and showed no acute fracture. Moderate cuff arthropathy. Xray 3 views of the bilaterally knee was obtained today in the office and reviewed. These demonstrate moderate degenerative changes with narrowing of the joint space in the medial joint space compartment, subchondral sclerosis, and marginal osteophytosis. EMG bilateral LE dated 9/8/2020 was reviewed and showed Study is consistent with mild sensorimotor peripheral neuropathy most likely secondary to diabetes. Decreased insertional activity in right lumbar paraspinals most likely secondary to prior surgery. Decreased voluntary motor units of right vastus medialis is of uncertain significance, may be related to prior lumbar surgery or to the peripheral neuropathy. No evidence of an acute radiculopathy at this time. EMG dated 9/30/2020 the bilateral upper extremities were reviewed and showed moderately severe bilateral median nerve lesions at the wrist, carpal tunnel syndrome. Right side is slightly worse than the left. Bilateral ulnar nerve lesions at the elbow, the left side is more severely involved compared to the right. IMPRESSION:    1- Bilateral R>L knee weakness and pain/ extension lag bilateral knees/ neuropathy. 2- Bilateral hand numbness/ neuropathy, CTS. 3- Left thumb sticking/ trigger thumb-new   4- Bilateral knee pain/contusion, osteoarthritis-new fall  5- Left shoulder pain/rotator cuff arthropathy-new    PLAN:  I discussed with the patient the EMG findings and the treatment options including both surgical and non-surgical treatment. We recommended Quad exercises and stretching of the calf and hamstrings which was taught to the patient today. Referral to neurologist for his neuropathy.  I discussed with the patient that I think that he would really benefit from a course of physical therapy for further strengthening and stretching. An Rx for physical therapy was given to the patient. Follow-up in 6 weeks and will consider cortisone injection or proceeding with surgery as needed. For the shoulder: I discussed with the patient the treatment options including both surgical and non-surgical treatment. We recommended stretching exercises of the shoulder was taught to the patient today. He will take NSAIDS as needed. I believe he will benefit from cortisone injection left shoulder, and he agreed to have. PROCEDURE:    With the patient's permission, and under sterile condition, the left shoulder was prepared and draped with alcohol and sub-acromial space was injected with a mixture of 1 ml Kenalog 40mg and 4 ml of 1% lidocaine. The patient tolerated the procedure well. A band-aid was applied and the patient was advised to ice the shoulder for 15-20 minutes to relieve any injection site related pain. He reported a good improvement immediatly after the injection.     Chantelle Trejo MD

## 2020-11-27 ENCOUNTER — HOSPITAL ENCOUNTER (OUTPATIENT)
Dept: PHYSICAL THERAPY | Age: 80
Setting detail: THERAPIES SERIES
Discharge: HOME | End: 2020-11-27
Payer: MEDICARE

## 2020-11-27 PROBLEM — M19.012 PRIMARY OSTEOARTHRITIS OF LEFT SHOULDER: Status: ACTIVE | Noted: 2020-11-27

## 2020-11-27 PROBLEM — M25.512 ACUTE PAIN OF LEFT SHOULDER: Status: ACTIVE | Noted: 2020-11-27

## 2020-11-27 NOTE — PROGRESS NOTES
Physical Therapy  Cancellation/No-show Note  Patient Name:  Marciano Villafeurte  :  1940   Date:  2020  Cancelled visits to date: 2  No-shows to date: 0    Patient status for today's appointment patient:  [x]  Cancelled ,   []  Rescheduled appointment  []  No-show     Reason given by patient:  []  Patient ill  []  Conflicting appointment  []  No transportation    []  Conflict with work  []  No reason given  [x]  Other:     Comments: no reason given, left message on machine      Phone call information:   []  Phone call made today to patient at _ time at number provided:      []  Patient answered, conversation as follows:    []  Patient did not answer, message left as follows:  [x]  Phone call not made today    Electronically signed by:  Joe Moe PTA

## 2020-11-30 ENCOUNTER — OFFICE VISIT (OUTPATIENT)
Dept: ORTHOPEDIC SURGERY | Age: 80
End: 2020-11-30
Payer: MEDICARE

## 2020-11-30 VITALS — TEMPERATURE: 97.8 F

## 2020-11-30 PROCEDURE — 1123F ACP DISCUSS/DSCN MKR DOCD: CPT | Performed by: PHYSICIAN ASSISTANT

## 2020-11-30 PROCEDURE — G8484 FLU IMMUNIZE NO ADMIN: HCPCS | Performed by: PHYSICIAN ASSISTANT

## 2020-11-30 PROCEDURE — 99214 OFFICE O/P EST MOD 30 MIN: CPT | Performed by: PHYSICIAN ASSISTANT

## 2020-11-30 PROCEDURE — 1036F TOBACCO NON-USER: CPT | Performed by: PHYSICIAN ASSISTANT

## 2020-11-30 PROCEDURE — G8417 CALC BMI ABV UP PARAM F/U: HCPCS | Performed by: PHYSICIAN ASSISTANT

## 2020-11-30 PROCEDURE — 4040F PNEUMOC VAC/ADMIN/RCVD: CPT | Performed by: PHYSICIAN ASSISTANT

## 2020-11-30 PROCEDURE — G8427 DOCREV CUR MEDS BY ELIG CLIN: HCPCS | Performed by: PHYSICIAN ASSISTANT

## 2020-12-01 ENCOUNTER — HOSPITAL ENCOUNTER (OUTPATIENT)
Dept: PHYSICAL THERAPY | Age: 80
Setting detail: THERAPIES SERIES
Discharge: HOME | End: 2020-12-01
Payer: MEDICARE

## 2020-12-01 ENCOUNTER — OFFICE VISIT (OUTPATIENT)
Dept: ORTHOPEDIC SURGERY | Age: 80
End: 2020-12-01
Payer: MEDICARE

## 2020-12-01 VITALS — HEIGHT: 70 IN | TEMPERATURE: 98.2 F | BODY MASS INDEX: 26.05 KG/M2 | WEIGHT: 182 LBS

## 2020-12-01 PROCEDURE — G8417 CALC BMI ABV UP PARAM F/U: HCPCS | Performed by: ORTHOPAEDIC SURGERY

## 2020-12-01 PROCEDURE — 97535 SELF CARE MNGMENT TRAINING: CPT

## 2020-12-01 PROCEDURE — 1036F TOBACCO NON-USER: CPT | Performed by: ORTHOPAEDIC SURGERY

## 2020-12-01 PROCEDURE — 1123F ACP DISCUSS/DSCN MKR DOCD: CPT | Performed by: ORTHOPAEDIC SURGERY

## 2020-12-01 PROCEDURE — 99214 OFFICE O/P EST MOD 30 MIN: CPT | Performed by: ORTHOPAEDIC SURGERY

## 2020-12-01 PROCEDURE — 97110 THERAPEUTIC EXERCISES: CPT

## 2020-12-01 PROCEDURE — G8427 DOCREV CUR MEDS BY ELIG CLIN: HCPCS | Performed by: ORTHOPAEDIC SURGERY

## 2020-12-01 PROCEDURE — 4040F PNEUMOC VAC/ADMIN/RCVD: CPT | Performed by: ORTHOPAEDIC SURGERY

## 2020-12-01 PROCEDURE — G8484 FLU IMMUNIZE NO ADMIN: HCPCS | Performed by: ORTHOPAEDIC SURGERY

## 2020-12-01 NOTE — PROGRESS NOTES
Subjective:      Patient ID: Maribel Torre is a 78 y.o.  male. Chief Complaint   Patient presents with    Back Pain        HPI:   He is here for an initial evaluation of right-sided low back pain and right quadriceps weakness. Symptoms began after a fall on 11/25/2020. He states he was attempting to get himself out of bed. His knees were not fully extended and he fell onto his bedroom floor. He does not recall striking any objects. He has now having a mild right-sided lower back pain. His biggest concern is bruising over the anterior thigh and inability to fully extend his right knee actively. He does have a history of a prior lumbar laminectomy 10/14/2019 by Demetria Finch. Pain is associated with:  Numbness: He denies numbness in the lower extremities. Tingling: He denies any tingling in the lower extremities. Perceived weakness: Bilateral legs. Difficulty controlling bowels: No.  Difficulty controlling bladder: Yes. Electrical shock sensation in her legs: Yes. Difficulty with balance while standing or walking: Yes. The following changes pain for the better: Sitting, rising from seated position, walking, lying down. The following changes pain for the worse: All other activities. .    The following treatment has been tried recently after his fall 2011 25 2020:  Physical therapy: Currently in physical therapy for lower extremity strengthening. Chiropractic treatment: No.  Epidural steroid injection/block: No.   Prescription medications: No.   Over-the-counter medications: Tylenol. Review of Systems:  Pertinent items are noted in HPI. A 14 point review of systems have been reviewed from Patient History Form dated 8/18/2020 as well as the Spine Form dated on 11/30/2020 and available in the patient's chart under the media tab.     Past Medical History:   Diagnosis Date    Anxiety     Arthritis     Diabetes mellitus ([facility] [address]     High blood pressure     [place] (hard of hearing)     Hyperlipidemia     Irregular heart beat     Wears glasses     Wears hearing aid in both ears     Wears partial dentures        Family History   Problem Relation Age of Onset    No Known Problems Mother     No Known Problems Father     Heart Disease Brother     Heart Disease Brother        Past Surgical History:   Procedure Laterality Date    COLONOSCOPY      CYST REMOVAL Left 5/21/15    FOOT SURGERY Left     cyst removed from foot    JOINT REPLACEMENT Right     shoulder    LUMBAR SPINE SURGERY Bilateral 2019    BILATERAL L4 TRANSFORAMINAL EPIDURAL STEROID INJECTION WITH FLUOROSCOPY performed by Mamie Torrez MD at [address] [place] [address] Bilateral 2019    BILATERAL L4 TRANSFORAMINAL EPIDURAL STEROID INJECTION WITH FLUOROSCOPY performed by Mamie Torrez MD at [address] [place] [address] N/A 10/14/2019    MICROLUMBAR LAMINECTOMY L4-5, 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, performed by Sanjuana Claude, MD at [address] [place] [address]      tumor removed from neck    SHOULDER ARTHROPLASTY Right 2/17/15    SHOULDER SURGERY Bilateral     WRIST SURGERY         Social History     Occupational History    Occupation: Retired   Tobacco Use    Smoking status: Former Smoker     Last attempt to quit: 1975     Years since quittin.5    Smokeless tobacco: Never Used   Substance and Sexual Activity    Alcohol use: No    Drug use: No    Sexual activity: Not Currently       Current Outpatient Medications   Medication Sig Dispense Refill    naproxen (NAPROSYN) 500 MG tablet Take 1 tablet by mouth 2 times daily (with meals) 60 tablet 0    rosuvastatin (CRESTOR) 20 MG tablet Take 20 mg by mouth every other day      empagliflozin (JARDIANCE) 10 MG tablet Take 10 mg by mouth daily      naproxen (NAPROSYN) 500 MG tablet Take 1 tablet by mouth 2 times daily (with meals) for 14 days 28 tablet 0    pioglitazone (ACTOS) 30 MG tablet Take 30 mg by mouth daily      atorvastatin (LIPITOR) 80 MG tablet Take 80 mg by mouth nightly      Multiple Vitamins-Minerals (MULTIVITAMIN ADULT PO) Take 1 tablet by mouth daily      zolpidem (AMBIEN) 10 MG tablet Take by mouth nightly.  ibuprofen (ADVIL;MOTRIN) 200 MG tablet Take 200 mg by mouth every 6 hours as needed for Pain      metFORMIN (GLUCOPHAGE) 1000 MG tablet Take 1,000 mg by mouth 2 times daily (with meals)      valsartan-hydrochlorothiazide (DIOVAN-HCT) 160-12.5 MG per tablet Take 1 tablet by mouth daily.  glipiZIDE (GLUCOTROL) 5 MG tablet Take 5 mg by mouth.  sertraline (ZOLOFT) 25 MG tablet Take 25 mg by mouth nightly.  Omega-3 Fatty Acids (FISH OIL) 1000 MG CPDR   Take 1,000 mg by mouth 2 times daily       aspirin 81 MG tablet Take 81 mg by mouth daily. No current facility-administered medications for this visit. Objective:     He is alert, oriented x 3, pleasant, well nourished, developed and in no acute distress. Temp 97.8 °F (36.6 °C) (Temporal)        Lumbar Spine Exam:  Deformity: Absent . Soft Tissue Swelling: Absent . Soft Tissue Tenderness: Absent . Midline Bone Tenderness:Absent . Paraspinal Muscular Spasm: Present. Previous Incisions: Present. Erythema Absent . Lumbar Flexion does produce pain. Lumbar Extension does produce pain. Motor Exam:                             Strength Scale: 1-5   Extensor Hallucis Longus L5 Anterior Tibialis   L4 Quadriceps   L2,3,4    Right             4+            4+         3   Left             4+            4+         4+     Reflex Exam: 0-4+   Achilles Tendon (gastroc)   S1 Patellar Tendon (quads)   L4   Right                 0              0   Left                 0              0     Sensory Exam: Decreased sensation noted in the left and right lower extremity. Sandoval Garcia     Special Testing: N= Negative  P= Positive   Straight leg raise Reverse straight leg raise Contralateral straight leg raise Log roll Boni test (ISAI) Babinski Barrera's  test   Right     N     N     N     N     N      N   Left     N     N     N     N     N      N       Gait not evaluated due to right quad weakness. Left hip exam:  There is no deformity. There is no pain with internal and external rotation. There is no pain with flexion and extension. ROM- diminished range of motion. Trochanteric region is not tender to palpation. There is no pain with weight bearing. Right hip exam:  There is no deformity. There is no pain with internal and external rotation. There is no pain with flexion and extension. ROM- diminished range of motion. Trochanteric region is not tender to palpation. There is no pain with weight bearing. He is noted to have ecchymosis over the right anterior thigh and swelling within the subcuticular tissue or muscle. Most likely hematoma. Vascular exam:  Examination of bilateral lower extremities:   Pallor or Rubor: absent. Digits are warm to touch, capillary refill is less than 2 seconds. There is No edema noted. Skin exam:  Examination of the skin over both lower extremities reveals: The skin to be intact without lacerations or abrasions. No significant erythema. No rashes or skin lesions. X Rays: performed in the office today:   AP and Lateral Lumbar Spine Radiographs: There is moderate Degenerative Disc Disease. There is moderate Facet Arthropathy. There is no Spondylolisthesis. There is no Compression Fractures. AP and Lateral views of the right Femur:  No acute fracture. AP Pelvis:  Mild degenerative changes. No acute fractures. Additional tests reviewed:  NONE      Diagnosis:        ICD-10-CM    1. Spinal stenosis of lumbar region with neurogenic claudication  M48.062 XR LUMBAR SPINE (2-3 VIEWS)   2. Right leg pain  M79.604 XR FEMUR RIGHT (MIN 2 VIEWS)     XR PELVIS (1-2 VIEWS)   3.  Rupture quadriceps tendon, right, initial encounter  S76.111A Walker Medline        Assessment/ Plan: Low back pain from recent fall. More concerning is right quad weakness with associated ecchymosis and probable hematoma concerning for partial quadriceps tear. The natural history of the patient's diagnosis as well as the treatment options were discussed in full and questions were answered. Risks and benefits of the treatment options also reviewed in detail. Proper lifting/bending technique discussed  Stretching exercises discussed with patient  Avoidance of exacerbating activities advised  Physical therapy to be continued. Walker to assist with ambulation due to right quad weakness. He may be weightbearing as tolerated. He is at increased risk for falls due to quadriceps weakness. Procedures    Walker Medline     Patient was prescribed a Walker. This mobility device is required for the following reasons:    Patient has mobility limitations that significantly impair their ability to participate in one or more mobility related activities of daily living. The patient is able to safely use the mobility device. Functional mobility deficit can be sufficiently resolved with the use of this device. Verbal and written instructions for the use of and application of this item were provided. The patient was educated and fit by a healthcare professional with expert knowledge and specialization in brace application while under the direct supervision of the treating physician. They were instructed to contact the office immediately should the equipment result in increased pain, decreased sensation, increased swelling or worsening of the condition. Follow Up: 2 weeks. Call or return to clinic prn if these symptoms worsen or fail to improve as anticipated.

## 2020-12-01 NOTE — PROGRESS NOTES
CHIEF COMPLAINT:   1- Bilateral R>L knee weakness and pain/ extension lag bilateral knees/ neuropathy. 2- Bilateral hand numbness/ neuropathy, CTS. 3- Left thumb sticking/ trigger thumb-new   4- Bilateral knee pain/contusion, osteoarthritis-new fall, 11/25/2020  5- Left shoulder pain/rotator cuff arthropathy-new  6- Left shoulder pain/new fall, 11/25/2020    HISTORY:  Mr. Rosa Luna 78 y.o.  male well known to me presents today for f/u evaluation of bilateral knee extension lag R>L which started over 10 years ago and was worse since he had a new fall on 11/25/2020.  He is still complaining of dull pain 4/10. Pain is increase with standing and walking and decrease with rest. He feels his legs weak and knees wing sometimes, dull achy pain by the end of the day. Alleviating factors: rest. No radiation and no numbness and tingling sensation. He states he is having difficulty straightening his right leg on his own. He states he had a new fall last night where he landed on his knees and caught himself with his left arm. He states he has been having issues with falling due to his neuropathy in the feet. The patient is known to me for right lateral ankle ATFL ankle sprain. He is also here with complaints of left shoulder pain as he had a new fall 11/25/2020 and caught himself with his left shoulder. He stated he did fall on his left shoulder. Pain is worse when trying to lift overhead pushing off of a chair and has no pain with rest.  Rates pain a 8/10 VAS in the left shoulder. He had a cortisone injection in his left shoulder on 11/24/2020 and improvement immediately after the injection was given, but now the pain is worse since his fall. He also c/o bilateral hand numbness that has been worsening over the past few years and worsening. Pain wakes him at night. His entire hand falls asleep and he can feel it all the way up to bilateral elbows.   Both of his hands and elbows are about the same with not one worse than the other. He works a lot with his hands. He also states that his left thumb gets stuck down near his palm and he has to pull it back into position. This is been worsening over the past few weeks. He denies any treatment for this problem. At his last visit he was sent for an EMG of his bilateral upper extremities and he is here today to review that. Denies smoking.       Past Medical History:   Diagnosis Date    Anxiety     Arthritis     Diabetes mellitus ([address]     High blood pressure     [place] (hard of hearing)     Hyperlipidemia     Irregular heart beat     Wears glasses     Wears hearing aid in both ears     Wears partial dentures        Past Surgical History:   Procedure Laterality Date    COLONOSCOPY      CYST REMOVAL Left 5/21/15    FOOT SURGERY Left     cyst removed from foot    JOINT REPLACEMENT Right     shoulder    LUMBAR SPINE SURGERY Bilateral 6/12/2019    BILATERAL L4 TRANSFORAMINAL EPIDURAL STEROID INJECTION WITH FLUOROSCOPY performed by Parul Vásquez MD at [address] [place] [address] Bilateral 6/26/2019    BILATERAL L4 TRANSFORAMINAL EPIDURAL STEROID INJECTION WITH FLUOROSCOPY performed by Parul Vásquez MD at [address] [place] [address] N/A 10/14/2019    MICROLUMBAR LAMINECTOMY L4-5, 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, performed by Marbella Hunt MD at [place] [address]      tumor removed from neck    SHOULDER ARTHROPLASTY Right 2/17/15    SHOULDER SURGERY Bilateral     WRIST SURGERY         Social History     Socioeconomic History    Marital status:      Spouse name: Not on file    Number of children: 3    Years of education: Not on file    Highest education level: Not on file   Occupational History    Occupation: Retired   Social Needs    Financial resource strain: Not on file    Food insecurity     Worry: Not on file     Inability: Not on file   [employer] needs     Medical: Not on file Non-medical: Not on file   Tobacco Use    Smoking status: Former Smoker     Last attempt to quit: 1975     Years since quittin.5    Smokeless tobacco: Never Used   Substance and Sexual Activity    Alcohol use: No    Drug use: No    Sexual activity: Not Currently   Lifestyle    Physical activity     Days per week: Not on file     Minutes per session: Not on file    Stress: Not on file   Relationships    Social connections     Talks on phone: Not on file     Gets together: Not on file     Attends Worship service: Not on file     Active member of club or organization: Not on file     Attends meetings of clubs or organizations: Not on file     Relationship status: Not on file    Intimate partner violence     Fear of current or ex partner: Not on file     Emotionally abused: Not on file     Physically abused: Not on file     Forced sexual activity: Not on file   Other Topics Concern    Not on file   Social History Narrative    Not on file       Family History   Problem Relation Age of Onset    No Known Problems Mother     No Known Problems Father     Heart Disease Brother     Heart Disease Brother        Current Outpatient Medications on File Prior to Visit   Medication Sig Dispense Refill    naproxen (NAPROSYN) 500 MG tablet Take 1 tablet by mouth 2 times daily (with meals) 60 tablet 0    rosuvastatin (CRESTOR) 20 MG tablet Take 20 mg by mouth every other day      empagliflozin (JARDIANCE) 10 MG tablet Take 10 mg by mouth daily      naproxen (NAPROSYN) 500 MG tablet Take 1 tablet by mouth 2 times daily (with meals) for 14 days 28 tablet 0    pioglitazone (ACTOS) 30 MG tablet Take 30 mg by mouth daily      atorvastatin (LIPITOR) 80 MG tablet Take 80 mg by mouth nightly      Multiple Vitamins-Minerals (MULTIVITAMIN ADULT PO) Take 1 tablet by mouth daily      zolpidem (AMBIEN) 10 MG tablet Take by mouth nightly.       ibuprofen (ADVIL;MOTRIN) 200 MG tablet Take 200 mg by mouth every 6 hours as needed for Pain      metFORMIN (GLUCOPHAGE) 1000 MG tablet Take 1,000 mg by mouth 2 times daily (with meals)      valsartan-hydrochlorothiazide (DIOVAN-HCT) 160-12.5 MG per tablet Take 1 tablet by mouth daily.  glipiZIDE (GLUCOTROL) 5 MG tablet Take 5 mg by mouth.  sertraline (ZOLOFT) 25 MG tablet Take 25 mg by mouth nightly.  Omega-3 Fatty Acids (FISH OIL) 1000 MG CPDR   Take 1,000 mg by mouth 2 times daily       aspirin 81 MG tablet Take 81 mg by mouth daily. No current facility-administered medications on file prior to visit. Pertinent items are noted in HPI  Review of systems reviewed from Patient History Form dated on 8/12/2020 and available in the patient's chart under the Media tab. No change. PHYSICAL EXAMINATION:  Mr. Yoni Leonardo is a very pleasant 78 y.o.  male who presents today in no acute distress, awake, alert, and oriented. He is well dressed, nourished and  groomed. Patient with normal affect. Height is  5' 10\" (1.778 m), weight is 182 lb (82.6 kg), Body mass index is 26.11 kg/m². Resting respiratory rate is 16. Examination of the gait, showed that the patient walks heel-toe with a leg dragging type gait and a limp.  Examination of both knees showing decrease active extension ROM right knee, with about 15 degree lag bilateral, but full passive extension, mild crepitus, tenderness on medial joint line, stable to varus and valgus stress. He has intact sensation and good pedal pulses. He has good strength in 2 planes, and has mild tenderness on deep palpation over the medial joint line. Knee reflex 1+ bilaterally. Quad and patellar tendon are intact bilateral.    Examination for Carpal Tunnel Syndrome shows Carpal Tunnel Compression Test to be moderately positive on the right & moderately positive on the left. Phalen's Maneuver is moderately positive on the right & moderately positive on the left.   The patient displays no baseline symptoms to potentially confound the exam.  The thenar musculature is not atrophied & weakened. On evaluation of the left shoulder, range of motion is 110 degree in flexion and 100 degree in abduction. There is positive impingement signs. Motor and sensation is intact and symmetric throughout the bilateral upper extremities in the median, ulnar and radial nerve distributions. He has 2+ radial pulses bilaterally. There is no bruising or swelling left shoulder. IMAGING:  X-rays were taken in the office today, 3 views of the left shoulder, and showed no acute fracture. Moderate cuff arthropathy. Xray 3 views of the bilaterally knee was obtained 11/24/2020 in the office and reviewed. These demonstrate moderate degenerative changes with narrowing of the joint space in the medial joint space compartment, subchondral sclerosis, and marginal osteophytosis. EMG bilateral LE dated 9/8/2020 was reviewed and showed Study is consistent with mild sensorimotor peripheral neuropathy most likely secondary to diabetes. Decreased insertional activity in right lumbar paraspinals most likely secondary to prior surgery. Decreased voluntary motor units of right vastus medialis is of uncertain significance, may be related to prior lumbar surgery or to the peripheral neuropathy. No evidence of an acute radiculopathy at this time. EMG dated 9/30/2020 the bilateral upper extremities were reviewed and showed moderately severe bilateral median nerve lesions at the wrist, carpal tunnel syndrome. Right side is slightly worse than the left. Bilateral ulnar nerve lesions at the elbow, the left side is more severely involved compared to the right. IMPRESSION:    1- Bilateral R>L knee weakness and pain/ extension lag bilateral knees/ neuropathy. 2- Bilateral hand numbness/ neuropathy, CTS.   3- Left thumb sticking/ trigger thumb-new   4- Bilateral knee pain/contusion, osteoarthritis-new fall, 11/25/2020  5- Left shoulder pain/rotator cuff arthropathy-new  6- Left shoulder pain/new fall, 11/25/2020    PLAN:  I discussed with the patient the EMG findings and the treatment options including both surgical and non-surgical treatment. We recommended Quad exercises and stretching of the calf and hamstrings which was taught to the patient today. Referral to neurologist for his neuropathy. I discussed with the patient that I think that he would really benefit from a course of physical therapy for further strengthening and stretching. An Rx for physical therapy was given to the patient. Follow-up in 6 weeks and will consider cortisone injection or proceeding with surgery as needed. For the shoulder: I discussed with the patient the treatment options including both surgical and non-surgical treatment. We recommended stretching exercises of the shoulder was taught to the patient today. He will take NSAIDS as needed. I discussed with the patient that I think that he would really benefit from a course of physical therapy for further strengthening and stretching. An Rx for physical therapy was given to the patient. Follow-up in 6 weeks.       Charan Krause MD

## 2020-12-01 NOTE — FLOWSHEET NOTE
[address] [facility] Physical Therapy  Phone: [phone]   Fax: [phone]    Physical Therapy Daily Treatment Note  Date:  2020    Patient Name:  Ailyn Gonzalez    :  1940  MRN: [id number]  Medical/Treatment Diagnosis Information:  · Diagnosis: Neuropathy G62.9  · Treatment Diagnosis: Impaired balance, decreased ankle/hip/knee strength and stability, increased fall risk  Insurance/Certification information:  PT Insurance Information: Medicare  Physician Information:  Referring Practitioner: Sugey Barahona  Plan of care signed (Y/N): []  Yes [x]  No     Date of Patient follow up with Physician:      Progress Report: []  Yes  [x]  No     Date Range for reporting period:  Beginnin20  Ending:     Progress report due (10 Rx/or 30 days whichever is less): visit # or      Recertification due (POC duration/ or 90 days whichever is less):  POC duration      Visit # Insurance Allowable Auth required? Date Range    +   /8-12  [place] []  Yes  [x]  No       Latex Allergy:  [x]NO      []YES  Preferred Language for Healthcare:   [x]English       []other:    Functional Scale:        Date assessed:  LEFS: raw score = 28; dysfunction 60-79%   10/23     Pain level:  5/10   L shoulder pain,  R quad;   0/10 in low back  Today. SUBJECTIVE:   Pt stated saw Dr. Lashay Desai and Miri Mccall  (Ortho at [facility]) and was advised that they were going to order PT for shoulder and R thigh. Pt also stated she collapsed to ground a couple of days ago when doing squats at counter as his legs gave out, was able to slow his descent with his hands on the counter. Did not get injured, but L sh and R quad more painful today. OBJECTIVE: :   Pt required mod - max  A to perform sit to stand today mostly due to L shoulder and R quad pain, and overall LE weakness. No orders in system yet for therapy. Advised pt. Dr. Lashay Desai wanted RTC in 6 weeks.             PROM AROM     L R L future visits   10/16 with PT assist          Partial Mini Squats  10/9 Added, mod verbal/tactile cues for form           Gait training on grey mats in // bars    10/23 Added, unsupported    Ramps up/down with SPC by [id number] court     10/26 Added  11/3  Descended forward, ascended backward  With [facility]              10/30 tried, pt apprehensive       10  10  10   Gravity Minimized due to wekaness in anti- gravity position          Neuromuscular Re-ed (00738)       Airex Tandem Balance   9/22 Added   SLS Airex   9/25 Added, occ UE support    Wobbleboard carpet       BOSU  Step-Ups     9/22 Added   10/5  Mild collapse RLE today   Hip Hikes, lateral (foot flat) 10/5 Added    Alt Toe taps standing on Airex  9/25 Added, unsupported    Shuttle Step-Up to [employer]  Shuttle Hip Abduction  Shuttle Marching  Added 10/1          Biodex               1/2 wedge DF, PF static standing   10/19 Added    Hand to opp knee grey mat with ambulation      10/23 Added                  Manual Intervention (87912)       STM lumbar paraspinals, hamstring/pifiromis stretch                                              Pt. Education: 12/1  Discussed with pt Importance of safety,  Pt does have raised toilet seat,  suggested he use to minimize stress on L shoulder as well as make sit to stand easier/safer. Also discussed getting electric recliner to assist with sit to stand    -patient educated on diagnosis, prognosis and expectations for rehab  -all patient questions were answered    Home Exercise Program:  Pt has handout including ankle pumps, seated Hip flexion, SLR, mini squats he is currently doing given by home health PT  Discussed and demonstrated this date      Therapeutic Exercise and NMR EXR  [x] (56976) Provided verbal/tactile cueing for activities related to strengthening, flexibility, endurance, ROM for improvements in  [x] LE / Lumbar: LE, proximal hip, and core control with self care, mobility, lifting, ambulation.   [] UE / Cervical: cervical, postural, scapular, scapulothoracic and UE control with self care, reaching, carrying, lifting, house/yardwork, driving, computer work.  [] (05280) Provided verbal/tactile cueing for activities related to improving balance, coordination, kinesthetic sense, posture, motor skill, proprioception to assist with   [] LE / lumbar: LE, proximal hip, and core control in self care, mobility, lifting, ambulation and eccentric single leg control. [] UE / cervical: cervical, scapular, scapulothoracic and UE control with self care, reaching, carrying, lifting, house/yardwork, driving, computer work.   [] (82384) Therapist is in constant attendance of 2 or more patients providing skilled therapy interventions, but not providing any significant amount of measurable one-on-one time to either patient, for improvements in  [] LE / lumbar: LE, proximal hip, and core control in self care, mobility, lifting, ambulation and eccentric single leg control. [] UE / cervical: cervical, scapular, scapulothoracic and UE control with self care, reaching, carrying, lifting, house/yardwork, driving, computer work.      NMR and Therapeutic Activities:    [] (17978 or 42040) Provided verbal/tactile cueing for activities related to improving balance, coordination, kinesthetic sense, posture, motor skill, proprioception and motor activation to allow for proper function of   [] LE: / Lumbar core, proximal hip and LE with self care and ADLs  [] UE / Cervical: cervical, postural, scapular, scapulothoracic and UE control with self care, carrying, lifting, driving, computer work.   [] (78612) Gait Re-education- Provided training and instruction to the patient for proper LE, core and proximal hip recruitment and positioning and eccentric body weight control with ambulation re-education including up and down stairs     Home Management Training / Self Care:  [x] (56830) Provided self-care/home management training related to activities of daily function including but not limited to self-care, transfers, ambulation, and ascending / descending stairs. Modalities:  11/20 IFC to low back with HP and grey bolster x 15 mins     Charges:  Timed Code Treatment Minutes: 50   Total Treatment Minutes: 50     [] EVAL - LOW (19123)   [] EVAL - MOD (75559)  [] EVAL - HIGH (69851)  [] RE-EVAL (76558)  [x] NU(21409) x 2      [] Ionto  [] NMR (52078) x 1       [] Vaso  [] Manual (43740) x       [] Ultrasound  [] TA x 1       [] Mech Traction (16372)  [] Aquatic Therapy x      [x] ES (un) (05318):   [x] Home Management Training x 1 [] ES(attended) (58780)   [] Dry Needling 1-2 muscles (44908):  [] Dry Needling 3+ muscles (821241)  [] Group:      [] Other:     GOALS: Patient stated goal: strengthen legs to do stuff, such as golfing if able   [x]? Progressing: []? Met: []? Not Met: []? Adjusted     Therapist goals for Patient:   Short Term Goals: To be achieved in: 2 weeks  1. Independent in HEP and progression per patient tolerance, in order to prevent re-injury. [x]? Progressing: []? Met: []? Not Met: []? Adjusted  2. Patient will have a decrease in pain to facilitate improvement in movement, function, and ADLs as indicated by Functional Deficits. [x]? Progressing: []? Met: []? Not Met: []? Adjusted     Long Term Goals: To be achieved in: 6 weeks  1. Disability index score of 30% or less for the LEFS to assist with reaching prior level of function. [x]? Progressing: []? Met: []? Not Met: []? Adjusted  3. Patient will demonstrate an increase in Strength to at least 4/5 in RLE as well as good proximal hip strength and control to allow for proper functional mobility as indicated by patients Functional Deficits. [x]? Progressing: []? Met: []? Not Met: []? Adjusted  4.  Patient will return to functional activities including ambulating 3-4 blocks or around [facility] by patient's home without increased symptoms or restriction to resume PLOF--Said he hasn't tried to walk around the mall yet    [x]? Progressing: []? Met: []? Not Met: []? Adjusted  5. Pt will reduce risk for falls by improving semi-tandem balance to 30 secs/ NEW GOAL:   Pt will improve tandem balance to 30 secs and/or perform 30 sec sit to stand test with good mechanics to help reduce risk for falls   []? Progressing: [x]? Met: []? Not Met: [x]? Adjusted         Overall Progression Towards Functional goals/ Treatment Progress Update:  [x] Patient is progressing as expected towards functional goals listed. [] Progression is slowed due to complexities/Impairments listed. [] Progression has been slowed due to co-morbidities. [] Plan just implemented, too soon to assess goals progression <30days   [] Goals require adjustment due to lack of progress  [] Patient is not progressing as expected and requires additional follow up with physician  [] Other    Persisting Functional Limitations/Impairments:  []Sleeping []Sitting               [x]Standing [x]Transfers        [x]Walking []Kneeling               [x]Stairs [x]Squatting / bending   []ADLs [x]Reaching  [x]Lifting  [x]Housework  []Driving []Job related tasks  []Sports/Recreation []Other:        ASSESSMENT:    Pt with significant difficulty with sit to stand,  L shoulder pain limiting his ability to push w L arm . Required mod - Max A x 1 for sit to stand from chair . He has Rx for walker,  No orders for PT for L shoulder injury. He has had additional falls, but is aware of safety issues.  Continue to focus on LE strengthening in gravity minimized position until can achieve 3/5 for Quad strength B           Treatment/Activity Tolerance:  [x] Patient able to complete tx  [] Patient limited by fatigue  [] Patient limited by pain  [] Patient limited by other medical complications  [] Other:     Prognosis: [x] Good [] Fair  [] Poor    Patient Requires Follow-up: [x] Yes  [] No    Plan for next treatment session:  Balance, LE strengthening    PLAN: See kadie. PT 2x / week for 6 weeks. Cont 2x/week x 4-6 weeks    [x] Continue per plan of care [] Alter current plan (see comments)  [] Plan of care initiated [] Hold pending MD visit [] Discharge    Electronically signed by: Natasha To PT [id number]      Note: If patient does not return for scheduled/ recommended follow up visits, this note will serve as a discharge from care along with most recent update on progress.

## 2020-12-04 ENCOUNTER — HOSPITAL ENCOUNTER (OUTPATIENT)
Dept: PHYSICAL THERAPY | Age: 80
Setting detail: THERAPIES SERIES
Discharge: HOME | End: 2020-12-04
Payer: MEDICARE

## 2020-12-04 PROCEDURE — 97110 THERAPEUTIC EXERCISES: CPT

## 2020-12-04 PROCEDURE — 97530 THERAPEUTIC ACTIVITIES: CPT

## 2020-12-04 NOTE — FLOWSHEET NOTE
[facility]  Phone: [phone]   Fax: [phone]     Physical Therapy Re-Certification Plan of Care    Dear Germán Ferreira,     We had the pleasure of treating the following patient for physical therapy services at [facility]. A summary of our findings can be found in the updated assessment below. This includes our plan of care. If you have any questions or concerns regarding these findings, please do not hesitate to contact me at the office phone number checked above. Thank you for the referral.     Physician Signature:________________________________Date:__________________  By signing above (or electronic signature), therapists plan is approved by physician      Functional Outcome:     12/4   Gait:  Ambulates with a RW for first time since fall  Transfers:   Mod A, max difficulty with max forward trunk lean to perform sit to/from stand    PROM AROM     L R L R   Hip Flexion           Hip Abduction           Hip ER           Hip IR           Knee Flexion WNL WNL WNL  WNL   Knee Extension WNL WNL 0 0   Shoulder Scaption     100* with pain       Shoulder Abd      n/t     Shoulder IR      WFL with pain       Shoulder ER      n/t            Strength (0-5) / Myotomes Left Right   Hip Flexion - supine       Hip Flexion - seated (L1-2) 4+ 4-   Hip Abduction 4- 3+   Hip Adduction       Hip ER 4+ 4+   Hip IR 4 4   Quads (L2-4) 3+ 3-   Hamstrings 5 5   Ankle Dorsiflexion (L4-5) 4- 4+   Shoulder Scaption 2+      Shoulder Abd       Shoulder ER  2-     Shoulder IR  3+             Flexibility       Hamstrings (90/90) WFL WFL       Overall Response to Treatment:   []Patient is responding well to treatment and improvement is noted with regards  to goals   []Patient should continue to improve in reasonable time if they continue HEP   []Patient has plateaued and is no longer responding to skilled PT intervention    []Patient is getting worse and would benefit from return to referring MD   []Patient unable to adhere to initial POC   [x]Other: Had a recent fall and a decline in status. Had f/u with MD who recommended continued therapy, as well as new orders to work on left shoulder that was injured since fall    Date range of Visits:  to    Total Visits: 20    Recommendation:    [x]Continue PT 2x / wk for 4-6 weeks. Will then add POC/visits once new eval/treat orders for left shoulder is completed next week     []Hold PT, pending MD visit    Physical Therapy Daily Treatment Note/Progress Report   Date:  2020    Patient Name:  Burt Alvarado    :  1940  MRN: [id number]  Medical/Treatment Diagnosis Information:  · Diagnosis: Neuropathy G62.9  · Treatment Diagnosis: Impaired balance, decreased ankle/hip/knee strength and stability, increased fall risk  Insurance/Certification information:  PT Insurance Information: Medicare  Physician Information:  Referring Practitioner: Germán Ferreira  Plan of care signed (Y/N): []  Yes [x]  No     Date of Patient follow up with Physician:      Progress Report: []  Yes  [x]  No     Date Range for reporting period:  Beginnin20  Endin20     Progress report due (10 Rx/or 30 days whichever is less): visit #10 or 1/4    Recertification due (POC duration/ or 90 days whichever is less):  POC duration      Visit # Insurance Allowable Auth required? Date Range    +   10/8-12   + 0/12  [place] []  Yes  [x]  No       Latex Allergy:  [x]NO      []YES  Preferred Language for Healthcare:   [x]English       []other:    Functional Scale:        Date assessed:  LEFS: raw score = 28; dysfunction 60-79%   10/23     Pain level:  5/10   L shoulder pain,  R quad;   0/10 in low back  Today. SUBJECTIVE:   Says that he has been doing some exercises at the house for his shoulder. Having more problems with sit to stand because he can't push up with his left shoulder. Has an evaluation set up here next week for shoulder, and will want to work on both at that time, and the leg still isn't good. PA said there isn't much they can do for the muscle that is messed up in the leg except for letting it heal.  Having a lot more trouble and now requires a RW since having his fall last week. Said the MD discussed a knee replacement as well. OBJECTIVE: 12/4  Pt required physical assist to lift RLE onto bike/nustep today  12/1:   Pt required mod - max  A to perform sit to stand today mostly due to L shoulder and R quad pain, and overall LE weakness. No orders in system yet for therapy. Advised pt. Dr. Kayla Keys wanted RTC in 6 weeks. 12/4   Gait:  Ambulates with a RW for first time since fall  Transfers:   Mod A, max difficulty with max forward trunk lean to perform sit to/from stand    PROM AROM     L R L R   Hip Flexion           Hip Abduction           Hip ER           Hip IR           Knee Flexion WNL WNL WNL  WNL   Knee Extension WNL WNL 0 0   Shoulder Scaption     100* with pain       Shoulder Abd      n/t     Shoulder IR      WFL with pain       Shoulder ER      n/t            Strength (0-5) / Myotomes Left Right   Hip Flexion - supine       Hip Flexion - seated (L1-2) 4+ 4-   Hip Abduction 4- 3+   Hip Adduction       Hip ER 4+ 4+   Hip IR 4 4   Quads (L2-4) 3+ 3-   Hamstrings 5 5   Ankle Dorsiflexion (L4-5) 4- 4+   Shoulder Scaption 2+      Shoulder Abd       Shoulder ER  2-     Shoulder IR  3+             Flexibility       Hamstrings (90/90) WFL WFL     RESTRICTIONS/PRECAUTIONS:   FALL RISK     Exercises/Interventions:     Therapeutic Exercises (53004) Resistance / level Sets/sec Reps Notes   Scifit/Nustep  X 5 mins , Level 4       HS Stretch  20\"  X 2     IB, HR/TR    Mini squat at counter  1    1 X 10  30\" x 2   10 12/1  placed chair behind pt,  suggested he do this when doing squat at home     Mat:  SL B  Hip flexion/Heel slides   Clamshell - pillow b/t knees  Knee exten - pillow b/t knees  Quad sets  '        10\"   X 10  X 10  X 10   x 15    T.G. Squats       Step-Ups Fwd 4\"  X 10 B    Step-Downs  4\"  X 10 B 10/19 Added, with difficulty, use of // bars    Supine SLR Flexion 9/25 Added, already doing as part of HEP    SAQ 10/1 Added   Clamshells hooklying  Lime9/22 Added   Bridging     LAQ 9/22 Added, partial range for RLE    CKC TKE  9/25 Added          Cable Column        Cable Walkouts 4-way  2.5   3 plates  X 3 each 7/50 Added                  [employer]:    Leg Press  HS Curls        TRX Squats    Added 11/10       Attempted, could not lift weight, switched back to LAQ with 1/2#                 LTR  SKTC  Seated trunk flexion edge of mat    X 20  20\" x 3  1                                 Therapeutic Activities (98176)              Lateral Band Walk 10/9 Added           Sit to stands from edge of mat    10/5 Attempted, unable, try at future visits   10/16 with PT assist          Partial Mini Squats  10/9 Added, mod verbal/tactile cues for form           Gait training on grey mats in // bars    10/23 Added, unsupported    Ramps up/down with SPC by basketball court     10/26 Added  11/3  Descended forward, ascended backward  With [employer]              10/30 tried, pt apprehensive       10  10  10   Gravity Minimized due to wekaness in anti- gravity position          Neuromuscular Re-ed (72965)       Airex Tandem Balance   9/22 Added   SLS Airex   9/25 Added, occ UE support    Wobbleboard carpet       BOSU  Step-Ups     9/22 Added   10/5  Mild collapse RLE today   Hip Hikes, lateral (foot flat) 10/5 Added    Alt Toe taps standing on Airex  9/25 Added, unsupported    Shuttle Step-Up to [employer]  Shuttle Hip Abduction  Shuttle Marching  Added 10/1          [employer]               1/2 wedge DF, PF static standing   10/19 Added    Hand to opp knee grey mat with ambulation      10/23 Added                  Manual Intervention (70125)       STM lumbar paraspinals, hamstring/pifiromis stretch                                              Pt. Education: 12/1  Discussed with pt Importance of safety,  Pt does have raised toilet seat,  suggested he use to minimize stress on L shoulder as well as make sit to stand easier/safer. Also discussed getting electric recliner to assist with sit to stand    -patient educated on diagnosis, prognosis and expectations for rehab  -all patient questions were answered    Home Exercise Program:  Pt has handout including ankle pumps, seated Hip flexion, SLR, mini squats he is currently doing given by home health PT  Discussed and demonstrated this date      Therapeutic Exercise and NMR EXR  [x] (67247) Provided verbal/tactile cueing for activities related to strengthening, flexibility, endurance, ROM for improvements in  [x] LE / Lumbar: LE, proximal hip, and core control with self care, mobility, lifting, ambulation. [] UE / Cervical: cervical, postural, scapular, scapulothoracic and UE control with self care, reaching, carrying, lifting, house/yardwork, driving, computer work.  [] (08069) Provided verbal/tactile cueing for activities related to improving balance, coordination, kinesthetic sense, posture, motor skill, proprioception to assist with   [] LE / lumbar: LE, proximal hip, and core control in self care, mobility, lifting, ambulation and eccentric single leg control. [] UE / cervical: cervical, scapular, scapulothoracic and UE control with self care, reaching, carrying, lifting, house/yardwork, driving, computer work.   [] (73153) Therapist is in constant attendance of 2 or more patients providing skilled therapy interventions, but not providing any significant amount of measurable one-on-one time to either patient, for improvements in  [] LE / lumbar: LE, proximal hip, and core control in self care, mobility, lifting, ambulation and eccentric single leg control.    [] UE / cervical: cervical, scapular, scapulothoracic and UE control scapulothoracic and UE control with self care, reaching, carrying, lifting, house/yardwork, driving, computer work    Manual Treatments:  PROM / STM / Oscillations-Mobs:  G-I, II, III, IV (PA's, Inf., Post.)  [] (04064) Provided manual therapy to mobilize LE, proximal hip and/or LS spine soft tissue/joints for the purpose of modulating pain, promoting relaxation,  increasing ROM, reducing/eliminating soft tissue swelling/inflammation/restriction, improving soft tissue extensibility and allowing for proper ROM for normal function with   [] LE / lumbar: self care, mobility, lifting and ambulation. [] UE / Cervical: self care, reaching, carrying, lifting, house/yardwork, driving, computer work. Modalities:  [] (41803) Vasopneumatic compression: Utilized vasopneumatic compression to decrease edema / swelling for the purpose of improving mobility and quad tone / recruitment which will allow for increased overall function including but not limited to self-care, transfers, ambulation, and ascending / descending stairs. Modalities:  11/20 IFC to low back with HP and grey bolster x 15 mins     Charges:  Timed Code Treatment Minutes: 50   Total Treatment Minutes: 50     [] EVAL - LOW (31201)   [] EVAL - MOD (90486)  [] EVAL - HIGH (44004)  [] RE-EVAL (56588)  [x] AV(18531) x 2      [] Ionto  [] NMR (19459) x 1       [] Vaso  [] Manual (21488) x       [] Ultrasound  [x] TA x 1       [] Mech Traction (60173)  [] Aquatic Therapy x      [] ES (un) (92157):   [] Home Management Training x 1 [] ES(attended) (54895)   [] Dry Needling 1-2 muscles (17860):  [] Dry Needling 3+ muscles (534350)  [] Group:      [] Other:     GOALS: Patient stated goal: strengthen legs to do stuff, such as golfing if able   [x]? Progressing: []? Met: []? Not Met: []? Adjusted     Therapist goals for Patient:   Short Term Goals: To be achieved in: 2 weeks  1.  Independent in HEP and progression per patient tolerance, in order to prevent re-injury. [x]? Progressing: []? Met: []? Not Met: []? Adjusted  2. Patient will have a decrease in pain to facilitate improvement in movement, function, and ADLs as indicated by Functional Deficits. [x]? Progressing: []? Met: []? Not Met: []? Adjusted     Long Term Goals: To be achieved in: 6 weeks  1. Disability index score of 30% or less for the LEFS to assist with reaching prior level of function. [x]? Progressing: []? Met: []? Not Met: []? Adjusted  3. Patient will demonstrate an increase in Strength to at least 4/5 in RLE as well as good proximal hip strength and control to allow for proper functional mobility as indicated by patients Functional Deficits. [x]? Progressing: []? Met: []? Not Met: []? Adjusted  4. Patient will return to functional activities including ambulating 3-4 blocks or around [facility] by patient's home without increased symptoms or restriction to resume PLOF--Said he hasn't tried to walk around the mall yet    [x]? Progressing: []? Met: []? Not Met: []? Adjusted  5. Pt will reduce risk for falls by improving semi-tandem balance to 30 secs/ NEW GOAL:   Pt will improve tandem balance to 30 secs and/or perform 30 sec sit to stand test with good mechanics to help reduce risk for falls   []? Progressing: [x]? Met: []? Not Met: [x]? Adjusted         Overall Progression Towards Functional goals/ Treatment Progress Update:  [x] Patient is progressing as expected towards functional goals listed. [] Progression is slowed due to complexities/Impairments listed. [] Progression has been slowed due to co-morbidities.   [] Plan just implemented, too soon to assess goals progression <30days   [] Goals require adjustment due to lack of progress  [] Patient is not progressing as expected and requires additional follow up with physician  [] Other    Persisting Functional Limitations/Impairments:  []Sleeping []Sitting               [x]Standing [x]Transfers        [x]Walking []Kneeling               [x]Stairs [x]Squatting / bending   []ADLs [x]Reaching  [x]Lifting  [x]Housework  []Driving []Job related tasks  []Sports/Recreation []Other:        ASSESSMENT:    Pt with significant difficulty with sit to stand,  L shoulder pain limiting his ability to push w L arm. Had to switch to more gravity assisted positions with exercises. Required mod - Max A x 1 for sit to stand from chair . He has Rx for walker,  Pt has new order for eval/treat for left shoulder and I will see patient for that next week as well as continuing to work on original order for LE. He has had additional falls, but is aware of safety issues. Continue to focus on LE strengthening in gravity minimized position until can achieve 3/5 for Quad strength B. Will look to have longer sessions so that we can work on LE and new script for shoulder once evaluation is completed. Has had a set back with falls and with continuing declining bilateral quad strength. Cont 2x/week x 5-6 weeks. Treatment/Activity Tolerance:  [x] Patient able to complete tx  [] Patient limited by fatigue  [] Patient limited by pain  [] Patient limited by other medical complications  [] Other:     Prognosis: [x] Good [] Fair  [] Poor    Patient Requires Follow-up: [x] Yes  [] No    Plan for next treatment session:  Balance, LE strengthening. Will progress/update with shoulder exercises once eval is completed     PLAN: See eval. PT 2x / week for 6 weeks. Cont 2x/week x 4-6 weeks    [x] Continue per plan of care [] Alter current plan (see comments)  [] Plan of care initiated [] Hold pending MD visit [] Discharge    Electronically signed by: Ethan Sibley PT       Note: If patient does not return for scheduled/ recommended follow up visits, this note will serve as a discharge from care along with most recent update on progress.

## 2020-12-06 PROBLEM — M12.812 ROTATOR CUFF ARTHROPATHY OF LEFT SHOULDER: Status: ACTIVE | Noted: 2020-12-06

## 2020-12-08 ENCOUNTER — TELEPHONE (OUTPATIENT)
Dept: ORTHOPEDIC SURGERY | Age: 80
End: 2020-12-08

## 2020-12-08 ENCOUNTER — HOSPITAL ENCOUNTER (OUTPATIENT)
Dept: PHYSICAL THERAPY | Age: 80
Setting detail: THERAPIES SERIES
Discharge: HOME | End: 2020-12-08
Payer: MEDICARE

## 2020-12-08 PROCEDURE — 97110 THERAPEUTIC EXERCISES: CPT

## 2020-12-08 PROCEDURE — G0283 ELEC STIM OTHER THAN WOUND: HCPCS

## 2020-12-08 PROCEDURE — 97530 THERAPEUTIC ACTIVITIES: CPT

## 2020-12-08 NOTE — FLOWSHEET NOTE
[address] [facility] Physical Therapy  Phone: [phone]   Fax: [phone]    Physical Therapy Daily Treatment Note/Progress Report   Date:  2020    Patient Name:  Joi Gonzalez    :  1940  MRN: [id number]  Medical/Treatment Diagnosis Information:  · Diagnosis: Neuropathy G62.9  · Treatment Diagnosis: Impaired balance, decreased ankle/hip/knee strength and stability, increased fall risk  Insurance/Certification information:  PT Insurance Information: Medicare  Physician Information:  Referring Practitioner: Iraj Coronado  Plan of care signed (Y/N): []  Yes [x]  No     Date of Patient follow up with Physician:      Progress Report: []  Yes  [x]  No     Date Range for reporting period:  Beginnin20  Endin20     Progress report due (10 Rx/or 30 days whichever is less): visit #39 or     Recertification due (POC duration/ or 90 days whichever is less):  POC duration       Visit # Insurance Allowable Auth required? Date Range    +   10/8-12   +   [place] []  Yes  [x]  No       Latex Allergy:  [x]NO      []YES  Preferred Language for Healthcare:   [x]English       []other:    Functional Scale:        Date assessed:  LEFS: raw score = 28; dysfunction 60-79%   10/23     Pain level:  5/10   L shoulder pain,  R quad;   0/10 in low back  Today. SUBJECTIVE:   Corrina Sibley everything is about the same, been working on the shoulder          OBJECTIVE: ,   Pt required physical assist to lift RLE onto bike/nustep today. Multiple attempts  And significant compensatory patterns for sit to/from stand. :   Pt required mod - max  A to perform sit to stand today mostly due to L shoulder and R quad pain, and overall LE weakness. No orders in system yet for therapy. Advised pt. Dr. Danelle Manuel wanted RTC in 6 weeks.    Gait:  Ambulates with a RW for first time since fall  Transfers:   Mod A, max difficulty with max forward trunk lean to perform sit Sit to stands from edge of mat    10/5 Attempted, unable, try at future visits   10/16 with PT assist          Partial Mini Squats  10/9 Added, mod verbal/tactile cues for form           Gait training on grey mats in // bars    10/23 Added, unsupported    Ramps up/down with SPC by [employer] court     10/26 Added  11/3  Descended forward, ascended backward  With [facility]              10/30 tried, pt apprehensive       10  10  10   Gravity Minimized due to wekaness in anti- gravity position          Neuromuscular Re-ed (57836)       Airex Tandem Balance   9/22 Added   SLS Airex   9/25 Added, occ UE support    Wobbleboard carpet       BOSU Lunges Fwd  With 5\" hold  X 15 B 9/22 Added   10/5  Mild collapse RLE today   Hip Hikes, lateral (foot flat) 10/5 Added    Alt Toe taps standing on Airex  9/25 Added, unsupported    Shuttle Step-Up to [employer]  Shuttle Hip Abduction  Shuttle Marching  Added 10/1          [employer]               1/2 wedge DF, PF static standing   10/19 Added    Hand to opp knee grey mat with ambulation      10/23 Added                  Manual Intervention (60163)       STM lumbar paraspinals, hamstring/pifiromis stretch                                              Pt. Education: 12/1  Discussed with pt Importance of safety,  Pt does have raised toilet seat,  suggested he use to minimize stress on L shoulder as well as make sit to stand easier/safer.   Also discussed getting electric recliner to assist with sit to stand    -patient educated on diagnosis, prognosis and expectations for rehab  -all patient questions were answered    Home Exercise Program:  Pt has handout including ankle pumps, seated Hip flexion, SLR, mini squats he is currently doing given by home health PT  Discussed and demonstrated this date      Therapeutic Exercise and NMR EXR  [x] (17872) Provided verbal/tactile cueing for activities related to strengthening, flexibility, endurance, ROM for improvements in  [x] LE / Lumbar: LE, proximal hip, and core control with self care, mobility, lifting, ambulation. [] UE / Cervical: cervical, postural, scapular, scapulothoracic and UE control with self care, reaching, carrying, lifting, house/yardwork, driving, computer work.  [] (31296) Provided verbal/tactile cueing for activities related to improving balance, coordination, kinesthetic sense, posture, motor skill, proprioception to assist with   [] LE / lumbar: LE, proximal hip, and core control in self care, mobility, lifting, ambulation and eccentric single leg control. [] UE / cervical: cervical, scapular, scapulothoracic and UE control with self care, reaching, carrying, lifting, house/yardwork, driving, computer work.   [] (90799) Therapist is in constant attendance of 2 or more patients providing skilled therapy interventions, but not providing any significant amount of measurable one-on-one time to either patient, for improvements in  [] LE / lumbar: LE, proximal hip, and core control in self care, mobility, lifting, ambulation and eccentric single leg control. [] UE / cervical: cervical, scapular, scapulothoracic and UE control with self care, reaching, carrying, lifting, house/yardwork, driving, computer work.      NMR and Therapeutic Activities:    [] (07771 or 66634) Provided verbal/tactile cueing for activities related to improving balance, coordination, kinesthetic sense, posture, motor skill, proprioception and motor activation to allow for proper function of   [] LE: / Lumbar core, proximal hip and LE with self care and ADLs  [] UE / Cervical: cervical, postural, scapular, scapulothoracic and UE control with self care, carrying, lifting, driving, computer work.   [] (86036) Gait Re-education- Provided training and instruction to the patient for proper LE, core and proximal hip recruitment and positioning and eccentric body weight control with ambulation re-education including up and down stairs     Home Management Training / Self Care:  [x] (11245) Provided self-care/home management training related to activities of daily living and compensatory training, and/or use of adaptive equipment for improvement with: ADLs and compensatory training, meal preparation, safety procedures and instruction in use of adaptive equipment, including bathing, grooming, dressing, personal hygiene, basic household cleaning and chores. Home Exercise Program:    [] (49879) Reviewed/Progressed HEP activities related to strengthening, flexibility, endurance, ROM of   [] LE / Lumbar: core, proximal hip and LE for functional self-care, mobility, lifting and ambulation/stair navigation   [] UE / Cervical: cervical, postural, scapular, scapulothoracic and UE control with self care, reaching, carrying, lifting, house/yardwork, driving, computer work  [] (15704)Reviewed/Progressed HEP activities related to improving balance, coordination, kinesthetic sense, posture, motor skill, proprioception of   [] LE: core, proximal hip and LE for self care, mobility, lifting, and ambulation/stair navigation    [] UE / Cervical: cervical, postural,  scapular, scapulothoracic and UE control with self care, reaching, carrying, lifting, house/yardwork, driving, computer work    Manual Treatments:  PROM / STM / Oscillations-Mobs:  G-I, II, III, IV (PA's, Inf., Post.)  [] (02483) Provided manual therapy to mobilize LE, proximal hip and/or LS spine soft tissue/joints for the purpose of modulating pain, promoting relaxation,  increasing ROM, reducing/eliminating soft tissue swelling/inflammation/restriction, improving soft tissue extensibility and allowing for proper ROM for normal function with   [] LE / lumbar: self care, mobility, lifting and ambulation. [] UE / Cervical: self care, reaching, carrying, lifting, house/yardwork, driving, computer work.      Modalities:  [] (75186) Vasopneumatic compression: Utilized vasopneumatic compression to decrease edema / swelling for the purpose of improving mobility and quad tone / recruitment which will allow for increased overall function including but not limited to self-care, transfers, ambulation, and ascending / descending stairs. Modalities:  12/8  E-stim IFC  to left shoulder with CP in semi-reclined position in supine with roll under knees x 15'    11/20 IFC to low back with HP and grey bolster x 15 mins     Charges:  Timed Code Treatment Minutes: 40   Total Treatment Minutes: 55     [] EVAL - LOW (93201)   [] EVAL - MOD (11689)  [] EVAL - HIGH (12003)  [] RE-EVAL (85868)  [x] RE(66690) x 2      [] Ionto  [] NMR (36199) x 1       [] Vaso  [] Manual (42211) x       [] Ultrasound  [x] TA x 1       [] Mech Traction (52224)  [] Aquatic Therapy x      [x] ES (un) (10265):   [] Home Management Training x 1 [] ES(attended) (87057)   [] Dry Needling 1-2 muscles (24229):  [] Dry Needling 3+ muscles (564196)  [] Group:      [] Other:     GOALS: Patient stated goal: strengthen legs to do stuff, such as golfing if able   [x]? Progressing: []? Met: []? Not Met: []? Adjusted     Therapist goals for Patient:   Short Term Goals: To be achieved in: 2 weeks  1. Independent in HEP and progression per patient tolerance, in order to prevent re-injury. [x]? Progressing: []? Met: []? Not Met: []? Adjusted  2. Patient will have a decrease in pain to facilitate improvement in movement, function, and ADLs as indicated by Functional Deficits. [x]? Progressing: []? Met: []? Not Met: []? Adjusted     Long Term Goals: To be achieved in: 6 weeks  1. Disability index score of 30% or less for the LEFS to assist with reaching prior level of function. [x]? Progressing: []? Met: []? Not Met: []? Adjusted  3. Patient will demonstrate an increase in Strength to at least 4/5 in RLE as well as good proximal hip strength and control to allow for proper functional mobility as indicated by patients Functional Deficits. [x]? Progressing: []? Met: []?  Not Met: []? discomfort. Pt is already set to be seen for left shoulder evaluation tomorrow after a recent fall at home where he landed on shoulder and received PT referral.  Pt was issued e-stim and CP and had improved pain after session. Will eval for shoulder tomorrow and add shoulder into POC along with continuing with LE weakness. May need to do 90 min sessions in order to accommodate all the patient's deficiencies and needs for rehab. Treatment/Activity Tolerance:  [x] Patient able to complete tx  [] Patient limited by fatigue  [] Patient limited by pain  [] Patient limited by other medical complications  [] Other:     Prognosis: [x] Good [] Fair  [] Poor    Patient Requires Follow-up: [x] Yes  [] No    Plan for next treatment session:  Balance, LE strengthening. Will progress/update with shoulder exercises once eval is completed     PLAN: See eval. PT 2x / week for 6 weeks. Cont 2x/week x 4-6 weeks    [x] Continue per plan of care [] Alter current plan (see comments)  [] Plan of care initiated [] Hold pending MD visit [] Discharge    Electronically signed by: Filomena Arango PT       Note: If patient does not return for scheduled/ recommended follow up visits, this note will serve as a discharge from care along with most recent update on progress.

## 2020-12-08 NOTE — TELEPHONE ENCOUNTER
General Question     Subject: WALKER   Patient CHILD CALLED: BIJU  Contact Number: [phone]  FOLLOWING UP ON ORDER SENT TO [facility]'S FOR PATIENT WALKER. PLEASE CALL BACK TO DISCUSS.

## 2020-12-09 ENCOUNTER — HOSPITAL ENCOUNTER (OUTPATIENT)
Dept: PHYSICAL THERAPY | Age: 80
Setting detail: THERAPIES SERIES
Discharge: HOME | End: 2020-12-09
Payer: MEDICARE

## 2020-12-09 PROCEDURE — 97162 PT EVAL MOD COMPLEX 30 MIN: CPT

## 2020-12-09 PROCEDURE — 97530 THERAPEUTIC ACTIVITIES: CPT

## 2020-12-09 PROCEDURE — 97110 THERAPEUTIC EXERCISES: CPT

## 2020-12-09 NOTE — FLOWSHEET NOTE
(19949)                                                     Pt. Education:  -patient educated on diagnosis, prognosis and expectations for rehab  -all patient questions were answered    Home Exercise Program:    Access Code: [email or web address]    URL: [email or web address]   Date: 12/09/2020   Prepared by: Maximilian Wasserman      Exercises  Seated Scapular Retraction - 10 reps - 2-3 sets - 2x daily - 7x weekly  Seated Shoulder Flexion Towel Slide at Table Top - 10 reps - 2-3 sets - 5 secs hold - 2x daily - 7x weekly  Isometric Shoulder External Rotation - 10 reps - 2-3 sets - 5 secs hold - 2x daily - 7x weekly  Isometric Shoulder Internal Rotation - 10 reps - 2-3 sets - 5 secs hold - 2x daily - 7x weekly  Isometric Shoulder Flexion at Wall - 10 reps - 2-3 sets - 5 secs hold - 2x daily - 7x weekly      Therapeutic Exercise and NMR EXR  [] (15814) Provided verbal/tactile cueing for activities related to strengthening, flexibility, endurance, ROM for improvements in  [] LE / Lumbar: LE, proximal hip, and core control with self care, mobility, lifting, ambulation. [] UE / Cervical: cervical, postural, scapular, scapulothoracic and UE control with self care, reaching, carrying, lifting, house/yardwork, driving, computer work.  [] (21626) Provided verbal/tactile cueing for activities related to improving balance, coordination, kinesthetic sense, posture, motor skill, proprioception to assist with   [] LE / lumbar: LE, proximal hip, and core control in self care, mobility, lifting, ambulation and eccentric single leg control.    [] UE / cervical: cervical, scapular, scapulothoracic and UE control with self care, reaching, carrying, lifting, house/yardwork, driving, computer work.   [] (76801) Therapist is in constant attendance of 2 or more patients providing skilled therapy interventions, but not providing any significant amount of measurable one-on-one time to either patient, for improvements in  [] LE / lumbar: LE, proximal hip, and core control in self care, mobility, lifting, ambulation and eccentric single leg control. [] UE / cervical: cervical, scapular, scapulothoracic and UE control with self care, reaching, carrying, lifting, house/yardwork, driving, computer work. NMR and Therapeutic Activities:    [] (38484 or 04395) Provided verbal/tactile cueing for activities related to improving balance, coordination, kinesthetic sense, posture, motor skill, proprioception and motor activation to allow for proper function of   [] LE: / Lumbar core, proximal hip and LE with self care and ADLs  [] UE / Cervical: cervical, postural, scapular, scapulothoracic and UE control with self care, carrying, lifting, driving, computer work.   [] (36078) Gait Re-education- Provided training and instruction to the patient for proper LE, core and proximal hip recruitment and positioning and eccentric body weight control with ambulation re-education including up and down stairs     Home Management Training / Self Care:  [] (86225) Provided self-care/home management training related to activities of daily living and compensatory training, and/or use of adaptive equipment for improvement with: ADLs and compensatory training, meal preparation, safety procedures and instruction in use of adaptive equipment, including bathing, grooming, dressing, personal hygiene, basic household cleaning and chores.      Home Exercise Program:    [x] (20227) Reviewed/Progressed HEP activities related to strengthening, flexibility, endurance, ROM of   [] LE / Lumbar: core, proximal hip and LE for functional self-care, mobility, lifting and ambulation/stair navigation   [] UE / Cervical: cervical, postural, scapular, scapulothoracic and UE control with self care, reaching, carrying, lifting, house/yardwork, driving, computer work  [] (57586)Reviewed/Progressed HEP activities related to improving balance, coordination, kinesthetic sense, posture, motor skill, proprioception progression per patient tolerance, in order to prevent re-injury. []? Progressing: []? Met: []? Not Met: []? Adjusted  2. Patient will have a decrease in pain to facilitate improvement in movement, function, and ADLs as indicated by Functional Deficits. []? Progressing: []? Met: []? Not Met: []? Adjusted     Long Term Goals: To be achieved in: 5 weeks  1. Disability index score of 20% or less for the UEFI or Quick DASH to assist with reaching prior level of function. []? Progressing: []? Met: []? Not Met: []? Adjusted  2. Patient will demonstrate increased AROM to 120* left shoulder flexion to allow for proper joint functioning as indicated by patients Functional Deficits. []? Progressing: []? Met: []? Not Met: []? Adjusted  3. Patient will demonstrate an increase in Strength to 3+/5 left shoulder to allow for proper functional mobility as indicated by patients Functional Deficits. []? Progressing: []? Met: []? Not Met: []? Adjusted  4. Patient will return to functional activities including lifting arm in functional ranges without increased symptoms or restriction. []? Progressing: []? Met: []? Not Met: []? Adjusted    Overall Progression Towards Functional goals/ Treatment Progress Update:  [] Patient is progressing as expected towards functional goals listed. [] Progression is slowed due to complexities/Impairments listed. [] Progression has been slowed due to co-morbidities.   [x] Plan just implemented, too soon to assess goals progression <30days   [] Goals require adjustment due to lack of progress  [] Patient is not progressing as expected and requires additional follow up with physician  [] Other    Persisting Functional Limitations/Impairments:  []Sleeping []Sitting               []Standing [x]Transfers        []Walking []Kneeling               []Stairs []Squatting / bending   [x]ADLs [x]Reaching  [x]Lifting  [x]Housework  [x]Driving []Job related tasks  []Sports/Recreation []Other:        ASSESSMENT:  See eval  Treatment/Activity Tolerance:  [] Patient able to complete tx [] Patient limited by fatigue  [] Patient limited by pain  [] Patient limited by other medical complications  [] Other:     Prognosis: [] Good [] Fair  [] Poor    Patient Requires Follow-up: [x] Yes  [] No    Plan for next treatment session:    PLAN: See eval. PT 2x / week for 5 weeks. [] Continue per plan of care [] Alter current plan (see comments)  [x] Plan of care initiated [] Hold pending MD visit [] Discharge    Electronically signed by: Brandyn Richards PT    Note: If patient does not return for scheduled/ recommended follow up visits, this note will serve as a discharge from care along with most recent update on progress.

## 2020-12-09 NOTE — PLAN OF CARE
[address] [address]  Phone: [phone]   Fax: [phone]                                                     Physical Therapy Certification    Dear Referring Practitioner: Sherman Herr,    We had the pleasure of evaluating the following patient for physical therapy services at [address] [place] [address]. A summary of our findings can be found in the initial assessment below. This includes our plan of care. If you have any questions or concerns regarding these findings, please do not hesitate to contact me at the office phone number checked above. Thank you for the referral.       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 Is currently being seeing for balance deficits and LE neuropathy, weakness. Pt suffered a fall on 20 while attempting to get himself out of bed. Pt then had a new fall on  where he landed on his knees and caught himself with his left arm. Has had some imbalance episodes during PT visits in our clinic as well.   Pain in shoulder is worse when lifting overhead, and has a lot of difficulty with pushing himself up from a chair . Had a cortisone injection in shoulder on 11/24/20, but now pain is worse since his fall     Relevant Medical History: right lateral ankle sprain, possible carpal tunnel syndrome, bilateral hand numbness/neuropathy, anxiety/arthritis, DM, HTN, Hyperlipidemia, hearing issues, history of 2 fractured ankles with PT, Right shoulder replacement     Functional Outcome:  Quick Dash 34, 40-59%         Pain Scale: 5-6/10 right shoulder   Easing factors:  rest  Provocative factors: lifting overhead, AROM      Type: []? Constant       [x]? Intermittent  []? Radiating     []? Localized     []? Other:                Numbness/Tingling: Neuropathy BLE, also in hands (potential carpal tunnel)     Occupation/School: retired      Living Status/Prior Level of Function:Prior to this injury / incident, pt was independent with ADLs and IADLs. 1 story home. 1 step into home. With wife, does cooking, and helps cleaning. Riding lawnmower , difficulty with trimming. Enjoyed golfing a lot but hasn't golfed in 3 years. Ordered a lift chair that will arrive on Friday         OBJECTIVE:   Hand dominance:  Right     Palpation:      Functional Mobility/Transfers: Slow, steady, SBA with use of walker.   Requires mod A and > 1 attempt to perform sit to/from stand transfer      Posture:      Bandages/Dressings/Incisions:      Gait: (include devices/WB status): Ambulates majority of time with RW, wooden cane in home, RW in community      Objective:    Dermatomes Normal Abnormal Comments   Top of head (C1) x     Posterior occipital region (C2) x     Side of neck (C3) x     Top of shoulder (C4) x     Lateral deltoid (C5) x     Tip of thumb (C6) x     Distal middle finger (C7) x     Distal fifth finger (C8) x     Medial forearm (T1) x             Reflexes Normal Abnormal Comments   C5-6 Biceps x     C5-6 Brachioradialis x     C7-8 Triceps x     Barreras x       :  Pt with significant pain with LUE, unable to elevate in antigravity positions     PROM AROM    L R L R   Cervical Flexion        Cervical Extension        Cervical Rotation       Cervical Side-bend       Shoulder Flexion  170- pain at end range with all motions   10 170   Shoulder Abduction  160  10 160   Shoulder External Rotation  WNL  N/T T1   Shoulder Internal Rotation  WNL  N/T WFL   Elbow Flexion        Elbow Extension        Pronation        Supination        Wrist Flexion        Wrist Extension        Radial Deviation        Ulnar Deviation            Strength (0-5) Left Right    Shoulder Shrug (C4)  5   Shoulder Flex n/t 4+   Shoulder Abd (C5) N/t  4   Shoulder ER 1+ 3   Shoulder IR 4 3+   Biceps (C6) 3+ 4-   Triceps (C7)     Lats     Middle Trap     Rhomboids     Lower Trap      Pronation      Supination      Wrist Flex (C7)     Wrist Ext (C6)     Wrist Radial Deviation     Wrist Ulnar Deviation                    Joint mobility:    []Normal    []Hypo   []Hyper      Orthopaedic Special Tests Positive  Negative  NT Comments    Shoulder        Neer  x      Edson-Brennan x      Empty Can x      Drop Arm  x     Imperial's  x     Speeds        Sulcus        Apprehension (Anterior / Posterior)       Load and Shift              Elbow        Cozen's       Varus Stress       Valgus Stress        Tinel's                                                   [x] Patient history, allergies, meds reviewed. Medical chart reviewed. See intake form. Review Of Systems (ROS):  [x]Performed Review of systems (Integumentary, CardioPulmonary, Neurological) by intake and observation. Intake form has been scanned into medical record. Patient has been instructed to contact their primary care physician regarding ROS issues if not already being addressed at this time.       Co-morbidities/Complexities (which will affect course of rehabilitation):   []None           Arthritic conditions   []Rheumatoid arthritis (M05.9)  []Osteoarthritis (M19.91)   Cardiovascular conditions   []Hypertension (I10)  []Hyperlipidemia (E78.5)  []Angina pectoris (I20)  []Atherosclerosis (I70)   Musculoskeletal conditions   []Disc pathology   []Congenital spine pathologies   []Prior surgical intervention  []Osteoporosis (M81.8)  []Osteopenia (M85.8)   Endocrine conditions   []Hypothyroid (E03.9)  []Hyperthyroid Gastrointestinal conditions   []Constipation (G66.98)   Metabolic conditions   []Morbid obesity (E66.01)  []Diabetes type 1(E10.65) or 2 (E11.65)   []Neuropathy (G60.9)     Pulmonary conditions   []Asthma (J45)  []Coughing   []COPD (J44.9)   Psychological Disorders  []Anxiety (F41.9)  []Depression (F32.9)   []Other:   []Other:          Barriers to/and or personal factors that will affect rehab potential:              [x]Age  []Sex    []Smoker              []Motivation/Lack of Motivation                        [x]Co-Morbidities              []Cognitive Function, education/learning barriers              []Environmental, home barriers              []profession/work barriers  [x]past PT/medical experience  []other:  Justification:      Falls Risk Assessment (30 days):   [x] Falls Risk assessed and no intervention required.   [] Falls Risk assessed and Patient requires intervention due to being higher risk   TUG score (>12s at risk):     [] Falls education provided, including       ASSESSMENT:   Functional Impairments   [x]Noted spinal or UE joint hypomobility   []Noted spinal or UE joint hypermobility   [x]Decreased UE functional ROM   [x]Decreased UE functional strength   []Abnormal reflexes/sensation/myotomal/dermatomal deficits   [x]Decreased RC/scapular/core strength and neuromuscular control   []other:      Functional Activity Limitations (from functional questionnaire and intake)   [x]Reduced ability to tolerate prolonged functional positions   [x]Reduced ability or difficulty with changes of positions or transfers between positions   [x]Reduced ability to maintain good posture and demonstrate good body mechanics with sitting, bending, and lifting   [x] Reduced ability or tolerance with driving and/or computer work   [x]Reduced ability to sleep   [x]Reduced ability to perform lifting, reaching, carrying tasks   [x]Reduced ability to tolerate impact through UE   [x]Reduced ability to reach behind back   [x]Reduced ability to  or hold objects   [x]Reduced ability to throw or toss an object   []other:    Participation Restrictions   [x]Reduced participation in self care activities   [x]Reduced participation in home management activities   []Reduced participation in work activities   [x]Reduced participation in social activities. [x]Reduced participation in sport/recreation activities. Classification:   [x]Signs/symptoms consistent with post-surgical status including decreased ROM, strength and function.   [x]Signs/symptoms consistent with joint sprain/strain   [x]Signs/symptoms consistent with shoulder impingement   []Signs/symptoms consistent with shoulder/elbow/wrist tendinopathy   [x]Signs/symptoms consistent with Rotator cuff tear   []Signs/symptoms consistent with labral tear   []Signs/symptoms consistent with postural dysfunction    []Signs/symptoms consistent with Glenohumeral IR Deficit - <45 degrees   []Signs/symptoms consistent with facet dysfunction of cervical/thoracic spine    []Signs/symptoms consistent with pathology which may benefit from Dry needling     []other:     Prognosis/Rehab Potential:      []Excellent   []Good    [x]Fair   []Poor    Tolerance of evaluation/treatment:    []Excellent   []Good    [x]Fair   []Poor    Physical Therapy Evaluation Complexity Justification  [x] A history of present problem with:  [] no personal factors and/or comorbidities that impact the plan of care;  [x]1-2 personal factors and/or comorbidities that impact the plan of care  []3 personal factors and/or comorbidities that impact the plan of care  [x] An examination of body systems using reps - 2-3 sets - 5 secs hold - 2x daily - 7x weekly  Isometric Shoulder Flexion at Wall - 10 reps - 2-3 sets - 5 secs hold - 2x daily - 7x weekly    GOALS:  Patient stated goal: better motion  [] Progressing: [] Met: [] Not Met: [] Adjusted    Therapist goals for Patient:   Short Term Goals: To be achieved in: 2 weeks  1. Independent in HEP and progression per patient tolerance, in order to prevent re-injury. [] Progressing: [] Met: [] Not Met: [] Adjusted  2. Patient will have a decrease in pain to facilitate improvement in movement, function, and ADLs as indicated by Functional Deficits. [] Progressing: [] Met: [] Not Met: [] Adjusted    Long Term Goals: To be achieved in: 5 weeks  1. Disability index score of 20% or less for the UEFI or Quick DASH to assist with reaching prior level of function. [] Progressing: [] Met: [] Not Met: [] Adjusted  2. Patient will demonstrate increased AROM to 120* left shoulder flexion to allow for proper joint functioning as indicated by patients Functional Deficits. [] Progressing: [] Met: [] Not Met: [] Adjusted  3. Patient will demonstrate an increase in Strength to 3+/5 left shoulder to allow for proper functional mobility as indicated by patients Functional Deficits. [] Progressing: [] Met: [] Not Met: [] Adjusted  4. Patient will return to functional activities including lifting arm in functional ranges without increased symptoms or restriction.    [] Progressing: [] Met: [] Not Met: [] Adjusted    Electronically signed by:  Katherine Naqvi, PT

## 2020-12-14 ENCOUNTER — OFFICE VISIT (OUTPATIENT)
Dept: ORTHOPEDIC SURGERY | Age: 80
End: 2020-12-14
Payer: MEDICARE

## 2020-12-14 ENCOUNTER — HOSPITAL ENCOUNTER (OUTPATIENT)
Dept: PHYSICAL THERAPY | Age: 80
Setting detail: THERAPIES SERIES
Discharge: HOME | End: 2020-12-14
Payer: MEDICARE

## 2020-12-14 VITALS — TEMPERATURE: 97.1 F

## 2020-12-14 PROCEDURE — G8428 CUR MEDS NOT DOCUMENT: HCPCS | Performed by: PHYSICIAN ASSISTANT

## 2020-12-14 PROCEDURE — 4040F PNEUMOC VAC/ADMIN/RCVD: CPT | Performed by: PHYSICIAN ASSISTANT

## 2020-12-14 PROCEDURE — 1123F ACP DISCUSS/DSCN MKR DOCD: CPT | Performed by: PHYSICIAN ASSISTANT

## 2020-12-14 PROCEDURE — 1036F TOBACCO NON-USER: CPT | Performed by: PHYSICIAN ASSISTANT

## 2020-12-14 PROCEDURE — G8417 CALC BMI ABV UP PARAM F/U: HCPCS | Performed by: PHYSICIAN ASSISTANT

## 2020-12-14 PROCEDURE — 97110 THERAPEUTIC EXERCISES: CPT

## 2020-12-14 PROCEDURE — 99214 OFFICE O/P EST MOD 30 MIN: CPT | Performed by: PHYSICIAN ASSISTANT

## 2020-12-14 PROCEDURE — G8484 FLU IMMUNIZE NO ADMIN: HCPCS | Performed by: PHYSICIAN ASSISTANT

## 2020-12-14 NOTE — FLOWSHEET NOTE
[facility]  Outpatient Physical Therapy  Phone: [phone]   Fax: [phone]    Physical Therapy Daily Treatment Note/Progress Report   Date:  2020    Patient Name:  Yair Juares    :  1940  MRN: [id number]  Medical/Treatment Diagnosis Information:  · Diagnosis: Neuropathy G62.9  · Treatment Diagnosis: Impaired balance, decreased ankle/hip/knee strength and stability, increased fall risk  Insurance/Certification information:  PT Insurance Information: Medicare  Physician Information:  Referring Practitioner: Shahida Desir  Plan of care signed (Y/N): []  Yes [x]  No     Date of Patient follow up with Physician:      Progress Report: []  Yes  [x]  No     Date Range for reporting period:  Beginnin20  Endin20     Progress report due (10 Rx/or 30 days whichever is less): visit #54 or     Recertification due (POC duration/ or 90 days whichever is less):  POC duration       Visit # Insurance Allowable Auth required? Date Range    +   10/8-   +   [place] []  Yes  [x]  No       Latex Allergy:  [x]NO      []YES  Preferred Language for Healthcare:   [x]English       []other:    Functional Scale:        Date assessed:  LEFS: raw score = 28; dysfunction 60-79%   10/23     Pain level:  5/10   L shoulder pain,  R quad;   0/10 in low back  Today. SUBJECTIVE:   Thais Hoang he saw the CNP today in Dr Patrick Lino office who scheduled an MRI for the shoulder. Told him he would either have to have a shoulder replacement or a repair. Said he thought they told him to hold shoulder PT until the MRI results, so only wants to continue working on his leg strengthening. Said he got a lift chair and it has been a life saver for him, made it a lot easier to get up. OBJECTIVE: ,   Pt required physical assist to lift RLE onto bike/nustep today. Multiple attempts  And significant compensatory patterns for sit to/from stand. 12/1:   Pt required mod - max  A to perform sit to stand today mostly due to L shoulder and R quad pain, and overall LE weakness. No orders in system yet for therapy. Advised pt. Dr. Grace Sy wanted RTC in 6 weeks. 12/4   Gait:  Ambulates with a RW for first time since fall  Transfers: Mod A, max difficulty with max forward trunk lean to perform sit to/from stand    PROM AROM     L R L R   Hip Flexion           Hip Abduction           Hip ER           Hip IR           Knee Flexion WNL WNL WNL  WNL   Knee Extension WNL WNL 0 0   Shoulder Scaption     100* with pain       Shoulder Abd      n/t     Shoulder IR      WFL with pain       Shoulder ER      n/t            Strength (0-5) / Myotomes Left Right   Hip Flexion - supine       Hip Flexion - seated (L1-2) 4+ 4-   Hip Abduction 4- 3+   Hip Adduction       Hip ER 4+ 4+   Hip IR 4 4   Quads (L2-4) 3+ 3-   Hamstrings 5 5   Ankle Dorsiflexion (L4-5) 4- 4+   Shoulder Scaption 2+      Shoulder Abd       Shoulder ER  2-     Shoulder IR  3+             Flexibility       Hamstrings (90/90) WFL WFL     RESTRICTIONS/PRECAUTIONS:   FALL RISK     Exercises/Interventions:     Therapeutic Exercises (32889) Resistance / level Sets/sec Reps Notes   Scifit/Nustep  X 5 mins , Level 4       HS Stretch     IB, HR/TR    Mini squat at counter            12/1  placed chair behind pt,  suggested he do this when doing squat at home     Mat:  SL B  Hip flexion/Heel slides   Clamshell - hooklying   Clamshell - sidelying   Knee exten - pillow b/t knees  Quad sets  Hip Abduction supine with slide board     Lime '      2    10\"   X 20 with SB  X 15  X 20   X 10   x 15   x 20     T. G. Squats       Step-Ups Fwd 4\"  X 10 B    Step-Downs  4\"  X 10 B 10/19 Added, with difficulty, use of // bars    Supine SLR Flexion 1# R   L 3 X 10  12/14  AAROM for RLE     SAQ 2# L, 0# R 2 X 10 B 10/1 Added  12/14 AAROM needed for RLE    Clamshells hooklying  Lime9/22 Added Bridging  Lime green 3 X 10     LAQ  L   R 3 X 10 B 9/22 Added, partial range for RLE   12/8  AAROM for RLE    CKC TKE  9/25 Added          Cable Column        Cable Walkouts 4-way  9/28 Added                  [facility]:    Leg Press  HS Curls        TRX Squats    Added 11/10       Attempted, could not lift weight, switched back to LAQ with 1/2#                 LTR  SKTC  Seated trunk flexion edge of mat      1                                 Therapeutic Activities (73609)              Lateral Band Walk 10/9 Added           Sit to stands from edge of mat    10/5 Attempted, unable, try at future visits   10/16 with PT assist          Partial Mini Squats  10/9 Added, mod verbal/tactile cues for form           Gait training on grey mats in // bars    10/23 Added, unsupported    Ramps up/down with SPC by [employer]     10/26 Added  11/3  Descended forward, ascended backward  With [facility]              10/30 tried, pt apprehensive       10  10  10   Gravity Minimized due to wekaness in anti- gravity position          Neuromuscular Re-ed (45916)       Airex Tandem Balance   9/22 Added   SLS Airex   9/25 Added, occ UE support    Wobbleboard carpet       BOSU Lunges Fwd  With 5\" hold  X 15 B 9/22 Added   10/5  Mild collapse RLE today   Hip Hikes, lateral (foot flat) 10/5 Added    Alt Toe taps standing on Airex  9/25 Added, unsupported    Shuttle Step-Up to [employer]  Shuttle Hip Abduction  Shuttle Marching  Added 10/1          Bi[employer]x               1/2 wedge DF, PF static standing   10/19 Added    Hand to opp knee grey mat with ambulation      10/23 Added        CONSIDER [place] STIM FOR RIGHT QUAD           Manual Intervention (98217)       STM lumbar paraspinals, hamstring/pifiromis stretch Pt. Education: 12/1  Discussed with pt Importance of safety,  Pt does have raised toilet seat,  suggested he use to minimize stress on L shoulder as well as make sit to stand easier/safer. Also discussed getting electric recliner to assist with sit to stand    -patient educated on diagnosis, prognosis and expectations for rehab  -all patient questions were answered    Home Exercise Program:  Pt has handout including ankle pumps, seated Hip flexion, SLR, mini squats he is currently doing given by home health PT  Discussed and demonstrated this date      Therapeutic Exercise and NMR EXR  [x] (61310) Provided verbal/tactile cueing for activities related to strengthening, flexibility, endurance, ROM for improvements in  [x] LE / Lumbar: LE, proximal hip, and core control with self care, mobility, lifting, ambulation. [] UE / Cervical: cervical, postural, scapular, scapulothoracic and UE control with self care, reaching, carrying, lifting, house/yardwork, driving, computer work.  [] (17875) Provided verbal/tactile cueing for activities related to improving balance, coordination, kinesthetic sense, posture, motor skill, proprioception to assist with   [] LE / lumbar: LE, proximal hip, and core control in self care, mobility, lifting, ambulation and eccentric single leg control. [] UE / cervical: cervical, scapular, scapulothoracic and UE control with self care, reaching, carrying, lifting, house/yardwork, driving, computer work.   [] (30213) Therapist is in constant attendance of 2 or more patients providing skilled therapy interventions, but not providing any significant amount of measurable one-on-one time to either patient, for improvements in  [] LE / lumbar: LE, proximal hip, and core control in self care, mobility, lifting, ambulation and eccentric single leg control. [] UE / cervical: cervical, scapular, scapulothoracic and UE control with self care, reaching, carrying, lifting, house/yardwork, driving, computer work. NMR and Therapeutic Activities:    [] (51318 or 25526) Provided verbal/tactile cueing for activities related to improving balance, coordination, kinesthetic sense, posture, motor skill, proprioception and motor activation to allow for proper function of   [] LE: / Lumbar core, proximal hip and LE with self care and ADLs  [] UE / Cervical: cervical, postural, scapular, scapulothoracic and UE control with self care, carrying, lifting, driving, computer work.   [] (55768) Gait Re-education- Provided training and instruction to the patient for proper LE, core and proximal hip recruitment and positioning and eccentric body weight control with ambulation re-education including up and down stairs     Home Management Training / Self Care:  [x] (56515) Provided self-care/home management training related to activities of daily living and compensatory training, and/or use of adaptive equipment for improvement with: ADLs and compensatory training, meal preparation, safety procedures and instruction in use of adaptive equipment, including bathing, grooming, dressing, personal hygiene, basic household cleaning and chores.      Home Exercise Program:    [] (48530) Reviewed/Progressed HEP activities related to strengthening, flexibility, endurance, ROM of   [] LE / Lumbar: core, proximal hip and LE for functional self-care, mobility, lifting and ambulation/stair navigation   [] UE / Cervical: cervical, postural, scapular, scapulothoracic and UE control with self care, reaching, carrying, lifting, house/yardwork, driving, computer work  [] (44366)Reviewed/Progressed HEP activities related to improving balance, coordination, kinesthetic sense, posture, motor skill, proprioception of [] LE: core, proximal hip and LE for self care, mobility, lifting, and ambulation/stair navigation    [] UE / Cervical: cervical, postural,  scapular, scapulothoracic and UE control with self care, reaching, carrying, lifting, house/yardwork, driving, computer work    Manual Treatments:  PROM / STM / Oscillations-Mobs:  G-I, II, III, IV (PA's, Inf., Post.)  [] (15874) Provided manual therapy to mobilize LE, proximal hip and/or LS spine soft tissue/joints for the purpose of modulating pain, promoting relaxation,  increasing ROM, reducing/eliminating soft tissue swelling/inflammation/restriction, improving soft tissue extensibility and allowing for proper ROM for normal function with   [] LE / lumbar: self care, mobility, lifting and ambulation. [] UE / Cervical: self care, reaching, carrying, lifting, house/yardwork, driving, computer work. Modalities:  [] (01129) Vasopneumatic compression: Utilized vasopneumatic compression to decrease edema / swelling for the purpose of improving mobility and quad tone / recruitment which will allow for increased overall function including but not limited to self-care, transfers, ambulation, and ascending / descending stairs.        Modalities:  12/8  E-stim IFC  to left shoulder with CP in semi-reclined position in supine with roll under knees x 15'    11/20 IFC to low back with HP and grey bolster x 15 mins     Charges:  Timed Code Treatment Minutes: 44   Total Treatment Minutes: 44     [] EVAL - LOW (42179)   [] EVAL - MOD (72239)  [] EVAL - HIGH (13693)  [] RE-EVAL (05357)  [x] SM(31556) x 3              [] Ionto  [] NMR (88644) x 1       [] Vaso  [] Manual (09049) x       [] Ultrasound  [] TA x 1       [] Mech Traction (69063)  [] Aquatic Therapy x      [x] ES (un) (39859):   [] Home Management Training x 1 [] ES(attended) (70926)   [] Dry Needling 1-2 muscles (02520):  [] Dry Needling 3+ muscles (174258)  [] Group:      [] Other: GOALS: Patient stated goal: strengthen legs to do stuff, such as golfing if able   [x]? Progressing: []? Met: []? Not Met: []? Adjusted     Therapist goals for Patient:   Short Term Goals: To be achieved in: 2 weeks  1. Independent in HEP and progression per patient tolerance, in order to prevent re-injury. [x]? Progressing: []? Met: []? Not Met: []? Adjusted  2. Patient will have a decrease in pain to facilitate improvement in movement, function, and ADLs as indicated by Functional Deficits. [x]? Progressing: []? Met: []? Not Met: []? Adjusted     Long Term Goals: To be achieved in: 6 weeks  1. Disability index score of 30% or less for the LEFS to assist with reaching prior level of function. [x]? Progressing: []? Met: []? Not Met: []? Adjusted  3. Patient will demonstrate an increase in Strength to at least 4/5 in RLE as well as good proximal hip strength and control to allow for proper functional mobility as indicated by patients Functional Deficits. [x]? Progressing: []? Met: []? Not Met: []? Adjusted  4. Patient will return to functional activities including ambulating 3-4 blocks or around [facility] by patient's home without increased symptoms or restriction to resume PLOF--Said he hasn't tried to walk around the mall yet    [x]? Progressing: []? Met: []? Not Met: []? Adjusted  5. Pt will reduce risk for falls by improving semi-tandem balance to 30 secs/ NEW GOAL:   Pt will improve tandem balance to 30 secs and/or perform 30 sec sit to stand test with good mechanics to help reduce risk for falls   []? Progressing: [x]? Met: []? Not Met: [x]? Adjusted         Overall Progression Towards Functional goals/ Treatment Progress Update:  [x] Patient is progressing as expected towards functional goals listed. [] Progression is slowed due to complexities/Impairments listed. [] Progression has been slowed due to co-morbidities.   [] Plan just implemented, too soon to assess goals progression <30days [] Goals require adjustment due to lack of progress  [] Patient is not progressing as expected and requires additional follow up with physician  [] Other    Persisting Functional Limitations/Impairments:  []Sleeping []Sitting               [x]Standing [x]Transfers        [x]Walking []Kneeling               [x]Stairs [x]Squatting / bending   []ADLs [x]Reaching  [x]Lifting  [x]Housework  []Driving []Job related tasks  []Sports/Recreation []Other:        ASSESSMENT:    Re-introduced mostly NWB and open chain strengthening d/t multiple episodes of RLE weakness/buckling as of late. Pt has maximal difficulty with performing SLR or demonstrating adequate quad control when attempting to lift leg. Slowly return to WB activities, with emphasis on dynamic quad control. May need to attempt neuro re-ed with e-stim to work on right quads in an attempt to improve neuromuscular connection. Treatment/Activity Tolerance:  [x] Patient able to complete tx  [] Patient limited by fatigue  [] Patient limited by pain  [] Patient limited by other medical complications  [] Other:     Prognosis: [x] Good [] Fair  [] Poor    Patient Requires Follow-up: [x] Yes  [] No    Plan for next treatment session:  Balance, LE strengthening. Will progress/update with shoulder exercises once eval is completed     PLAN: See eval. PT 2x / week for 6 weeks. Cont 2x/week x 4-6 weeks    [x] Continue per plan of care [] Alter current plan (see comments)  [] Plan of care initiated [] Hold pending MD visit [] Discharge    Electronically signed by: Ethan Sibley PT       Note: If patient does not return for scheduled/ recommended follow up visits, this note will serve as a discharge from care along with most recent update on progress.

## 2020-12-15 PROBLEM — M48.062 SPINAL STENOSIS OF LUMBAR REGION WITH NEUROGENIC CLAUDICATION: Status: ACTIVE | Noted: 2019-10-14

## 2020-12-15 NOTE — PROGRESS NOTES
Subjective:      Patient ID: Jarad Juarez is a 78 y.o.  male. Chief Complaint   Patient presents with    Lower Back Pain     fu low back pain    Shoulder Pain     right shoulder pain        HPI: He is here for follow-up on his low back pain, stenosis as well as left shoulder pain. He states his low back is doing much better. He is no longer having significant radicular complaints. Denies any significant perceived weakness in the lower extremities. Left shoulder did not receive much benefit from subacromial injection. He is still having significant difficulty raising his left arm as well as pain. Review of Systems:   Negative for fever or chills. Negative for numbness or tingling left upper extremity.     Past Medical History:   Diagnosis Date    Anxiety     Arthritis     Diabetes mellitus ([address]     High blood pressure     [place] (hard of hearing)     Hyperlipidemia     Irregular heart beat     Wears glasses     Wears hearing aid in both ears     Wears partial dentures        Family History   Problem Relation Age of Onset    No Known Problems Mother     No Known Problems Father     Heart Disease Brother     Heart Disease Brother        Past Surgical History:   Procedure Laterality Date    COLONOSCOPY      CYST REMOVAL Left 5/21/15    FOOT SURGERY Left     cyst removed from foot    JOINT REPLACEMENT Right     shoulder    LUMBAR SPINE SURGERY Bilateral 6/12/2019    BILATERAL L4 TRANSFORAMINAL EPIDURAL STEROID INJECTION WITH FLUOROSCOPY performed by Mamie Torrez MD at [address] [place] [address] Bilateral 6/26/2019    BILATERAL L4 TRANSFORAMINAL EPIDURAL STEROID INJECTION WITH FLUOROSCOPY performed by Mamie Torrez MD at [address] [place] [address] N/A 10/14/2019    MICROLUMBAR LAMINECTOMY L4-5, 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, performed by Sanjuana Claude, MD at [address] [place] [address],[place] [address]      tumor removed from neck  SHOULDER ARTHROPLASTY Right 2/17/15    SHOULDER SURGERY Bilateral     WRIST SURGERY         Social History     Occupational History    Occupation: Retired   Tobacco Use    Smoking status: Former Smoker     Quit date: 1975     Years since quittin.6    Smokeless tobacco: Never Used   Substance and Sexual Activity    Alcohol use: No    Drug use: No    Sexual activity: Not Currently       Current Outpatient Medications   Medication Sig Dispense Refill    naproxen (NAPROSYN) 500 MG tablet Take 1 tablet by mouth 2 times daily (with meals) 60 tablet 0    rosuvastatin (CRESTOR) 20 MG tablet Take 20 mg by mouth every other day      empagliflozin (JARDIANCE) 10 MG tablet Take 10 mg by mouth daily      naproxen (NAPROSYN) 500 MG tablet Take 1 tablet by mouth 2 times daily (with meals) for 14 days 28 tablet 0    pioglitazone (ACTOS) 30 MG tablet Take 30 mg by mouth daily      atorvastatin (LIPITOR) 80 MG tablet Take 80 mg by mouth nightly      Multiple Vitamins-Minerals (MULTIVITAMIN ADULT PO) Take 1 tablet by mouth daily      zolpidem (AMBIEN) 10 MG tablet Take by mouth nightly.  ibuprofen (ADVIL;MOTRIN) 200 MG tablet Take 200 mg by mouth every 6 hours as needed for Pain      metFORMIN (GLUCOPHAGE) 1000 MG tablet Take 1,000 mg by mouth 2 times daily (with meals)      valsartan-hydrochlorothiazide (DIOVAN-HCT) 160-12.5 MG per tablet Take 1 tablet by mouth daily.  glipiZIDE (GLUCOTROL) 5 MG tablet Take 5 mg by mouth.  sertraline (ZOLOFT) 25 MG tablet Take 25 mg by mouth nightly.  Omega-3 Fatty Acids (FISH OIL) 1000 MG CPDR   Take 1,000 mg by mouth 2 times daily       aspirin 81 MG tablet Take 81 mg by mouth daily. No current facility-administered medications for this visit. Objective:   He is alert, oriented x 3, pleasant, well nourished, developed and in no acute distress. Temp 97.1 °F (36.2 °C) (Temporal)      Lumbar Spine Exam:  Deformity: Absent . Soft Tissue Swelling: Absent . Soft Tissue Tenderness: Absent . Midline Bone Tenderness:Absent . Paraspinal Muscular Spasm: Present. Previous Incisions: Present. Erythema Absent . Lumbar Flexion does produce pain. Lumbar Extension does produce pain.     Motor Exam:                              Strength Scale: 1-5    Extensor Hallucis Longus L5 Anterior Tibialis   L4 Quadriceps   L2,3,4    Right             4+            4+         3   Left             4+            4+         4+      Reflex Exam: 0-4+    Achilles Tendon (gastroc)   S1 Patellar Tendon (quads)   L4   Right                 0              0   Left                 0              0      SHOULDER EXAM:  Examination of the left shoulder shows: There is no deformity. There is no erythema. There is no  soft tissue swelling. Deltoid region is  tender to palpation. AC Joint is not tender to palpation. Clavicle is not tender to palpation. Bicipital Groove is not  tender to palpation. Pectoralis  is not tender to palpation. Scapula/ trapezius is not tender to palpation. There is marked weakness with supraspinatus testing. There is moderate pain with supraspinatus testing. Yergason Test negative. Drop Arm Test negative. Apprehension Test negative. Cross Arm Test negative. Shoulder AROM-      FF 60  ABD 60 IR to ASIS ER 20        X Rays: not performed in the office today:     Diagnosis:        ICD-10-CM    1. Rotator cuff arthropathy of left shoulder  M12.812 MRI SHOULDER LEFT WO CONTRAST   2. Spinal stenosis of lumbar region with neurogenic claudication  M48.062         Assessment/ Plan:      Approved low back pain and radicular complaints. He probably has significant rotator cuff tear with mild arthritis. The natural history of the patient's diagnosis as well as the treatment options were discussed in full and questions were answered. Risks and benefits of the treatment options also reviewed in detail. MRI of the left shoulder to evaluate rotator cuff integrity and to plan surgical options. Follow Up: After MRI to review test results. Call or return to clinic prn if these symptoms worsen or fail to improve as anticipated.

## 2020-12-17 ENCOUNTER — HOSPITAL ENCOUNTER (OUTPATIENT)
Dept: PHYSICAL THERAPY | Age: 80
Setting detail: THERAPIES SERIES
Discharge: HOME | End: 2020-12-17
Payer: MEDICARE

## 2020-12-17 PROCEDURE — 97110 THERAPEUTIC EXERCISES: CPT

## 2020-12-17 PROCEDURE — 97112 NEUROMUSCULAR REEDUCATION: CPT

## 2020-12-17 NOTE — FLOWSHEET NOTE
[facility]  Outpatient Physical Therapy  Phone: [phone]   Fax: [phone]    Physical Therapy Daily Treatment Note/Progress Report   Date:  2020    Patient Name:  Aamir Moser    :  1940  MRN: [id number]  Medical/Treatment Diagnosis Information:  · Diagnosis: Neuropathy G62.9  · Treatment Diagnosis: Impaired balance, decreased ankle/hip/knee strength and stability, increased fall risk  Insurance/Certification information:  PT Insurance Information: Medicare  Physician Information:  Referring Practitioner: Pippa Hudson  Plan of care signed (Y/N): []  Yes [x]  No     Date of Patient follow up with Physician:      Progress Report: []  Yes  [x]  No     Date Range for reporting period:  Beginnin20  Endin20     Progress report due (10 Rx/or 30 days whichever is less): visit #25 or /    Recertification due (POC duration/ or 90 days whichever is less):  POC duration       Visit # Insurance Allowable Auth required? Date Range    +   10/8-   + 3/12  [place] []  Yes  [x]  No       Latex Allergy:  [x]NO      []YES  Preferred Language for Healthcare:   [x]English       []other:    Functional Scale:        Date assessed:  LEFS: raw score = 28; dysfunction 60-79%   10/23     Pain level:  5/10   L shoulder pain,  R quad;   0/10 in low back     SUBJECTIVE:   Has an MRI scheduled for Saturday for the shoulder/  Said the back area of the leg now gets to hurting, in hamstring area. OBJECTIVE: ,   Pt required physical assist to lift RLE onto bike/nustep today. Multiple attempts  And significant compensatory patterns for sit to/from stand. :   Pt required mod - max  A to perform sit to stand today mostly due to L shoulder and R quad pain, and overall LE weakness. No orders in system yet for therapy. Advised pt. Dr. aWlter Paris wanted RTC in 6 weeks.               Gait:  Ambulates with a RW for first time since fall Transfers:   Mod A, max difficulty with max forward trunk lean to perform sit to/from stand    PROM AROM     L R L R   Hip Flexion           Hip Abduction           Hip ER           Hip IR           Knee Flexion WNL WNL WNL  WNL   Knee Extension WNL WNL 0 0   Shoulder Scaption     100* with pain       Shoulder Abd      n/t     Shoulder IR      WFL with pain       Shoulder ER      n/t            Strength (0-5) / Myotomes Left Right   Hip Flexion - supine       Hip Flexion - seated (L1-2) 4+ 4-   Hip Abduction 4- 3+   Hip Adduction       Hip ER 4+ 4+   Hip IR 4 4   Quads (L2-4) 3+ 3-   Hamstrings 5 5   Ankle Dorsiflexion (L4-5) 4- 4+   Shoulder Scaption 2+      Shoulder Abd       Shoulder ER  2-     Shoulder IR  3+             Flexibility       Hamstrings (90/90) WFL WFL     RESTRICTIONS/PRECAUTIONS:   FALL RISK     Exercises/Interventions:     Therapeutic Exercises (08136) Resistance / level Sets/sec Reps Notes   Scifit/Nustep  X 5 mins , Level 4       HS Stretch     IB, HR/TR    Mini squat at counter            12/1  placed chair behind pt,  suggested he do this when doing squat at home     Mat:  SL B  Hip flexion/Heel slides   Clamshell - hooklying   Clamshell - sidelying   Knee exten - pillow b/t knees  Quad sets    Hip Abduction supine with slide board  Bridging with SB under ankles      Lime  Lime       2    10\"   X 20 with SB  X 15  X 20 R  X 10   x 15     x 20   10           12/17  Done with russian     T.G. Squats       Step-Ups Fwd 4\"  X 10 B    Step-Downs  10/19 Added, with difficulty, use of // bars    Supine SLR Flexion    SLR Flexion Sidelying  1# R   L 3    2 X 10     10     12/14  AAROM for RLE     SAQ 2# L, 0# R 2 X 10 B 10/1 Added  12/14 AAROM needed for RLE    Clamshells hooklying  Lime9/22 Added   Bridging     LAQ  L   R 3 X 10 B 9/22 Added, partial range for RLE   12/8  AAROM for RLE    [facility] TKE  9/25 Added          [employer] Walkouts 4-way  9/28 Added                  [employer]: Leg Press  HS Curls        TRX Squats    Added 11/10       Attempted, could not lift weight, switched back to LAQ with 1/2#                 LTR  SKTC  Seated trunk flexion edge of mat      1                                 Therapeutic Activities (24933)              Lateral Band Walk 10/9 Added    Partial        Sit to stands from edge of mat    10/5 Attempted, unable, try at future visits   10/16 with PT assist          Partial Mini Squats from high mat table, eccentric lower to pillow on mat, RW support  X 1510/9 Added, mod verbal/tactile cues for form           Gait training on grey mats in // bars    10/23 Added, unsupported    Ramps up/down with SPC by [employer] court     10/26 Added  11/3  Descended forward, ascended backward  With [facility]              10/30 tried, pt apprehensive       10  10  10   Gravity Minimized due to wekaness in anti- gravity position          Neuromuscular Re-ed (64896)       [facility] e-stim with quad sets, duty cycle 50%, 10/20 cycle time, 60 pps burst frequency---59 mA CC   X 10 mins    Airex Tandem Balance   9/22 Added   SLS Airex   9/25 Added, occ UE support    Wobbleboard carpet       [facility] Lunges Fwd  With 5\" hold  X 15 B 9/22 Added   10/5  Mild collapse RLE today   Hip Hikes, lateral (foot flat) 10/5 Added    Alt Toe taps standing on Airex  9/25 Added, unsupported    Shuttle Step-Up to [employer]  Shuttle Hip Abduction  Shuttle Marching  Added 10/1          [employer]               1/2 wedge DF, PF static standing   10/19 Added    Hand to opp knee grey mat with ambulation      10/23 Added                  Manual Intervention (86101)       STM lumbar paraspinals, hamstring/pifiromis stretch                                              Pt. Education: 12/1  Discussed with pt Importance of safety,  Pt does have raised toilet seat,  suggested he use to minimize stress on L shoulder as well as make sit to stand easier/safer.   Also discussed getting electric recliner to assist with sit to stand -patient educated on diagnosis, prognosis and expectations for rehab  -all patient questions were answered    Home Exercise Program:  Pt has handout including ankle pumps, seated Hip flexion, SLR, mini squats he is currently doing given by home health PT  Discussed and demonstrated this date      Therapeutic Exercise and NMR EXR  [x] (23611) Provided verbal/tactile cueing for activities related to strengthening, flexibility, endurance, ROM for improvements in  [x] LE / Lumbar: LE, proximal hip, and core control with self care, mobility, lifting, ambulation. [] UE / Cervical: cervical, postural, scapular, scapulothoracic and UE control with self care, reaching, carrying, lifting, house/yardwork, driving, computer work.  [] (40947) Provided verbal/tactile cueing for activities related to improving balance, coordination, kinesthetic sense, posture, motor skill, proprioception to assist with   [] LE / lumbar: LE, proximal hip, and core control in self care, mobility, lifting, ambulation and eccentric single leg control. [] UE / cervical: cervical, scapular, scapulothoracic and UE control with self care, reaching, carrying, lifting, house/yardwork, driving, computer work.   [] (71688) Therapist is in constant attendance of 2 or more patients providing skilled therapy interventions, but not providing any significant amount of measurable one-on-one time to either patient, for improvements in  [] LE / lumbar: LE, proximal hip, and core control in self care, mobility, lifting, ambulation and eccentric single leg control. [] UE / cervical: cervical, scapular, scapulothoracic and UE control with self care, reaching, carrying, lifting, house/yardwork, driving, computer work.      NMR and Therapeutic Activities: [] (15221 or 28628) Provided verbal/tactile cueing for activities related to improving balance, coordination, kinesthetic sense, posture, motor skill, proprioception and motor activation to allow for proper function of   [] LE: / Lumbar core, proximal hip and LE with self care and ADLs  [] UE / Cervical: cervical, postural, scapular, scapulothoracic and UE control with self care, carrying, lifting, driving, computer work.   [] (90063) Gait Re-education- Provided training and instruction to the patient for proper LE, core and proximal hip recruitment and positioning and eccentric body weight control with ambulation re-education including up and down stairs     Home Management Training / Self Care:  [x] (10099) Provided self-care/home management training related to activities of daily living and compensatory training, and/or use of adaptive equipment for improvement with: ADLs and compensatory training, meal preparation, safety procedures and instruction in use of adaptive equipment, including bathing, grooming, dressing, personal hygiene, basic household cleaning and chores.      Home Exercise Program:    [] (06353) Reviewed/Progressed HEP activities related to strengthening, flexibility, endurance, ROM of   [] LE / Lumbar: core, proximal hip and LE for functional self-care, mobility, lifting and ambulation/stair navigation   [] UE / Cervical: cervical, postural, scapular, scapulothoracic and UE control with self care, reaching, carrying, lifting, house/yardwork, driving, computer work  [] (97987)Reviewed/Progressed HEP activities related to improving balance, coordination, kinesthetic sense, posture, motor skill, proprioception of   [] LE: core, proximal hip and LE for self care, mobility, lifting, and ambulation/stair navigation    [] UE / Cervical: cervical, postural,  scapular, scapulothoracic and UE control with self care, reaching, carrying, lifting, house/yardwork, driving, computer work Manual Treatments:  PROM / STM / Oscillations-Mobs:  G-I, II, III, IV (PA's, Inf., Post.)  [] (97613) Provided manual therapy to mobilize LE, proximal hip and/or LS spine soft tissue/joints for the purpose of modulating pain, promoting relaxation,  increasing ROM, reducing/eliminating soft tissue swelling/inflammation/restriction, improving soft tissue extensibility and allowing for proper ROM for normal function with   [] LE / lumbar: self care, mobility, lifting and ambulation. [] UE / Cervical: self care, reaching, carrying, lifting, house/yardwork, driving, computer work. Modalities:  [] (36500) Vasopneumatic compression: Utilized vasopneumatic compression to decrease edema / swelling for the purpose of improving mobility and quad tone / recruitment which will allow for increased overall function including but not limited to self-care, transfers, ambulation, and ascending / descending stairs. Modalities:  12/8  E-stim IFC  to left shoulder with CP in semi-reclined position in supine with roll under knees x 15'    11/20 IFC to low back with HP and grey bolster x 15 mins     Charges:  Timed Code Treatment Minutes: 55   Total Treatment Minutes: 55     [] EVAL - LOW (54096)   [] EVAL - MOD (19880)  [] EVAL - HIGH (79001)  [] RE-EVAL (34437)  [x] WH(27715) x 2              [] Ionto  [x] NMR (76942) x 2       [] Vaso  [] Manual (78276) x       [] Ultrasound  [] TA x 1       [] Mech Traction (79807)  [] Aquatic Therapy x      [x] ES (un) (26278):   [] Home Management Training x 1 [] ES(attended) (04053)   [] Dry Needling 1-2 muscles (48604):  [] Dry Needling 3+ muscles (988926)  [] Group:      [] Other:     GOALS: Patient stated goal: strengthen legs to do stuff, such as golfing if able   [x]? Progressing: []? Met: []? Not Met: []? Adjusted     Therapist goals for Patient:   Short Term Goals:  To be achieved in: 2 weeks 1. Independent in HEP and progression per patient tolerance, in order to prevent re-injury. [x]? Progressing: []? Met: []? Not Met: []? Adjusted  2. Patient will have a decrease in pain to facilitate improvement in movement, function, and ADLs as indicated by Functional Deficits. [x]? Progressing: []? Met: []? Not Met: []? Adjusted     Long Term Goals: To be achieved in: 6 weeks  1. Disability index score of 30% or less for the LEFS to assist with reaching prior level of function. [x]? Progressing: []? Met: []? Not Met: []? Adjusted  3. Patient will demonstrate an increase in Strength to at least 4/5 in RLE as well as good proximal hip strength and control to allow for proper functional mobility as indicated by patients Functional Deficits. [x]? Progressing: []? Met: []? Not Met: []? Adjusted  4. Patient will return to functional activities including ambulating 3-4 blocks or around [facility] by patient's home without increased symptoms or restriction to resume PLOF--Said he hasn't tried to walk around the mall yet    [x]? Progressing: []? Met: []? Not Met: []? Adjusted  5. Pt will reduce risk for falls by improving semi-tandem balance to 30 secs/ NEW GOAL:   Pt will improve tandem balance to 30 secs and/or perform 30 sec sit to stand test with good mechanics to help reduce risk for falls   []? Progressing: [x]? Met: []? Not Met: [x]? Adjusted         Overall Progression Towards Functional goals/ Treatment Progress Update:  [x] Patient is progressing as expected towards functional goals listed. [] Progression is slowed due to complexities/Impairments listed. [] Progression has been slowed due to co-morbidities.   [] Plan just implemented, too soon to assess goals progression <30days   [] Goals require adjustment due to lack of progress  [] Patient is not progressing as expected and requires additional follow up with physician  [] Other    Persisting Functional Limitations/Impairments: []Sleeping []Sitting               [x]Standing [x]Transfers        [x]Walking []Kneeling               [x]Stairs [x]Squatting / bending   []ADLs [x]Reaching  [x]Lifting  [x]Housework  []Driving []Job related tasks  []Sports/Recreation []Other:        ASSESSMENT:   Introduced russian e-stim with quad sets and AAROM SAQ in order to help promote improved VMO contractility and muscle activation. After Bel[place]ian, pt able to achive minimally improved long arc range of motion, and better stability and eccentric control with partial mini squats/sit to stands. Continue focus on NMR for quad control to help promote improved RLE stability and to reduce fall risk. Treatment/Activity Tolerance:  [x] Patient able to complete tx  [] Patient limited by fatigue  [] Patient limited by pain  [] Patient limited by other medical complications  [] Other:     Prognosis: [x] Good [] Fair  [] Poor    Patient Requires Follow-up: [x] Yes  [] No    Plan for next treatment session:  Balance, LE strengthening. Will progress/update with shoulder exercises once eval is completed     PLAN: See eval. PT 2x / week for 6 weeks. Cont 2x/week x 4-6 weeks    [x] Continue per plan of care [] Alter current plan (see comments)  [] Plan of care initiated [] Hold pending MD visit [] Discharge    Electronically signed by: Marylou Arias PT       Note: If patient does not return for scheduled/ recommended follow up visits, this note will serve as a discharge from care along with most recent update on progress.

## 2020-12-21 ENCOUNTER — HOSPITAL ENCOUNTER (OUTPATIENT)
Dept: PHYSICAL THERAPY | Age: 80
Setting detail: THERAPIES SERIES
Discharge: HOME | End: 2020-12-21
Payer: MEDICARE

## 2020-12-21 PROCEDURE — 97110 THERAPEUTIC EXERCISES: CPT

## 2020-12-21 PROCEDURE — 97112 NEUROMUSCULAR REEDUCATION: CPT

## 2020-12-21 NOTE — FLOWSHEET NOTE
Pt. Education: 12/1  Discussed with pt Importance of safety,  Pt does have raised toilet seat,  suggested he use to minimize stress on L shoulder as well as make sit to stand easier/safer. Also discussed getting electric recliner to assist with sit to stand    -patient educated on diagnosis, prognosis and expectations for rehab  -all patient questions were answered    Home Exercise Program:  Pt has handout including ankle pumps, seated Hip flexion, SLR, mini squats he is currently doing given by home health PT  Discussed and demonstrated this date      Therapeutic Exercise and NMR EXR  [x] (40225) Provided verbal/tactile cueing for activities related to strengthening, flexibility, endurance, ROM for improvements in  [x] LE / Lumbar: LE, proximal hip, and core control with self care, mobility, lifting, ambulation. [] UE / Cervical: cervical, postural, scapular, scapulothoracic and UE control with self care, reaching, carrying, lifting, house/yardwork, driving, computer work.  [] (25474) Provided verbal/tactile cueing for activities related to improving balance, coordination, kinesthetic sense, posture, motor skill, proprioception to assist with   [] LE / lumbar: LE, proximal hip, and core control in self care, mobility, lifting, ambulation and eccentric single leg control. [] UE / cervical: cervical, scapular, scapulothoracic and UE control with self care, reaching, carrying, lifting, house/yardwork, driving, computer work.   [] (50377) Therapist is in constant attendance of 2 or more patients providing skilled therapy interventions, but not providing any significant amount of measurable one-on-one time to either patient, for improvements in  [] LE / lumbar: LE, proximal hip, and core control in self care, mobility, lifting, ambulation and eccentric single leg control. [] LE: core, proximal hip and LE for self care, mobility, lifting, and ambulation/stair navigation    [] UE / Cervical: cervical, postural,  scapular, scapulothoracic and UE control with self care, reaching, carrying, lifting, house/yardwork, driving, computer work    Manual Treatments:  PROM / STM / Oscillations-Mobs:  G-I, II, III, IV (PA's, Inf., Post.)  [] (40809) Provided manual therapy to mobilize LE, proximal hip and/or LS spine soft tissue/joints for the purpose of modulating pain, promoting relaxation,  increasing ROM, reducing/eliminating soft tissue swelling/inflammation/restriction, improving soft tissue extensibility and allowing for proper ROM for normal function with   [] LE / lumbar: self care, mobility, lifting and ambulation. [] UE / Cervical: self care, reaching, carrying, lifting, house/yardwork, driving, computer work. Modalities:  [] (02872) Vasopneumatic compression: Utilized vasopneumatic compression to decrease edema / swelling for the purpose of improving mobility and quad tone / recruitment which will allow for increased overall function including but not limited to self-care, transfers, ambulation, and ascending / descending stairs.        Modalities:  12/8  E-stim IFC  to left shoulder with CP in semi-reclined position in supine with roll under knees x 15'    11/20 IFC to low back with HP and grey bolster x 15 mins     Charges:  Timed Code Treatment Minutes: 55   Total Treatment Minutes: 55     [] EVAL - LOW (00184)   [] EVAL - MOD (11863)  [] EVAL - HIGH (32086)  [] RE-EVAL (50535)  [x] TF(85853) x 2              [] Ionto  [x] NMR (26475) x 2       [] Vaso  [] Manual (59900) x       [] Ultrasound  [] TA x 1       [] Mech Traction (91935)  [] Aquatic Therapy x      [] ES (un) (29510):   [] Home Management Training x 1 [] ES(attended) (33631)   [] Dry Needling 1-2 muscles (78578):  [] Dry Needling 3+ muscles (779665)  [] Group:      [] Other: GOALS: Patient stated goal: strengthen legs to do stuff, such as golfing if able   [x]? Progressing: []? Met: []? Not Met: []? Adjusted     Therapist goals for Patient:   Short Term Goals: To be achieved in: 2 weeks  1. Independent in HEP and progression per patient tolerance, in order to prevent re-injury. [x]? Progressing: []? Met: []? Not Met: []? Adjusted  2. Patient will have a decrease in pain to facilitate improvement in movement, function, and ADLs as indicated by Functional Deficits. [x]? Progressing: []? Met: []? Not Met: []? Adjusted     Long Term Goals: To be achieved in: 6 weeks  1. Disability index score of 30% or less for the LEFS to assist with reaching prior level of function. [x]? Progressing: []? Met: []? Not Met: []? Adjusted  3. Patient will demonstrate an increase in Strength to at least 4/5 in RLE as well as good proximal hip strength and control to allow for proper functional mobility as indicated by patients Functional Deficits. [x]? Progressing: []? Met: []? Not Met: []? Adjusted  4. Patient will return to functional activities including ambulating 3-4 blocks or around [facility] by patient's home without increased symptoms or restriction to resume PLOF--Said he hasn't tried to walk around the mall yet    [x]? Progressing: []? Met: []? Not Met: []? Adjusted  5. Pt will reduce risk for falls by improving semi-tandem balance to 30 secs/ NEW GOAL:   Pt will improve tandem balance to 30 secs and/or perform 30 sec sit to stand test with good mechanics to help reduce risk for falls   []? Progressing: [x]? Met: []? Not Met: [x]? Adjusted         Overall Progression Towards Functional goals/ Treatment Progress Update:  [x] Patient is progressing as expected towards functional goals listed. [] Progression is slowed due to complexities/Impairments listed. [] Progression has been slowed due to co-morbidities.   [] Plan just implemented, too soon to assess goals progression <30days [] Goals require adjustment due to lack of progress  [] Patient is not progressing as expected and requires additional follow up with physician  [] Other    Persisting Functional Limitations/Impairments:  []Sleeping []Sitting               [x]Standing [x]Transfers        [x]Walking []Kneeling               [x]Stairs [x]Squatting / bending   []ADLs [x]Reaching  [x]Lifting  [x]Housework  []Driving []Job related tasks  []Sports/Recreation []Other:        ASSESSMENT:   Continued with [place] e-stim for improved VMO contractility and muscle activation. Continue focus on NMR for quad control to help promote improved RLE stability and to reduce fall risk. Treatment/Activity Tolerance:  [x] Patient able to complete tx  [] Patient limited by fatigue  [] Patient limited by pain  [] Patient limited by other medical complications  [] Other:     Prognosis: [x] Good [] Fair  [] Poor    Patient Requires Follow-up: [x] Yes  [] No    Plan for next treatment session:  Balance, LE strengthening. Will progress/update with shoulder exercises once eval is completed     PLAN: See eval. PT 2x / week for 6 weeks. Cont 2x/week x 4-6 weeks    [x] Continue per plan of care [] Alter current plan (see comments)  [] Plan of care initiated [] Hold pending MD visit [] Discharge    Electronically signed by: Micha Edmond PT       Note: If patient does not return for scheduled/ recommended follow up visits, this note will serve as a discharge from care along with most recent update on progress.

## 2020-12-23 ENCOUNTER — HOSPITAL ENCOUNTER (OUTPATIENT)
Dept: PHYSICAL THERAPY | Age: 80
Setting detail: THERAPIES SERIES
Discharge: HOME | End: 2020-12-23
Payer: MEDICARE

## 2020-12-23 ENCOUNTER — TELEPHONE (OUTPATIENT)
Dept: ORTHOPEDIC SURGERY | Age: 80
End: 2020-12-23

## 2020-12-23 PROCEDURE — 97110 THERAPEUTIC EXERCISES: CPT

## 2020-12-23 PROCEDURE — 97530 THERAPEUTIC ACTIVITIES: CPT

## 2020-12-23 PROCEDURE — 97112 NEUROMUSCULAR REEDUCATION: CPT

## 2020-12-23 NOTE — FLOWSHEET NOTE
[facility]  Outpatient Physical Therapy  Phone: [phone]   Fax: [phone]    Physical Therapy Daily Treatment Note/Progress Report   Date:  2020    Patient Name:  Aamir Moser    :  1940  MRN: [id number]  Medical/Treatment Diagnosis Information:  · Diagnosis: Neuropathy G62.9  · Treatment Diagnosis: Impaired balance, decreased ankle/hip/knee strength and stability, increased fall risk  Insurance/Certification information:  PT Insurance Information: Medicare    Physician Information:  Referring Practitioner: Pippa Hudson  Plan of care signed (Y/N): []  Yes [x]  No     Date of Patient follow up with Physician:      Progress Report: []  Yes  [x]  No     Date Range for reporting period:  Beginnin20  Endin20     Progress report due (10 Rx/or 30 days whichever is less): visit #47 or     Recertification due (POC duration/ or 90 days whichever is less):  POC duration       Visit # Insurance Allowable Auth required? Date Range    +   10/8-12   +   [place] []  Yes  [x]  No       Latex Allergy:  [x]NO      []YES  Preferred Language for Healthcare:   [x]English       []other:    Functional Scale:        Date assessed:  LEFS: raw score = 28; dysfunction 60-79%   10/23     Pain level:  5/10   L shoulder pain,  R quad;   0/10 in low back     SUBJECTIVE:   Says the shoulder is doing pretty good, leg is the same as last visit                OBJECTIVE:   ,   Pt required physical assist to lift RLE onto bike/nustep today. Multiple attempts  And significant compensatory patterns for sit to/from stand. :   Pt required mod - max  A to perform sit to stand today mostly due to L shoulder and R quad pain, and overall LE weakness. No orders in system yet for therapy. Advised pt. Dr. Walter Paris wanted RTC in 6 weeks.               Gait:  Ambulates with a RW for first time since fall Transfers:   Mod A, max difficulty with max forward trunk lean to perform sit to/from stand    PROM AROM     L R L R   Hip Flexion           Hip Abduction           Hip ER           Hip IR           Knee Flexion WNL WNL WNL  WNL   Knee Extension WNL WNL 0 0   Shoulder Scaption     100* with pain       Shoulder Abd      n/t     Shoulder IR      WFL with pain       Shoulder ER      n/t            Strength (0-5) / Myotomes Left Right   Hip Flexion - supine       Hip Flexion - seated (L1-2) 4+ 4-   Hip Abduction 4- 3+   Hip Adduction       Hip ER 4+ 4+   Hip IR 4 4   Quads (L2-4) 3+ 3-   Hamstrings 5 5   Ankle Dorsiflexion (L4-5) 4- 4+   Shoulder Scaption 2+      Shoulder Abd       Shoulder ER  2-     Shoulder IR  3+             Flexibility       Hamstrings (90/90) WFL WFL     RESTRICTIONS/PRECAUTIONS:   FALL RISK     Exercises/Interventions:     Therapeutic Exercises (75149) Resistance / level Sets/sec Reps Notes   Scifit/Nustep  X 3 mins , Level 4       HS Stretch     IB, HR/TR    Mini squat at counter            12/1  placed chair behind pt,  suggested he do this when doing squat at home     Mat:  SL B  Bridging with Ball Squeeze  Hip flexion/Heel slides   Clamshell - hooklying   Clamshell - sidelying   Knee exten - pillow b/t knees  Quad sets    Hip Abduction supine with slide board  Bridging with SB under ankles        Lime  Lime   2    2       10   X 20 with SB    X 20 R  X 10     x 20              12/17  Done with russian     T.G. Squats       Step-Ups Fwd       Step-Downs  10/19 Added, with difficulty, use of // bars    Supine SLR Flexion            SLR Flexion Sidelying  0# R   L 2            2 X 10             10     12/14, 12/21 AAROM for RLE  12/23  Less assistance needed     SAQ 2# L, 0# R 2 X 10 B 10/1 Added  12/14 AAROM needed for RLE    Clamshells hooklying  Lime9/22 Added   Bridging     LAQ 3# L   R 3 X 10 B 9/22 Added, partial range for RLE   12/8  AAROM for RLE    [facility] TKE  9/25 Added Cable Column        [employer] 4-way  9/28 Added                  [employer]:    Leg Press  HS Curls        TRX Squats    Added 11/10       Attempted, could not lift weight, switched back to LAQ with 1/2#                 LTR  SKTC  Seated trunk flexion edge of mat      1                                 Therapeutic Activities (27000)              Lateral Band Walk 10/9 Added    Partial        Sit to stands from edge of mat    10/5 Attempted, unable, try at future visits   10/16 with PT assist          Partial Mini Squats from high mat table, eccentric lower to pillow on mat, RW support  2X 1012/21- CGA/MIN A    10/9 Added, mod verbal/tactile cues for form    Fwd lunges onto 6\" step with RW support   X 10 B    Alt toe taps  6\"   X 15 B With RW                 Gait training on grey mats in // bars    10/23 Added, unsupported    Ramps up/down with SPC by [place]     10/26 Added  11/3  Descended forward, ascended backward  With [facility]              10/30 tried, pt apprehensive        Gravity Minimized due to wekaness in anti- gravity position          Neuromuscular Re-ed (83995)       [facility] e-stim with quad sets, duty cycle 50%, 10/20 cycle time, 60 pps burst frequency---44-59 mA CC   X 10 mins    [employer] Tandem Balance   9/22 Added   SLS Airex   9/25 Added, occ UE support    Wobbleboard carpet       [facility] Lunges Fwd  9/22 Added   10/5  Mild collapse RLE today   Hip Hikes, lateral (foot flat) 10/5 Added    Alt Toe taps standing on Airex  9/25 Added, unsupported    Shuttle Step-Up to [employer]  Shuttle Hip Abduction  Shuttle Marching  Added 10/1          [employer]               1/2 wedge DF, PF static standing   10/19 Added    Hand to opp knee grey mat with ambulation      10/23 Added                  Manual Intervention (24444)       STM lumbar paraspinals, hamstring/pifiromis stretch                                                 Shoulder        Supine wand Chest Press   X 10     Supine Wand Shoulder Flexion    X 10 Pt. Education: 12/1  Discussed with pt Importance of safety,  Pt does have raised toilet seat,  suggested he use to minimize stress on L shoulder as well as make sit to stand easier/safer. Also discussed getting electric recliner to assist with sit to stand    -patient educated on diagnosis, prognosis and expectations for rehab  -all patient questions were answered    Home Exercise Program:  Pt has handout including ankle pumps, seated Hip flexion, SLR, mini squats he is currently doing given by home health PT  Discussed and demonstrated this date      Therapeutic Exercise and NMR EXR  [x] (94313) Provided verbal/tactile cueing for activities related to strengthening, flexibility, endurance, ROM for improvements in  [x] LE / Lumbar: LE, proximal hip, and core control with self care, mobility, lifting, ambulation. [] UE / Cervical: cervical, postural, scapular, scapulothoracic and UE control with self care, reaching, carrying, lifting, house/yardwork, driving, computer work.  [] (47837) Provided verbal/tactile cueing for activities related to improving balance, coordination, kinesthetic sense, posture, motor skill, proprioception to assist with   [] LE / lumbar: LE, proximal hip, and core control in self care, mobility, lifting, ambulation and eccentric single leg control. [] UE / cervical: cervical, scapular, scapulothoracic and UE control with self care, reaching, carrying, lifting, house/yardwork, driving, computer work.   [] (80052) Therapist is in constant attendance of 2 or more patients providing skilled therapy interventions, but not providing any significant amount of measurable one-on-one time to either patient, for improvements in  [] LE / lumbar: LE, proximal hip, and core control in self care, mobility, lifting, ambulation and eccentric single leg control. [] UE / cervical: cervical, scapular, scapulothoracic and UE control with self care, reaching, carrying, lifting, house/yardwork, driving, computer work. NMR and Therapeutic Activities:    [] (67669 or 99682) Provided verbal/tactile cueing for activities related to improving balance, coordination, kinesthetic sense, posture, motor skill, proprioception and motor activation to allow for proper function of   [] LE: / Lumbar core, proximal hip and LE with self care and ADLs  [] UE / Cervical: cervical, postural, scapular, scapulothoracic and UE control with self care, carrying, lifting, driving, computer work.   [] (60231) Gait Re-education- Provided training and instruction to the patient for proper LE, core and proximal hip recruitment and positioning and eccentric body weight control with ambulation re-education including up and down stairs     Home Management Training / Self Care:  [x] (33831) Provided self-care/home management training related to activities of daily living and compensatory training, and/or use of adaptive equipment for improvement with: ADLs and compensatory training, meal preparation, safety procedures and instruction in use of adaptive equipment, including bathing, grooming, dressing, personal hygiene, basic household cleaning and chores.      Home Exercise Program:    [] (61719) Reviewed/Progressed HEP activities related to strengthening, flexibility, endurance, ROM of   [] LE / Lumbar: core, proximal hip and LE for functional self-care, mobility, lifting and ambulation/stair navigation   [] UE / Cervical: cervical, postural, scapular, scapulothoracic and UE control with self care, reaching, carrying, lifting, house/yardwork, driving, computer work  [] (31478)Reviewed/Progressed HEP activities related to improving balance, coordination, kinesthetic sense, posture, motor skill, proprioception of [] LE: core, proximal hip and LE for self care, mobility, lifting, and ambulation/stair navigation    [] UE / Cervical: cervical, postural,  scapular, scapulothoracic and UE control with self care, reaching, carrying, lifting, house/yardwork, driving, computer work    Manual Treatments:  PROM / STM / Oscillations-Mobs:  G-I, II, III, IV (PA's, Inf., Post.)  [] (53781) Provided manual therapy to mobilize LE, proximal hip and/or LS spine soft tissue/joints for the purpose of modulating pain, promoting relaxation,  increasing ROM, reducing/eliminating soft tissue swelling/inflammation/restriction, improving soft tissue extensibility and allowing for proper ROM for normal function with   [] LE / lumbar: self care, mobility, lifting and ambulation. [] UE / Cervical: self care, reaching, carrying, lifting, house/yardwork, driving, computer work. Modalities:  [] (23158) Vasopneumatic compression: Utilized vasopneumatic compression to decrease edema / swelling for the purpose of improving mobility and quad tone / recruitment which will allow for increased overall function including but not limited to self-care, transfers, ambulation, and ascending / descending stairs.        Modalities:  12/8  E-stim IFC  to left shoulder with CP in semi-reclined position in supine with roll under knees x 15'    11/20 IFC to low back with HP and grey bolster x 15 mins     Charges:  Timed Code Treatment Minutes: 58   Total Treatment Minutes: 58     [] EVAL - LOW (09512)   [] EVAL - MOD (73742)  [] EVAL - HIGH (62442)  [] RE-EVAL (82518)  [x] RO(46103) x 2              [] Ionto  [x] NMR (15584) x 1       [] Vaso  [] Manual (67409) x       [] Ultrasound  [x] TA x 1       [] Mech Traction (11238)  [] Aquatic Therapy x      [] ES (un) (30602):   [] Home Management Training x 1 [] ES(attended) (22402)   [] Dry Needling 1-2 muscles (88215):  [] Dry Needling 3+ muscles (842894)  [] Group:      [] Other: GOALS: Patient stated goal: strengthen legs to do stuff, such as golfing if able   [x]? Progressing: []? Met: []? Not Met: []? Adjusted     Therapist goals for Patient:   Short Term Goals: To be achieved in: 2 weeks  1. Independent in HEP and progression per patient tolerance, in order to prevent re-injury. [x]? Progressing: []? Met: []? Not Met: []? Adjusted  2. Patient will have a decrease in pain to facilitate improvement in movement, function, and ADLs as indicated by Functional Deficits. [x]? Progressing: []? Met: []? Not Met: []? Adjusted     Long Term Goals: To be achieved in: 6 weeks  1. Disability index score of 30% or less for the LEFS to assist with reaching prior level of function. [x]? Progressing: []? Met: []? Not Met: []? Adjusted  3. Patient will demonstrate an increase in Strength to at least 4/5 in RLE as well as good proximal hip strength and control to allow for proper functional mobility as indicated by patients Functional Deficits. [x]? Progressing: []? Met: []? Not Met: []? Adjusted  4. Patient will return to functional activities including ambulating 3-4 blocks or around [facility] by patient's home without increased symptoms or restriction to resume PLOF--Said he hasn't tried to walk around the mall yet    [x]? Progressing: []? Met: []? Not Met: []? Adjusted  5. Pt will reduce risk for falls by improving semi-tandem balance to 30 secs/ NEW GOAL:   Pt will improve tandem balance to 30 secs and/or perform 30 sec sit to stand test with good mechanics to help reduce risk for falls   []? Progressing: [x]? Met: []? Not Met: [x]? Adjusted         Overall Progression Towards Functional goals/ Treatment Progress Update:  [x] Patient is progressing as expected towards functional goals listed. [] Progression is slowed due to complexities/Impairments listed. [] Progression has been slowed due to co-morbidities.   [] Plan just implemented, too soon to assess goals progression <30days [] Goals require adjustment due to lack of progress  [] Patient is not progressing as expected and requires additional follow up with physician  [] Other    Persisting Functional Limitations/Impairments:  []Sleeping []Sitting               [x]Standing [x]Transfers        [x]Walking []Kneeling               [x]Stairs [x]Squatting / bending   []ADLs [x]Reaching  [x]Lifting  [x]Housework  []Driving []Job related tasks  []Sports/Recreation []Other:        ASSESSMENT:   Pt able to display improved quad mechanics in antigravity positions when performing SLR in supine today, with less extensor lag and less assistance required. Continued with [place] e-stim for improved VMO contractility and muscle activation. Continue focus on NMR for quad control to help promote improved RLE stability and to reduce fall risk. Also discussed pt's shoulder HEP and pt demonstrated good knowledge and compliance with performing them. Still doesn't want to do a lot of shoulder rehab at this time, has call into ortho's office for MRI results. Continue to work on aggressive R quad proprioception and strengthening as tolerated to improve pt's ability to more safely perform transfers and reduce fall risk. Treatment/Activity Tolerance:  [x] Patient able to complete tx  [] Patient limited by fatigue  [] Patient limited by pain  [] Patient limited by other medical complications  [] Other:     Prognosis: [x] Good [] Fair  [] Poor    Patient Requires Follow-up: [x] Yes  [] No    Plan for next treatment session:  Balance, LE strengthening. Will progress/update with shoulder exercises once eval is completed     PLAN: See eval. PT 2x / week for 6 weeks.   Cont 2x/week x 4-6 weeks    [x] Continue per plan of care [] Alter current plan (see comments)  [] Plan of care initiated [] Hold pending MD visit [] Discharge    Electronically signed by: Micha Edmond PT Note: If patient does not return for scheduled/ recommended follow up visits, this note will serve as a discharge from care along with most recent update on progress.

## 2020-12-28 ENCOUNTER — HOSPITAL ENCOUNTER (OUTPATIENT)
Dept: PHYSICAL THERAPY | Age: 80
Setting detail: THERAPIES SERIES
Discharge: HOME | End: 2020-12-28
Payer: MEDICARE

## 2020-12-28 PROCEDURE — 97530 THERAPEUTIC ACTIVITIES: CPT

## 2020-12-28 PROCEDURE — 97110 THERAPEUTIC EXERCISES: CPT

## 2020-12-28 PROCEDURE — 97112 NEUROMUSCULAR REEDUCATION: CPT

## 2020-12-28 NOTE — FLOWSHEET NOTE
[facility]  Outpatient Physical Therapy  Phone: [phone]   Fax: [phone]    Physical Therapy Daily Treatment Note/Progress Report   Date:  2020    Patient Name:  Chata Osborn    :  1940  MRN: [id number]  Medical/Treatment Diagnosis Information:  · Diagnosis: Neuropathy G62.9  · Treatment Diagnosis: Impaired balance, decreased ankle/hip/knee strength and stability, increased fall risk  Insurance/Certification information:  PT Insurance Information: Medicare    Physician Information:  Referring Practitioner: Isabel American  Plan of care signed (Y/N): []  Yes [x]  No     Date of Patient follow up with Physician:      Progress Report: []  Yes  [x]  No     Date Range for reporting period:  Beginnin20  Endin20     Progress report due (10 Rx/or 30 days whichever is less): visit #69 or     Recertification due (POC duration/ or 90 days whichever is less):  POC duration       Visit # Insurance Allowable Auth required? Date Range    +   10/8-12   +   [place] []  Yes  [x]  No       Latex Allergy:  [x]NO      []YES  Preferred Language for Healthcare:   [x]English       []other:    Functional Scale:        Date assessed:  LEFS: raw score = 28; dysfunction 60-79%   10/23     Pain level:  5/10   L shoulder pain,  R quad;   0/10 in low back     SUBJECTIVE:   More pain overall, says it's due to the colder weather. Took some Ibuprofen around. Legs have been hurting some, but not like the usual.  Not many noticeable improvements over the last couple weeks. Shoulder still giving him ROM problems. Overall wears out very easily. OBJECTIVE:   ,   Pt required physical assist to lift RLE onto bike/nustep today. Multiple attempts  And significant compensatory patterns for sit to/from stand. 12/1:   Pt required mod - max  A to perform sit to stand today mostly due to L shoulder and R quad pain, and overall LE weakness. No orders in system yet for therapy. Advised pt. Dr. Ary Rosa wanted RTC in 6 weeks. 12/4   Gait:  Ambulates with a RW for first time since fall  Transfers: Mod A, max difficulty with max forward trunk lean to perform sit to/from stand    PROM AROM     L R L R   Hip Flexion           Hip Abduction           Hip ER           Hip IR           Knee Flexion WNL WNL WNL  WNL   Knee Extension WNL WNL 0 0   Shoulder Scaption     100* with pain       Shoulder Abd      n/t     Shoulder IR      WFL with pain       Shoulder ER      n/t            Strength (0-5) / Myotomes Left Right   Hip Flexion - supine       Hip Flexion - seated (L1-2) 4+ 4-   Hip Abduction 4- 3+   Hip Adduction       Hip ER 4+ 4+   Hip IR 4 4   Quads (L2-4) 3+ 3-   Hamstrings 5 5   Ankle Dorsiflexion (L4-5) 4- 4+   Shoulder Scaption 2+      Shoulder Abd       Shoulder ER  2-     Shoulder IR  3+             Flexibility       Hamstrings (90/90) WFL WFL     EMG done 9/8/20:  Study is consistent with mild sensorimotor peripheral neuropathy   most likely secondary to diabetes. Decreased insertional activity   in right lumbar paraspinals most likely secondary to prior   surgery. Decreased voluntary motor units of right vastus medialis   is of uncertain significance, may be related to prior lumbar   surgery or to the peripheral neuropathy.  No evidence of an acute   radiculopathy at this time      RESTRICTIONS/PRECAUTIONS:   FALL RISK     Exercises/Interventions:     Therapeutic Exercises (33699) Resistance / level Sets/sec Reps Notes   Scifit/Nustep  X 3 mins , Level 4       HS Stretch     IB, HR/TR    Mini squat at counter            12/1  placed chair behind pt,  suggested he do this when doing squat at home     Mat:  SL B  Bridging with Ball Squeeze  Hip flexion/Heel slides   Clamshell - hooklying Clamshell - sidelying   Knee exten - pillow b/t knees  Quad sets    Hip Abduction supine with slide board  Bridging with SB under ankles   Sidelying Hip Abduction       Lime  Blue   2    2    2   10   X 20 with SB    X 20 R  X 10     x 20   10           12/17  Done with russian     T.G. Squats       Step-Ups Fwd 4\"  X 10 B    Step-Downs  10/19 Added, with difficulty, use of // bars    Supine SLR Flexion            SLR Flexion Sidelying  0# R   L 2            2 X 10             10     12/14, 12/21 AAROM for RLE  12/23  Less assistance needed     SAQ 2# L, 0# R 2 X 10 B 10/1 Added  12/14 AAROM needed for RLE    Clamshells hooklying  9/22 Added   Bridging     LAQ 3# L   R 3 X 10 B 9/22 Added, partial range for RLE   12/8  AAROM for RLE    CKC TKE  9/25 Added          Cable Column        Cable Walkouts 4-way  9/28 Added                  [facility]:    Leg Press  HS Curls        TRX Squats    Added 11/10       Attempted, could not lift weight, switched back to LAQ with 1/2#                 LTR  SKTC  Seated trunk flexion edge of mat      1                                 Therapeutic Activities (76483)              Lateral Band Walk 10/9 Added    Partial        Sit to stands from edge of mat    10/5 Attempted, unable, try at future visits   10/16 with PT assist          Partial Mini Squats from high mat table, eccentric lower to pillow on mat, RW support  2X 1012/21- CGA/MIN A    10/9 Added, mod verbal/tactile cues for form    Fwd lunges onto 6\" step with RW support       Alt toe taps  4\"\"   X 15 B                  Gait training on grey mats in // bars    10/23 Added, unsupported    Ramps up/down with SPC by basketball court     10/26 Added  11/3  Descended forward, ascended backward  With [facility]              10/30 tried, pt apprehensive        Gravity Minimized due to wekaness in anti- gravity position          Neuromuscular Re-ed (83854) [place] e-stim with quad sets, duty cycle 50%, 10/20 cycle time, 60 pps burst frequency---44-59 mA CC   X 10 mins    Airex Tandem Balance   9/22 Added   SLS Airex   9/25 Added, occ UE support    Wobbleboard carpet       BOSU Lunges Fwd  9/22 Added   10/5  Mild collapse RLE today   Hip Hikes, lateral (foot flat) 10/5 Added    Alt Toe taps standing on Airex  9/25 Added, unsupported    Shuttle Step-Up to [employer]  Shuttle Hip Abduction  Shuttle Marching  Added 10/1          [employer]               1/2 wedge DF, PF static standing   10/19 Added    Hand to opp knee grey mat with ambulation      10/23 Added                  Manual Intervention (95424)       STM lumbar paraspinals, hamstring/pifiromis stretch                                                 Shoulder        Supine wand Chest Press   X 10     Supine Wand Shoulder Flexion    X 10                Pt. Education:     12/1  Discussed with pt Importance of safety,  Pt does have raised toilet seat,  suggested he use to minimize stress on L shoulder as well as make sit to stand easier/safer. Also discussed getting electric recliner to assist with sit to stand    -patient educated on diagnosis, prognosis and expectations for rehab  -all patient questions were answered    Home Exercise Program:  Pt has handout including ankle pumps, seated Hip flexion, SLR, mini squats he is currently doing given by home health PT  Discussed and demonstrated this date      Therapeutic Exercise and NMR EXR  [x] (02810) Provided verbal/tactile cueing for activities related to strengthening, flexibility, endurance, ROM for improvements in  [x] LE / Lumbar: LE, proximal hip, and core control with self care, mobility, lifting, ambulation. [] UE / Cervical: cervical, postural, scapular, scapulothoracic and UE control with self care, reaching, carrying, lifting, house/yardwork, driving, computer work. [] (78519) Provided verbal/tactile cueing for activities related to improving balance, coordination, kinesthetic sense, posture, motor skill, proprioception to assist with   [] LE / lumbar: LE, proximal hip, and core control in self care, mobility, lifting, ambulation and eccentric single leg control. [] UE / cervical: cervical, scapular, scapulothoracic and UE control with self care, reaching, carrying, lifting, house/yardwork, driving, computer work.   [] (56489) Therapist is in constant attendance of 2 or more patients providing skilled therapy interventions, but not providing any significant amount of measurable one-on-one time to either patient, for improvements in  [] LE / lumbar: LE, proximal hip, and core control in self care, mobility, lifting, ambulation and eccentric single leg control. [] UE / cervical: cervical, scapular, scapulothoracic and UE control with self care, reaching, carrying, lifting, house/yardwork, driving, computer work.      NMR and Therapeutic Activities:    [] (26487 or 69882) Provided verbal/tactile cueing for activities related to improving balance, coordination, kinesthetic sense, posture, motor skill, proprioception and motor activation to allow for proper function of   [] LE: / Lumbar core, proximal hip and LE with self care and ADLs  [] UE / Cervical: cervical, postural, scapular, scapulothoracic and UE control with self care, carrying, lifting, driving, computer work.   [] (31798) Gait Re-education- Provided training and instruction to the patient for proper LE, core and proximal hip recruitment and positioning and eccentric body weight control with ambulation re-education including up and down stairs     Home Management Training / Self Care: [] (38557) Vasopneumatic compression: Utilized vasopneumatic compression to decrease edema / swelling for the purpose of improving mobility and quad tone / recruitment which will allow for increased overall function including but not limited to self-care, transfers, ambulation, and ascending / descending stairs. Modalities:  12/8  E-stim IFC  to left shoulder with CP in semi-reclined position in supine with roll under knees x 15'    11/20 IFC to low back with HP and grey bolster x 15 mins     Charges:  Timed Code Treatment Minutes: 48   Total Treatment Minutes: 48     [] EVAL - LOW (55827)   [] EVAL - MOD (74003)  [] EVAL - HIGH (44885)  [] RE-EVAL (89053)  [x] LINK(35697) x 1              [] Ionto  [x] NMR (72503) x 1       [] Vaso  [] Manual (28735) x       [] Ultrasound  [x] TA x 1       [] Mech Traction (10665)  [] Aquatic Therapy x      [] ES (un) (98926):   [] Home Management Training x 1 [] ES(attended) (95508)   [] Dry Needling 1-2 muscles (88674):  [] Dry Needling 3+ muscles (429681)  [] Group:      [] Other:     GOALS: Patient stated goal: strengthen legs to do stuff, such as golfing if able   [x]? Progressing: []? Met: []? Not Met: []? Adjusted     Therapist goals for Patient:   Short Term Goals: To be achieved in: 2 weeks  1. Independent in HEP and progression per patient tolerance, in order to prevent re-injury. [x]? Progressing: []? Met: []? Not Met: []? Adjusted  2. Patient will have a decrease in pain to facilitate improvement in movement, function, and ADLs as indicated by Functional Deficits. [x]? Progressing: []? Met: []? Not Met: []? Adjusted     Long Term Goals: To be achieved in: 6 weeks  1. Disability index score of 30% or less for the LEFS to assist with reaching prior level of function. [x]? Progressing: []? Met: []? Not Met: []?  Adjusted 3. Patient will demonstrate an increase in Strength to at least 4/5 in RLE as well as good proximal hip strength and control to allow for proper functional mobility as indicated by patients Functional Deficits. [x]? Progressing: []? Met: []? Not Met: []? Adjusted  4. Patient will return to functional activities including ambulating 3-4 blocks or around [facility] by patient's home without increased symptoms or restriction to resume PLOF--Said he hasn't tried to walk around the mall yet    [x]? Progressing: []? Met: []? Not Met: []? Adjusted  5. Pt will reduce risk for falls by improving semi-tandem balance to 30 secs/ NEW GOAL:   Pt will improve tandem balance to 30 secs and/or perform 30 sec sit to stand test with good mechanics to help reduce risk for falls   []? Progressing: [x]? Met: []? Not Met: [x]? Adjusted         Overall Progression Towards Functional goals/ Treatment Progress Update:  [x] Patient is progressing as expected towards functional goals listed. [] Progression is slowed due to complexities/Impairments listed. [] Progression has been slowed due to co-morbidities. [] Plan just implemented, too soon to assess goals progression <30days   [] Goals require adjustment due to lack of progress  [] Patient is not progressing as expected and requires additional follow up with physician  [] Other    Persisting Functional Limitations/Impairments:  []Sleeping []Sitting               [x]Standing [x]Transfers        [x]Walking []Kneeling               [x]Stairs [x]Squatting / bending   []ADLs [x]Reaching  [x]Lifting  [x]Housework  []Driving []Job related tasks  []Sports/Recreation []Other:        ASSESSMENT:   Still needs slow progression of CKC due to frequent buckling and safety concerns. Emphasis remains on vastus medialis strengthening and NMR of said muscle to improve motor recruitment and nerve function towards improving tolerance to standing and transfers. Treatment/Activity Tolerance:  [x] Patient able to complete tx  [] Patient limited by fatigue  [] Patient limited by pain  [] Patient limited by other medical complications  [] Other:     Prognosis: [x] Good [] Fair  [] Poor    Patient Requires Follow-up: [x] Yes  [] No    Plan for next treatment session:  Balance, LE strengthening. Will progress/update with shoulder exercises once eval is completed     PLAN: See eval. PT 2x / week for 6 weeks. Cont 2x/week x 4-6 weeks    [x] Continue per plan of care [] Alter current plan (see comments)  [] Plan of care initiated [] Hold pending MD visit [] Discharge    Electronically signed by: Santo Melvin PT       Note: If patient does not return for scheduled/ recommended follow up visits, this note will serve as a discharge from care along with most recent update on progress.

## 2020-12-28 NOTE — TELEPHONE ENCOUNTER
Left a message letting the patient know that he can make an appointment to go over the [address] [place] [address].

## 2020-12-30 ENCOUNTER — HOSPITAL ENCOUNTER (OUTPATIENT)
Dept: PHYSICAL THERAPY | Age: 80
Setting detail: THERAPIES SERIES
Discharge: HOME | End: 2020-12-30
Payer: MEDICARE

## 2020-12-30 PROCEDURE — 97140 MANUAL THERAPY 1/> REGIONS: CPT

## 2020-12-30 PROCEDURE — 97110 THERAPEUTIC EXERCISES: CPT

## 2020-12-30 PROCEDURE — 97112 NEUROMUSCULAR REEDUCATION: CPT

## 2020-12-30 PROCEDURE — 97530 THERAPEUTIC ACTIVITIES: CPT

## 2020-12-30 NOTE — FLOWSHEET NOTE
[facility]  Outpatient Physical Therapy  Phone: [phone]   Fax: [phone]    Physical Therapy Daily Treatment Note/Progress Report   Date:  2020    Patient Name:  Bala Story    :  1940  MRN: [id number]  Medical/Treatment Diagnosis Information:  · Diagnosis: Neuropathy G62.9  · Treatment Diagnosis: Impaired balance, decreased ankle/hip/knee strength and stability, increased fall risk  Insurance/Certification information:  PT Insurance Information: Medicare    Physician Information:  Referring Practitioner: Jolanta Jhaveri  Plan of care signed (Y/N): []  Yes [x]  No     Date of Patient follow up with Physician:      Progress Report: []  Yes  [x]  No     Date Range for reporting period:  Beginnin20  Endin20     Progress report due (10 Rx/or 30 days whichever is less): visit #94 or     Recertification due (POC duration/ or 90 days whichever is less):  POC duration       Visit # Insurance Allowable Auth required? Date Range    +   10/8-   +   [place] []  Yes  [x]  No       Latex Allergy:  [x]NO      []YES  Preferred Language for Healthcare:   [x]English       []other:    Functional Scale:        Date assessed:  LEFS: raw score = 28; dysfunction 60-79%   10/23     Pain level:  5/10 L shoulder pain,  R quad;   0/10 in low back     SUBJECTIVE: Pt reports that he continues to have most pain in his shoulder vs leg. Reports quad soreness, but no pain. Notices that the knot in his quad has been improving and getting smaller since rolling his cane over it and massaging it. States compliance with HEP. OBJECTIVE:   ,   Pt required physical assist to lift RLE onto bike/nustep today. Multiple attempts  And significant compensatory patterns for sit to/from stand. 12/1:   Pt required mod - max  A to perform sit to stand today mostly due to L shoulder and R quad pain, and overall LE weakness. No orders in system yet for therapy. Advised pt. Dr. Stella Cartwright wanted RTC in 6 weeks. 12/4   Gait:  Ambulates with a RW for first time since fall  Transfers: Mod A, max difficulty with max forward trunk lean to perform sit to/from stand    PROM AROM     L R L R   Hip Flexion           Hip Abduction           Hip ER           Hip IR           Knee Flexion WNL WNL WNL  WNL   Knee Extension WNL WNL 0 0   Shoulder Scaption     100* with pain       Shoulder Abd      n/t     Shoulder IR      WFL with pain       Shoulder ER      n/t            Strength (0-5) / Myotomes Left Right   Hip Flexion - supine       Hip Flexion - seated (L1-2) 4+ 4-   Hip Abduction 4- 3+   Hip Adduction       Hip ER 4+ 4+   Hip IR 4 4   Quads (L2-4) 3+ 3-   Hamstrings 5 5   Ankle Dorsiflexion (L4-5) 4- 4+   Shoulder Scaption 2+      Shoulder Abd       Shoulder ER  2-     Shoulder IR  3+             Flexibility       Hamstrings (90/90) WFL WFL     EMG done 9/8/20:  Study is consistent with mild sensorimotor peripheral neuropathy   most likely secondary to diabetes. Decreased insertional activity   in right lumbar paraspinals most likely secondary to prior   surgery. Decreased voluntary motor units of right vastus medialis   is of uncertain significance, may be related to prior lumbar   surgery or to the peripheral neuropathy.  No evidence of an acute   radiculopathy at this time      RESTRICTIONS/PRECAUTIONS:   FALL RISK     Exercises/Interventions:     Therapeutic Exercises (91031) Resistance / level Sets/sec Reps Notes   Scifit/Nustep  X 3 mins , Level 5       HS Stretch     IB, HR/TR    Mini squat at counter            12/1  placed chair behind pt,  suggested he do this when doing squat at home     Mat:  SL B  Bridging with Ball Squeeze  Hip flexion/Heel slides   Clamshell - hooklying Clamshell - sidelying   Knee exten - pillow b/t knees  Quad sets    Hip Abduction supine with slide board  Bridging with SB under ankles   Sidelying Hip Abduction       Lime  Blue   2        2   10       X 20 B       x 20   10           12/17  Done with russian     T.G. Squats       Step-Ups Fwd 4\"  X 20 B    Step-Downs  10/19 Added, with difficulty, use of // bars    Supine SLR Flexion            SLR Flexion Sidelying  0# R   L 2             X 10                  12/14, 12/21, 12/30 AAROM for RLE  12/23  Less assistance needed     SAQ  10/1 Added  12/14 AAROM needed for RLE    Clamshells hooklying  9/22 Added   Bridging     LAQ 3# L   R 3 X 10 B 9/22 Added, partial range for RLE   12/8  AAROM for RLE    CKC TKE  9/25 Added          Cable Column        Cable Walkouts 4-way  9/28 Added                  [employer]:    Leg Press  HS Curls        TRX Squats    Added 11/10       Attempted, could not lift weight, switched back to LAQ with 1/2#                 LTR  SKTC  Seated trunk flexion edge of mat      1                                 Therapeutic Activities (92553)              Lateral Band Walk X 4 laps 10/9 Added    Partial        Sit to stands from edge of mat No UE use  X 10 12/30 cues for eccentric control  10/5 Attempted, unable, try at future visits   10/16 with PT assist          Mini Squats to chair in // bars 2X 1012/30 TC's for weight shift 12/21- CGA/MIN A   Fwd lunges onto 6\" step with RW support       Alt toe taps  4\"  X 15 B                  Gait training on grey mats in // bars    10/23 Added, unsupported    Ramps up/down with SPC by basketball court     10/26 Added  11/3  Descended forward, ascended backward  With [employer]   Gait training with [employer]   x1 lap 12/30 CGA-SBA       10/30 tried, pt apprehensive        Gravity Minimized due to wekaness in anti- gravity position          Neuromuscular Re-ed (03977) [place] e-stim with quad sets, duty cycle 50%, 10/20 cycle time, 60 pps burst frequency---44-59 mA CC   X 10 mins    Airex Tandem Balance   30\" x 29/22 Added   SLS Airex   9/25 Added, occ UE support    Wobbleboard carpet       BOSU Lunges Fwd  9/22 Added   10/5  Mild collapse RLE today   Hip Hikes, lateral (foot flat) 10/5 Added    Alt Toe taps standing on Airex  9/25 Added, unsupported    Shuttle Step-Up to [employer]  Shuttle Hip Abduction  Shuttle Marching  Added 10/1          [employer]               1/2 wedge DF, PF static standing   10/19 Added    Hand to opp knee grey mat with ambulation      10/23 Added    Jessi ambulation In // bars  x4 Added 12/30 trial with SPC          Manual Intervention (03034)       STM lumbar paraspinals, hamstring/pifiromis stretch       Theraroller/STM to rectus fem  x8 mins                                         Shoulder        Supine wand Chest Press   X 10     Supine Wand Shoulder Flexion    X 10                Pt. Education:     12/1  Discussed with pt Importance of safety,  Pt does have raised toilet seat,  suggested he use to minimize stress on L shoulder as well as make sit to stand easier/safer. Also discussed getting electric recliner to assist with sit to stand    -patient educated on diagnosis, prognosis and expectations for rehab  -all patient questions were answered    Home Exercise Program:  Pt has handout including ankle pumps, seated Hip flexion, SLR, mini squats he is currently doing given by home health PT  Discussed and demonstrated this date      Therapeutic Exercise and NMR EXR  [x] (18869) Provided verbal/tactile cueing for activities related to strengthening, flexibility, endurance, ROM for improvements in  [x] LE / Lumbar: LE, proximal hip, and core control with self care, mobility, lifting, ambulation. [] UE / Cervical: cervical, postural, scapular, scapulothoracic and UE control with self care, reaching, carrying, lifting, house/yardwork, driving, computer work. [] (98440) Provided verbal/tactile cueing for activities related to improving balance, coordination, kinesthetic sense, posture, motor skill, proprioception to assist with   [] LE / lumbar: LE, proximal hip, and core control in self care, mobility, lifting, ambulation and eccentric single leg control. [] UE / cervical: cervical, scapular, scapulothoracic and UE control with self care, reaching, carrying, lifting, house/yardwork, driving, computer work.   [] (36924) Therapist is in constant attendance of 2 or more patients providing skilled therapy interventions, but not providing any significant amount of measurable one-on-one time to either patient, for improvements in  [] LE / lumbar: LE, proximal hip, and core control in self care, mobility, lifting, ambulation and eccentric single leg control. [] UE / cervical: cervical, scapular, scapulothoracic and UE control with self care, reaching, carrying, lifting, house/yardwork, driving, computer work.      NMR and Therapeutic Activities:    [] (56794 or 56995) Provided verbal/tactile cueing for activities related to improving balance, coordination, kinesthetic sense, posture, motor skill, proprioception and motor activation to allow for proper function of   [] LE: / Lumbar core, proximal hip and LE with self care and ADLs  [] UE / Cervical: cervical, postural, scapular, scapulothoracic and UE control with self care, carrying, lifting, driving, computer work.   [] (92545) Gait Re-education- Provided training and instruction to the patient for proper LE, core and proximal hip recruitment and positioning and eccentric body weight control with ambulation re-education including up and down stairs     Home Management Training / Self Care: [] (53045) Vasopneumatic compression: Utilized vasopneumatic compression to decrease edema / swelling for the purpose of improving mobility and quad tone / recruitment which will allow for increased overall function including but not limited to self-care, transfers, ambulation, and ascending / descending stairs. Modalities:  12/8  E-stim IFC  to left shoulder with CP in semi-reclined position in supine with roll under knees x 15'    11/20 IFC to low back with HP and grey bolster x 15 mins     Charges:  Timed Code Treatment Minutes: 88   Total Treatment Minutes: 88     [] EVAL - LOW (00899)   [] EVAL - MOD (52225)  [] EVAL - HIGH (83108)  [] RE-EVAL (70221)  [x] IC(92757) x 2              [] Ionto  [x] NMR (84376) x 2       [] Vaso  [x] Manual (87393) x 1      [] Ultrasound  [x] TA x 1       [] Mech Traction (95851)  [] Aquatic Therapy x      [] ES (un) (28799):   [] Home Management Training x 1 [] ES(attended) (14386)   [] Dry Needling 1-2 muscles (69311):  [] Dry Needling 3+ muscles (274407)  [] Group:      [] Other:     GOALS: Patient stated goal: strengthen legs to do stuff, such as golfing if able   [x]? Progressing: []? Met: []? Not Met: []? Adjusted     Therapist goals for Patient:   Short Term Goals: To be achieved in: 2 weeks  1. Independent in HEP and progression per patient tolerance, in order to prevent re-injury. [x]? Progressing: []? Met: []? Not Met: []? Adjusted  2. Patient will have a decrease in pain to facilitate improvement in movement, function, and ADLs as indicated by Functional Deficits. [x]? Progressing: []? Met: []? Not Met: []? Adjusted     Long Term Goals: To be achieved in: 6 weeks  1. Disability index score of 30% or less for the LEFS to assist with reaching prior level of function. [x]? Progressing: []? Met: []? Not Met: []?  Adjusted 3. Patient will demonstrate an increase in Strength to at least 4/5 in RLE as well as good proximal hip strength and control to allow for proper functional mobility as indicated by patients Functional Deficits. [x]? Progressing: []? Met: []? Not Met: []? Adjusted  4. Patient will return to functional activities including ambulating 3-4 blocks or around [facility] by patient's home without increased symptoms or restriction to resume PLOF--Said he hasn't tried to walk around the mall yet    [x]? Progressing: []? Met: []? Not Met: []? Adjusted  5. Pt will reduce risk for falls by improving semi-tandem balance to 30 secs/ NEW GOAL:   Pt will improve tandem balance to 30 secs and/or perform 30 sec sit to stand test with good mechanics to help reduce risk for falls   []? Progressing: [x]? Met: []? Not Met: [x]? Adjusted         Overall Progression Towards Functional goals/ Treatment Progress Update:  [x] Patient is progressing as expected towards functional goals listed. [] Progression is slowed due to complexities/Impairments listed. [] Progression has been slowed due to co-morbidities.   [] Plan just implemented, too soon to assess goals progression <30days   [] Goals require adjustment due to lack of progress  [] Patient is not progressing as expected and requires additional follow up with physician  [] Other    Persisting Functional Limitations/Impairments:  []Sleeping []Sitting               [x]Standing [x]Transfers        [x]Walking []Kneeling               [x]Stairs [x]Squatting / bending   []ADLs [x]Reaching  [x]Lifting  [x]Housework  []Driving []Job related tasks  []Sports/Recreation []Other: ASSESSMENT:  Pt continues to display impaired R LE motor control and strength, although pt demonstrating improved eccentric control with stand<>sit today. Pt has tendency to hyperextend R knee in static stance and through stance phase of gait for improved balance and secondary to fear of falling. Emphasis remains on rectus fem and vmo strengthening and NMR to improve overall activity tolerance, gait mechanics and transfers. Treatment/Activity Tolerance:  [x] Patient able to complete tx  [] Patient limited by fatigue  [] Patient limited by pain  [] Patient limited by other medical complications  [] Other:     Prognosis: [x] Good [] Fair  [] Poor    Patient Requires Follow-up: [x] Yes  [] No    Plan for next treatment session:  Balance, LE strengthening. Will progress/update with shoulder exercises once eval is completed     PLAN: See eval. PT 2x / week for 6 weeks. Cont 2x/week x 4-6 weeks    [x] Continue per plan of care [] Alter current plan (see comments)  [] Plan of care initiated [] Hold pending MD visit [] Discharge    Electronically signed by: Sabrina Lane, PTA [id number]      Note: If patient does not return for scheduled/ recommended follow up visits, this note will serve as a discharge from care along with most recent update on progress.

## 2021-01-04 ENCOUNTER — HOSPITAL ENCOUNTER (OUTPATIENT)
Dept: PHYSICAL THERAPY | Age: 81
Setting detail: THERAPIES SERIES
Discharge: HOME | End: 2021-01-04
Payer: MEDICARE

## 2021-01-04 PROCEDURE — 97112 NEUROMUSCULAR REEDUCATION: CPT

## 2021-01-04 PROCEDURE — 97110 THERAPEUTIC EXERCISES: CPT

## 2021-01-04 PROCEDURE — 97140 MANUAL THERAPY 1/> REGIONS: CPT

## 2021-01-04 NOTE — FLOWSHEET NOTE
[facility]  Outpatient Physical Therapy  Phone: [phone]   Fax: [phone]    Physical Therapy Daily Treatment Note/Progress Report   Date:  2021    Patient Name:  Marimar Zimmerman    :  1940  MRN: [id number]  Medical/Treatment Diagnosis Information:  · Diagnosis: Neuropathy G62.9  · Treatment Diagnosis: Impaired balance, decreased ankle/hip/knee strength and stability, increased fall risk  Insurance/Certification information:  PT Insurance Information: Medicare    Physician Information:  Referring Practitioner: Aurea Smith  Plan of care signed (Y/N): [x]  Yes []  No     Date of Patient follow up with Physician:      Progress Report: [x]  Yes  []  No     Date Range for reporting period:  Beginnin20  Endin20     Progress report due (10 Rx/or 30 days whichever is less): visit #86 or     Recertification due (POC duration/ or 90 days whichever is less):  POC duration       Visit # Insurance Allowable Auth required? Date Range    +   10/8-   +   [place] []  Yes  [x]  No       Latex Allergy:  [x]NO      []YES  Preferred Language for Healthcare:   [x]English       []other:    Functional Scale:        Date assessed:  LEFS: raw score = 28; dysfunction 60-79%   10/23     Pain level:  3/10 L shoulder pain with medication, 0/10 R quad;   0/10 in low back     SUBJECTIVE: Pt reports that his leg is feeling pretty good but \"my arm is giving me a fit. \" He will follow up with MD about shoulder on . Pt states he has not yet [place]. OBJECTIVE:   ,   Pt required physical assist to lift RLE onto bike/nustep today. Multiple attempts  And significant compensatory patterns for sit to/from stand. :   Pt required mod - max  A to perform sit to stand today mostly due to L shoulder and R quad pain, and overall LE weakness. No orders in system yet for therapy. Advised pt. Dr. Cherri Gandhi wanted RTC in 6 weeks. 12/4   Gait:  Ambulates with a RW for first time since fall  Transfers: Mod A, max difficulty with max forward trunk lean to perform sit to/from stand    PROM AROM     L R L R   Hip Flexion           Hip Abduction           Hip ER           Hip IR           Knee Flexion WNL WNL WNL  WNL   Knee Extension WNL WNL 0 0   Shoulder Scaption     100* with pain       Shoulder Abd      n/t     Shoulder IR      WFL with pain       Shoulder ER      n/t            Strength (0-5) / Myotomes Left Right   Hip Flexion - supine       Hip Flexion - seated (L1-2) 4+ 4-   Hip Abduction 4- 3+   Hip Adduction       Hip ER 4+ 4+   Hip IR 4 4   Quads (L2-4) 3+ 3-   Hamstrings 5 5   Ankle Dorsiflexion (L4-5) 4- 4+   Shoulder Scaption 2+      Shoulder Abd       Shoulder ER  2-     Shoulder IR  3+             Flexibility       Hamstrings (90/90) WFL WFL     EMG done 9/8/20:  Study is consistent with mild sensorimotor peripheral neuropathy   most likely secondary to diabetes. Decreased insertional activity   in right lumbar paraspinals most likely secondary to prior   surgery. Decreased voluntary motor units of right vastus medialis   is of uncertain significance, may be related to prior lumbar   surgery or to the peripheral neuropathy. No evidence of an acute   radiculopathy at this time    1/4:   Gait: Ambulating with RW with forward flexed trunk   Transfers: CGA to Rubina with correct hand placement for safety, but pt does avoid bending L knee during standing.  Also has tendency to abandon walker   Unable to perform 30 second sit to stand test as patient's R shoulder is too aggravated  Strength (0-5) / Myotomes Left Right   Hip Flexion - supine       Hip Flexion - seated (L1-2) 4+ 4-   Hip Abduction - seated 4+ 4+   Hip Abduction - sidelying  4- 3+    Hip ER 4+ 4+   Hip IR 4+ 4+   Quads (L2-4) 3+ 3-   Hamstrings 5 5   Ankle Dorsiflexion (L4-5) 4- 4   Shoulder Scaption 2+      Shoulder Abd       Shoulder ER  2- *     Shoulder IR  4- *   Gait training on grey mats in // bars    10/23 Added, unsupported    Ramps up/down with SPC by basketball court     10/26 Added  11/3  Descended forward, ascended backward  With [facility]   Gait training with [facility]  12/30 CGA-SBA       10/30 tried, pt apprehensive        Gravity Minimized due to wekaness in anti- gravity position          Neuromuscular Re-ed (55764)       [facility] quad sets, duty cycle 50%, 10/20 cycle time, 60 pps burst frequency---44-59 mA CC   X 10 mins    Airex Tandem Balance   9/22 Added   SLS Airex   9/25 Added, occ UE support    Wobbleboard carpet       [facility] Lunges Fwd  9/22 Added   10/5  Mild collapse RLE today   Hip Hikes, lateral (foot flat) 10/5 Added    Alt Toe taps standing on Airex  9/25 Added, unsupported    Shuttle Step-Up to [employer]  Shuttle Hip Abduction  Shuttle Marching  Added 10/1          Biodex               1/2 wedge DF, PF static standing   10/19 Added    Hand to opp knee grey mat with ambulation      10/23 Added    Jessi ambulation In // bars Added 12/30 trial with SPC          Manual Intervention (88007)       STM lumbar paraspinals, hamstring/pifiromis stretch       Theraroller/STM to R rectus fem  x8 mins                                         Shoulder        Supine wand Chest Press   X 10     Supine Wand Shoulder Flexion   3 X 10                Pt. Education:     12/1  Discussed with pt Importance of safety,  Pt does have raised toilet seat,  suggested he use to minimize stress on L shoulder as well as make sit to stand easier/safer.   Also discussed getting electric recliner to assist with sit to stand    -patient educated on diagnosis, prognosis and expectations for rehab  -all patient questions were answered    Home Exercise Program:  Pt has handout including ankle pumps, seated Hip flexion, SLR, mini squats he is currently doing given by home health PT  Discussed and demonstrated this date      Therapeutic Exercise and NMR EXR [x] (61336) Provided verbal/tactile cueing for activities related to strengthening, flexibility, endurance, ROM for improvements in  [x] LE / Lumbar: LE, proximal hip, and core control with self care, mobility, lifting, ambulation. [] UE / Cervical: cervical, postural, scapular, scapulothoracic and UE control with self care, reaching, carrying, lifting, house/yardwork, driving, computer work. [x] (77993) Provided verbal/tactile cueing for activities related to improving balance, coordination, kinesthetic sense, posture, motor skill, proprioception to assist with   [x] LE / lumbar: LE, proximal hip, and core control in self care, mobility, lifting, ambulation and eccentric single leg control. [] UE / cervical: cervical, scapular, scapulothoracic and UE control with self care, reaching, carrying, lifting, house/yardwork, driving, computer work.   [] (84517) Therapist is in constant attendance of 2 or more patients providing skilled therapy interventions, but not providing any significant amount of measurable one-on-one time to either patient, for improvements in  [] LE / lumbar: LE, proximal hip, and core control in self care, mobility, lifting, ambulation and eccentric single leg control. [] UE / cervical: cervical, scapular, scapulothoracic and UE control with self care, reaching, carrying, lifting, house/yardwork, driving, computer work. NMR and Therapeutic Activities:    [x] (35610 or 37575) Provided verbal/tactile cueing for activities related to improving balance, coordination, kinesthetic sense, posture, motor skill, proprioception and motor activation to allow for proper function of   [x] LE: / Lumbar core, proximal hip and LE with self care and ADLs  [] UE / Cervical: cervical, postural, scapular, scapulothoracic and UE control with self care, carrying, lifting, driving, computer work. [] (58425) Gait Re-education- Provided training and instruction to the patient for proper LE, core and proximal hip recruitment and positioning and eccentric body weight control with ambulation re-education including up and down stairs     Home Management Training / Self Care:  [] (47324) Provided self-care/home management training related to activities of daily living and compensatory training, and/or use of adaptive equipment for improvement with: ADLs and compensatory training, meal preparation, safety procedures and instruction in use of adaptive equipment, including bathing, grooming, dressing, personal hygiene, basic household cleaning and chores.      Home Exercise Program:    [] (48272) Reviewed/Progressed HEP activities related to strengthening, flexibility, endurance, ROM of   [] LE / Lumbar: core, proximal hip and LE for functional self-care, mobility, lifting and ambulation/stair navigation   [] UE / Cervical: cervical, postural, scapular, scapulothoracic and UE control with self care, reaching, carrying, lifting, house/yardwork, driving, computer work  [] (47606)Reviewed/Progressed HEP activities related to improving balance, coordination, kinesthetic sense, posture, motor skill, proprioception of   [] LE: core, proximal hip and LE for self care, mobility, lifting, and ambulation/stair navigation    [] UE / Cervical: cervical, postural,  scapular, scapulothoracic and UE control with self care, reaching, carrying, lifting, house/yardwork, driving, computer work    Manual Treatments:  PROM / STM / Oscillations-Mobs:  G-I, II, III, IV (PA's, Inf., Post.)  [] (63457) Provided manual therapy to mobilize LE, proximal hip and/or LS spine soft tissue/joints for the purpose of modulating pain, promoting relaxation,  increasing ROM, reducing/eliminating soft tissue swelling/inflammation/restriction, improving soft tissue extensibility and allowing for proper ROM for normal function with [] LE / lumbar: self care, mobility, lifting and ambulation. [] UE / Cervical: self care, reaching, carrying, lifting, house/yardwork, driving, computer work. Modalities:  [] (21171) Vasopneumatic compression: Utilized vasopneumatic compression to decrease edema / swelling for the purpose of improving mobility and quad tone / recruitment which will allow for increased overall function including but not limited to self-care, transfers, ambulation, and ascending / descending stairs. Modalities:  12/8  E-stim IFC  to left shoulder with CP in semi-reclined position in supine with roll under knees x 15'    11/20 IFC to low back with HP and grey bolster x 15 mins     Charges:  Timed Code Treatment Minutes: 90   Total Treatment Minutes: 90     [] EVAL - LOW (68236)   [] EVAL - MOD (26491)  [] EVAL - HIGH (56289)  [] RE-EVAL (15702)  [x] EF(51607) x 4              [] Ionto  [x] NMR (08819) x 1       [] Vaso  [x] Manual (61516) x 1      [] Ultrasound  [] TA x        [] Mech Traction (90521)  [] Aquatic Therapy x      [] ES (un) (09110):   [] Home Management Training x  [] ES(attended) (76329)   [] Dry Needling 1-2 muscles (51674):  [] Dry Needling 3+ muscles (233020)  [] Group:      [] Other:     GOALS: Patient stated goal: strengthen legs to do stuff, such as golfing if able   [x]? Progressing: []? Met: []? Not Met: []? Adjusted     Therapist goals for Patient:   Short Term Goals: To be achieved in: 2 weeks  1. Independent in HEP and progression per patient tolerance, in order to prevent re-injury. [x]? Progressing: []? Met: []? Not Met: []? Adjusted  2. Patient will have a decrease in pain to facilitate improvement in movement, function, and ADLs as indicated by Functional Deficits. [x]? Progressing: []? Met: []? Not Met: []? Adjusted     Long Term Goals: To be achieved in: 6 weeks  1. Disability index score of 30% or less for the LEFS to assist with reaching prior level of function. [x]? Progressing: []? Met: []? Not Met: []? Adjusted  3. Patient will demonstrate an increase in Strength to at least 4/5 in RLE as well as good proximal hip strength and control to allow for proper functional mobility as indicated by patients Functional Deficits. [x]? Progressing: []? Met: []? Not Met: []? Adjusted  4. Patient will return to functional activities including ambulating 3-4 blocks or around [facility] by patient's home without increased symptoms or restriction to resume PLOF. [x]? Progressing: []? Met: []? Not Met: []? Adjusted  5. Pt will reduce risk for falls by improving semi-tandem balance to 30 secs/ NEW GOAL:   Pt will improve tandem balance to 30 secs and/or perform 30 sec sit to stand test with good mechanics to help reduce risk for falls   []? Progressing: [x]? Met: []? Not Met: [x]? Adjusted         Overall Progression Towards Functional goals/ Treatment Progress Update:  [x] Patient is progressing as expected towards functional goals listed. [] Progression is slowed due to complexities/Impairments listed. [] Progression has been slowed due to co-morbidities. [] Plan just implemented, too soon to assess goals progression <30days   [] Goals require adjustment due to lack of progress  [] Patient is not progressing as expected and requires additional follow up with physician  [] Other    Persisting Functional Limitations/Impairments:  []Sleeping []Sitting               [x]Standing [x]Transfers        [x]Walking []Kneeling               [x]Stairs [x]Squatting / bending   []ADLs [x]Reaching  [x]Lifting  [x]Housework  []Driving []Job related tasks  []Sports/Recreation []Other:        ASSESSMENT: Pt continues to be fearful of falling. He is also limited in ability to complete transfers due to L shoulder pain. He was unable to perform the 30 sec STS this date. Will continue with strengthening and balance as tolerated.                                    Treatment/Activity Tolerance: [x] Patient able to complete tx  [] Patient limited by fatigue  [] Patient limited by pain  [] Patient limited by other medical complications  [] Other:     Prognosis: [x] Good [] Fair  [] Poor    Patient Requires Follow-up: [x] Yes  [] No    Plan for next treatment session:  Balance, LE strengthening. Will progress/update with shoulder exercises once eval is completed     PLAN: See eval. PT 2x / week for 6 weeks. Cont 2x/week x 4-6 weeks    [x] Continue per plan of care [] Alter current plan (see comments)  [] Plan of care initiated [] Hold pending MD visit [] Discharge    Electronically signed by: Mini Mazariegos, PT DPT      Note: If patient does not return for scheduled/ recommended follow up visits, this note will serve as a discharge from care along with most recent update on progress.

## 2021-01-06 ENCOUNTER — HOSPITAL ENCOUNTER (OUTPATIENT)
Dept: PHYSICAL THERAPY | Age: 81
Setting detail: THERAPIES SERIES
Discharge: HOME | End: 2021-01-06
Payer: MEDICARE

## 2021-01-06 PROCEDURE — 97140 MANUAL THERAPY 1/> REGIONS: CPT

## 2021-01-06 PROCEDURE — 97112 NEUROMUSCULAR REEDUCATION: CPT

## 2021-01-06 PROCEDURE — 97110 THERAPEUTIC EXERCISES: CPT

## 2021-01-06 PROCEDURE — 97530 THERAPEUTIC ACTIVITIES: CPT

## 2021-01-06 NOTE — FLOWSHEET NOTE
[facility]  Outpatient Physical Therapy  Phone: [phone]   Fax: [phone]    Physical Therapy Daily Treatment Note/Progress Report   Date:  2021    Patient Name:  Chayo Reyes    :  1940  MRN: [id number]  Medical/Treatment Diagnosis Information:  · Diagnosis: Neuropathy G62.9  · Treatment Diagnosis: Impaired balance, decreased ankle/hip/knee strength and stability, increased fall risk  Insurance/Certification information:  PT Insurance Information: Medicare    Physician Information:  Referring Practitioner: Frida Crowley  Plan of care signed (Y/N): [x]  Yes []  No     Date of Patient follow up with Physician:      Progress Report: [x]  Yes  []  No     Date Range for reporting period:  Beginnin20  Endin20     Progress report due (10 Rx/or 30 days whichever is less): visit #20 or 2/    Recertification due (POC duration/ or 90 days whichever is less):  POC duration       Visit # Insurance Allowable Auth required? Date Range    +   10/8-12   +   [place] []  Yes  [x]  No       Latex Allergy:  [x]NO      []YES  Preferred Language for Healthcare:   [x]English       []other:    Functional Scale:        Date assessed:  LEFS: raw score = 28; dysfunction 60-79%   10/23     Pain level:  3/10 L shoulder pain with medication, 0/10 R quad;   0/10 in low back     SUBJECTIVE: Says he feels good, the arms has it's spells and gives him a fit, usually gets better during the day. No improvement really in terms of being able to lift up the right leg. OBJECTIVE: , ,   Pt required physical assist to lift RLE onto bike/nustep today. Multiple attempts  And significant compensatory patterns for sit to/from stand. :   Pt required mod - max  A to perform sit to stand today mostly due to L shoulder and R quad pain, and overall LE weakness. No orders in system yet for therapy. Advised pt. Dr. Janna Bridges wanted RTC in 6 weeks.              * = painful         RESTRICTIONS/PRECAUTIONS:   FALL RISK     Exercises/Interventions:     Therapeutic Exercises (59825) Resistance / level Sets/sec Reps Notes   Scifit/Nustep  X 6 mins , Level 5       HS Stretch  30\"  X 2 B    IB, HR/TR    Mini squat at counter  1     X 10  30\" x 2    12/1  placed chair behind pt,  suggested he do this when doing squat at home     Mat:  SL B  Bridging with Ball Squeeze  Hip flexion/Heel slides   Clamshell - hooklying   Clamshell - sidelying   Knee exten - pillow b/t knees  Quad sets    Hip Abduction supine with slide board  Bridging with SB under ankles   Sidelying Hip Abduction       Lime  Blue   2        2   10                10 B           12/17  Done with russian     T.G. Squats       Step-Ups Fwd 4\"  X 20 B    Step-Downs  10/19 Added, with difficulty, use of // bars    Supine SLR Flexion            SLR Flexion Sidelying  0# R/L   L 2             X 10                  12/14, 12/21, 12/30, 1/4 AAROM for RLE  12/23  Less assistance needed     SAQ  10/1 Added  12/14 AAROM needed for RLE    Clamshells hooklying  9/22 Added   Bridging     LAQ 9/22 Added, partial range for RLE   12/8  AAROM for RLE    CKC TKE  9/25 Added          Cable Column        Cable Walkouts 4-way  9/28 Added                  [employer]:    Leg Press  HS Curls        TRX Squats    Added 11/10       Attempted, could not lift weight, switched back to LAQ with 1/2#                 LTR  SKTC  Seated trunk flexion edge of mat      1            Time spent assessing MMT, gait and transfers for PN                      Therapeutic Activities (61262)              Lateral Band Walk 10/9 Added    Partial        Sit to stands from edge of mat No UE use 12/30 cues for eccentric control  10/5 Attempted, unable, try at future visits   10/16 with PT assist          Mini Squats to chair in // bars 1X 1012/30 TC's for weight shift 12/21- CGA/MIN A   Fwd lunges onto 6\" step with RW support       Alt toe taps  4\" Gait training on grey mats in // bars    10/23 Added, unsupported    Ramps up/down with SPC by basketball court     10/26 Added  11/3  Descended forward, ascended backward  With [employer]   Gait training with [employer]  12/30 CGA-SBA       10/30 tried, pt apprehensive        Gravity Minimized due to wekaness in anti- gravity position          Neuromuscular Re-ed (61742)       [facility] e[facility]stim with quad sets, duty cycle 50%, 10/20 cycle time, 60 pps burst frequency---44-59 mA CC   X 10 mins    Airex Tandem Balance   9/22 Added   SLS Airex   9/25 Added, occ UE support    Wobbleboard carpet       BOSU Lunges Fwd  9/22 Added   10/5  Mild collapse RLE today   Hip Hikes, lateral (foot flat) 10/5 Added    Alt Toe taps standing on Airex  9/25 Added, unsupported    Shuttle Step-Up to [employer]  Shuttle Hip Abduction  Shuttle Marching  Added 10/1          Bi[employer]x               1/2 wedge DF, PF static standing   10/19 Added    Hand to opp knee grey mat with ambulation      10/23 Added    Jessi ambulation In // bars Added 12/30 trial with SPC          Manual Intervention (86880)       STM lumbar paraspinals, hamstring/pifiromis stretch       Theraroller/STM to R rectus fem  x8 mins                                         Shoulder        Supine wand Chest Press   X 10     Supine Wand Shoulder Flexion   3 X 10                Pt. Education:     12/1  Discussed with pt Importance of safety,  Pt does have raised toilet seat,  suggested he use to minimize stress on L shoulder as well as make sit to stand easier/safer.   Also discussed getting electric recliner to assist with sit to stand    -patient educated on diagnosis, prognosis and expectations for rehab  -all patient questions were answered    Home Exercise Program:  Pt has handout including ankle pumps, seated Hip flexion, SLR, mini squats he is currently doing given by home health PT  Discussed and demonstrated this date      Therapeutic Exercise and NMR EXR [x] (83650) Provided verbal/tactile cueing for activities related to strengthening, flexibility, endurance, ROM for improvements in  [x] LE / Lumbar: LE, proximal hip, and core control with self care, mobility, lifting, ambulation. [] UE / Cervical: cervical, postural, scapular, scapulothoracic and UE control with self care, reaching, carrying, lifting, house/yardwork, driving, computer work. [x] (89780) Provided verbal/tactile cueing for activities related to improving balance, coordination, kinesthetic sense, posture, motor skill, proprioception to assist with   [x] LE / lumbar: LE, proximal hip, and core control in self care, mobility, lifting, ambulation and eccentric single leg control. [] UE / cervical: cervical, scapular, scapulothoracic and UE control with self care, reaching, carrying, lifting, house/yardwork, driving, computer work.   [] (28004) Therapist is in constant attendance of 2 or more patients providing skilled therapy interventions, but not providing any significant amount of measurable one-on-one time to either patient, for improvements in  [] LE / lumbar: LE, proximal hip, and core control in self care, mobility, lifting, ambulation and eccentric single leg control. [] UE / cervical: cervical, scapular, scapulothoracic and UE control with self care, reaching, carrying, lifting, house/yardwork, driving, computer work. NMR and Therapeutic Activities:    [x] (88299 or 12602) Provided verbal/tactile cueing for activities related to improving balance, coordination, kinesthetic sense, posture, motor skill, proprioception and motor activation to allow for proper function of   [x] LE: / Lumbar core, proximal hip and LE with self care and ADLs  [] UE / Cervical: cervical, postural, scapular, scapulothoracic and UE control with self care, carrying, lifting, driving, computer work. [] (96932) Gait Re-education- Provided training and instruction to the patient for proper LE, core and proximal hip recruitment and positioning and eccentric body weight control with ambulation re-education including up and down stairs     Home Management Training / Self Care:  [] (54361) Provided self-care/home management training related to activities of daily living and compensatory training, and/or use of adaptive equipment for improvement with: ADLs and compensatory training, meal preparation, safety procedures and instruction in use of adaptive equipment, including bathing, grooming, dressing, personal hygiene, basic household cleaning and chores.      Home Exercise Program:    [] (21150) Reviewed/Progressed HEP activities related to strengthening, flexibility, endurance, ROM of   [] LE / Lumbar: core, proximal hip and LE for functional self-care, mobility, lifting and ambulation/stair navigation   [] UE / Cervical: cervical, postural, scapular, scapulothoracic and UE control with self care, reaching, carrying, lifting, house/yardwork, driving, computer work  [] (08618)Reviewed/Progressed HEP activities related to improving balance, coordination, kinesthetic sense, posture, motor skill, proprioception of   [] LE: core, proximal hip and LE for self care, mobility, lifting, and ambulation/stair navigation    [] UE / Cervical: cervical, postural,  scapular, scapulothoracic and UE control with self care, reaching, carrying, lifting, house/yardwork, driving, computer work    Manual Treatments:  PROM / STM / Oscillations-Mobs:  G-I, II, III, IV (PA's, Inf., Post.)  [] (57027) Provided manual therapy to mobilize LE, proximal hip and/or LS spine soft tissue/joints for the purpose of modulating pain, promoting relaxation,  increasing ROM, reducing/eliminating soft tissue swelling/inflammation/restriction, improving soft tissue extensibility and allowing for proper ROM for normal function with [] LE / lumbar: self care, mobility, lifting and ambulation. [] UE / Cervical: self care, reaching, carrying, lifting, house/yardwork, driving, computer work. Modalities:  [] (44763) Vasopneumatic compression: Utilized vasopneumatic compression to decrease edema / swelling for the purpose of improving mobility and quad tone / recruitment which will allow for increased overall function including but not limited to self-care, transfers, ambulation, and ascending / descending stairs. Modalities:  12/8  E-stim IFC  to left shoulder with CP in semi-reclined position in supine with roll under knees x 15'    11/20 IFC to low back with HP and grey bolster x 15 mins     Charges:  Timed Code Treatment Minutes: 62   Total Treatment Minutes: 62     [] EVAL - LOW (65441)   [] EVAL - MOD (65847)  [] EVAL - HIGH (75496)  [] RE-EVAL (38668)  [x] CE(09475) x 1              Ionto  [x] NMR (21975) x 1       [] Vaso  [x] Manual (21988) x 1      [] Ultrasound  [x] TA x 1        [] Mech Traction (41639)  [] Aquatic Therapy x      [] ES (un) (20051):   [] Home Management Training x  [] ES(attended) (69461)   [] Dry Needling 1-2 muscles (42261):  [] Dry Needling 3+ muscles (037956)  [] Group:      [] Other:     GOALS: Patient stated goal: strengthen legs to do stuff, such as golfing if able   [x]? Progressing: []? Met: []? Not Met: []? Adjusted     Therapist goals for Patient:   Short Term Goals: To be achieved in: 2 weeks  1. Independent in HEP and progression per patient tolerance, in order to prevent re-injury. [x]? Progressing: []? Met: []? Not Met: []? Adjusted  2. Patient will have a decrease in pain to facilitate improvement in movement, function, and ADLs as indicated by Functional Deficits. [x]? Progressing: []? Met: []? Not Met: []? Adjusted     Long Term Goals: To be achieved in: 6 weeks  1. Disability index score of 30% or less for the LEFS to assist with reaching prior level of function. [x]? Progressing: []? Met: []? Not Met: []? Adjusted  3. Patient will demonstrate an increase in Strength to at least 4/5 in RLE as well as good proximal hip strength and control to allow for proper functional mobility as indicated by patients Functional Deficits. [x]? Progressing: []? Met: []? Not Met: []? Adjusted  4. Patient will return to functional activities including ambulating 3-4 blocks or around [facility] by patient's home without increased symptoms or restriction to resume PLOF. [x]? Progressing: []? Met: []? Not Met: []? Adjusted  5. Pt will reduce risk for falls by improving semi-tandem balance to 30 secs/ NEW GOAL:   Pt will improve tandem balance to 30 secs and/or perform 30 sec sit to stand test with good mechanics to help reduce risk for falls   []? Progressing: []? Met: []? Not Met: [x]? Adjusted         Overall Progression Towards Functional goals/ Treatment Progress Update:  [x] Patient is progressing as expected towards functional goals listed. [] Progression is slowed due to complexities/Impairments listed. [] Progression has been slowed due to co-morbidities.   [] Plan just implemented, too soon to assess goals progression <30days   [] Goals require adjustment due to lack of progress  [] Patient is not progressing as expected and requires additional follow up with physician  [] Other    Persisting Functional Limitations/Impairments:  []Sleeping []Sitting               [x]Standing [x]Transfers        [x]Walking []Kneeling               [x]Stairs [x]Squatting / bending   []ADLs [x]Reaching  [x]Lifting  [x]Housework  []Driving []Job related tasks  []Sports/Recreation []Other:

## 2021-01-07 ENCOUNTER — OFFICE VISIT (OUTPATIENT)
Dept: ORTHOPEDIC SURGERY | Age: 81
End: 2021-01-07
Payer: MEDICARE

## 2021-01-07 VITALS — TEMPERATURE: 98.1 F

## 2021-01-07 DIAGNOSIS — M75.122 COMPLETE TEAR OF LEFT ROTATOR CUFF, UNSPECIFIED WHETHER TRAUMATIC: Primary | ICD-10-CM

## 2021-01-07 PROCEDURE — 99213 OFFICE O/P EST LOW 20 MIN: CPT | Performed by: PHYSICIAN ASSISTANT

## 2021-01-07 PROCEDURE — G8427 DOCREV CUR MEDS BY ELIG CLIN: HCPCS | Performed by: PHYSICIAN ASSISTANT

## 2021-01-07 PROCEDURE — 4040F PNEUMOC VAC/ADMIN/RCVD: CPT | Performed by: PHYSICIAN ASSISTANT

## 2021-01-07 PROCEDURE — G8484 FLU IMMUNIZE NO ADMIN: HCPCS | Performed by: PHYSICIAN ASSISTANT

## 2021-01-07 PROCEDURE — G8417 CALC BMI ABV UP PARAM F/U: HCPCS | Performed by: PHYSICIAN ASSISTANT

## 2021-01-07 PROCEDURE — 20610 DRAIN/INJ JOINT/BURSA W/O US: CPT | Performed by: PHYSICIAN ASSISTANT

## 2021-01-07 PROCEDURE — 1123F ACP DISCUSS/DSCN MKR DOCD: CPT | Performed by: PHYSICIAN ASSISTANT

## 2021-01-07 PROCEDURE — 1036F TOBACCO NON-USER: CPT | Performed by: PHYSICIAN ASSISTANT

## 2021-01-08 NOTE — PROGRESS NOTES
Subjective:      Patient ID: Dona Alcantara is a 78 y.o. male. Chief Complaint   Patient presents with    Results     MRI left shoulder        HPI:   He is here for follow up on his left shoulder, MRI results. He is continues to have left shoulder pain and difficulty raising his left arm. He is in physical therapy for quad strengthening status post right partial quadricep muscle tear. He states he has difficulty getting up and down from the chair due to the combination of his left shoulder pain and weakness as well as his right quadriceps weakness. Pain Scale left shoulder 6/10 VAS. Location of pain deltoid region of the left shoulder. Pain is worse with movement of the left arm. Pain improves with immobilization. Previous treatments have included injection x1 with mild temporary relief. He is in therapy. Review Of Systems:   Negative for fever or chills. Negative for numbness or tingling left upper extremity.      Past Medical History:   Diagnosis Date    Anxiety     Arthritis     Diabetes mellitus ([facility]     High blood pressure     [place] (hard of hearing)     Hyperlipidemia     Irregular heart beat     Wears glasses     Wears hearing aid in both ears     Wears partial dentures        Family History   Problem Relation Age of Onset    No Known Problems Mother     No Known Problems Father     Heart Disease Brother     Heart Disease Brother        Past Surgical History:   Procedure Laterality Date    COLONOSCOPY      CYST REMOVAL Left 5/21/15    FOOT SURGERY Left     cyst removed from foot    JOINT REPLACEMENT Right     shoulder    LUMBAR SPINE SURGERY Bilateral 6/12/2019    BILATERAL L4 TRANSFORAMINAL EPIDURAL STEROID INJECTION WITH FLUOROSCOPY performed by Barbara Fountain MD at [address] [place] [address] Bilateral 6/26/2019    BILATERAL L4 TRANSFORAMINAL EPIDURAL STEROID INJECTION WITH FLUOROSCOPY performed by Barbara Fountain MD at [facility] No current facility-administered medications for this visit. Objective:     He is alert, oriented x 3, pleasant, well nourished, developed and in no   acute distress. Temp 98.1 °F (36.7 °C) (Temporal)      Examination of the left shoulder shows: There is no deformity. There is no erythema. There is no  soft tissue swelling. Deltoid region is  tender to palpation. AC Joint is not tender to palpation. Clavicle is not tender to palpation. Bicipital Groove is not  tender to palpation. Pectoralis  is not tender to palpation. Scapula/ trapezius is not tender to palpation. There is marked weakness with supraspinatus testing. There is moderate pain with supraspinatus testing. Yergason Test negative. Drop Arm Test negative. Apprehension Test negative. Cross Arm Test negative. Shoulder AROM-      FF 60  ABD 60 IR to ASIS ER 20     MRI: Obtained from [address] [place] [address] or an outside facility. MRI left shoulder dated 12/19 [place], [place]  Demonstrates full-thickness complete tears of the supraspinatus and infraspinatus tendons, labral tear    X Rays: not performed in the office today:       Diagnosis:       ICD-10-CM    1. Complete tear of left rotator cuff, unspecified whether traumatic  M75.122 [place] ARTHROCENTESIS ASPIR&/INJ MAJOR JT/BURSA W/O US     [place] TRIAMCINOLONE ACETONIDE INJ        Assessment and Plan:       Assessment:  He has a very large full-thickness retracted tear of the supraspinatus and infraspinatus tendons of the left rotator cuff. Surgical correction would require a reverse shoulder arthroplasty given his age. He states with his quadriceps weakness on the right side he thinks it would be difficult for him to rehab his left shoulder. At this time he is not interested in left shoulder surgery. 25 minutes was the total time spent on today's visit  including reviewing test results, obtaining or reviewing history, physical exam, time spent on documentation or ordering prescriptions, tests and procedures after the visit. Plan:  Medications- OTC NSAIDS discussed. He  was advised that NSAID-type medications have two very important potential side effects: gastrointestinal irritation including hemorrhage and renal injuries. He was asked to take the medication with food and to stop if he experiences any GI upset. I asked him to call for vomiting, abdominal pain or black/bloody stools. He should have renal function testing per his medical provider periodically. The patient expresses understanding of these issues and questions were answered. PT-currently in therapy. Further Imaging-none indicated at this time. Procedures-repeat left shoulder subacromial injection for pain control. Cortisone  Injection  PROCEDURE NOTE:  Pre op Diagnosis: Shoulder pain  Post op Diagnosis: Same  With Warner Albert permission, his left shoulder was prepped  in standard sterile fashion with  Alcohol and 2 cc of 0.25% Marcaine and 1 cc of Kenalog 40 mg was injected into the left lateral subacromial space  without difficulty. He tolerated this well without difficulty. A band-aid was applied. He was advised to ice the shoulder for 15-20 minutes to relieve any injection site related pain. Follow up- 6-8 weeks. Call or return to clinic if these symptoms worsen or fail to improve as anticipated.

## 2021-01-11 ENCOUNTER — HOSPITAL ENCOUNTER (OUTPATIENT)
Dept: PHYSICAL THERAPY | Age: 81
Setting detail: THERAPIES SERIES
Discharge: HOME | End: 2021-01-11
Payer: MEDICARE

## 2021-01-11 NOTE — PROGRESS NOTES
Physical Therapy  Cancellation/No-show Note  Patient Name:  Hannah Boyer  :  1940   Date:  2021  Cancelled visits to date: 3   No-shows to date: 0    Patient status for today's appointment patient:  [x]  Cancelled , ,   []  Rescheduled appointment  []  No-show     Reason given by patient:  []  Patient ill  []  Conflicting appointment  []  No transportation    []  Conflict with work  []  No reason given  [x]  Other:     Comments: no reason given, left message on machine      Phone call information:   []  Phone call made today to patient at _ time at number provided:      []  Patient answered, conversation as follows:    []  Patient did not answer, message left as follows:  [x]  Phone call not made today    Electronically signed by:  Fransisco Garcia, PT  PTA

## 2021-01-11 NOTE — PROGRESS NOTES
Physical Therapy  Cancellation/No-show Note  Patient Name:  Marcela Willams  :  1940   Date:  2021  Cancelled visits to date: 3   No-shows to date: 0    Patient status for today's appointment patient:  [x]  Cancelled , ,   []  Rescheduled appointment  []  No-show     Reason given by patient:  [x]  Patient ill  []  Conflicting appointment  []  No transportation    []  Conflict with work  []  No reason given  []  Other:     Comments: no reason given, left message on machine      Phone call information:   []  Phone call made today to patient at _ time at number provided:      []  Patient answered, conversation as follows:    []  Patient did not answer, message left as follows:  [x]  Phone call not made today    Electronically signed by:  Mercedes Mcguire, PT

## 2021-01-13 ENCOUNTER — HOSPITAL ENCOUNTER (OUTPATIENT)
Dept: PHYSICAL THERAPY | Age: 81
Setting detail: THERAPIES SERIES
Discharge: HOME | End: 2021-01-13
Payer: MEDICARE

## 2021-01-13 NOTE — PROGRESS NOTES
Physical Therapy  Cancellation/No-show Note  Patient Name:  Remi Montes De Oca  :  1940   Date:  2021  Cancelled visits to date: 4  No-shows to date: 0    Patient status for today's appointment patient:  [x]  Cancelled , ,   []  Rescheduled appointment  []  No-show     Reason given by patient:  []  Patient ill  []  Conflicting appointment  []  No transportation    []  Conflict with work  []  No reason given  [x]  Other:     Comments: stated he is still hurting too much     Phone call information:   []  Phone call made today to patient at _ time at number provided:      []  Patient answered, conversation as follows:    []  Patient did not answer, message left as follows:  [x]  Phone call not made today    Electronically signed by:  Karey Platt, PT

## 2021-02-02 ENCOUNTER — OFFICE VISIT (OUTPATIENT)
Dept: ORTHOPEDIC SURGERY | Age: 81
End: 2021-02-02
Payer: MEDICARE

## 2021-02-02 VITALS — TEMPERATURE: 97.5 F

## 2021-02-02 DIAGNOSIS — S76.111A: Primary | ICD-10-CM

## 2021-02-02 DIAGNOSIS — M75.122 COMPLETE TEAR OF LEFT ROTATOR CUFF, UNSPECIFIED WHETHER TRAUMATIC: ICD-10-CM

## 2021-02-02 PROCEDURE — 1123F ACP DISCUSS/DSCN MKR DOCD: CPT | Performed by: PHYSICIAN ASSISTANT

## 2021-02-02 PROCEDURE — 1036F TOBACCO NON-USER: CPT | Performed by: PHYSICIAN ASSISTANT

## 2021-02-02 PROCEDURE — G8417 CALC BMI ABV UP PARAM F/U: HCPCS | Performed by: PHYSICIAN ASSISTANT

## 2021-02-02 PROCEDURE — 4040F PNEUMOC VAC/ADMIN/RCVD: CPT | Performed by: PHYSICIAN ASSISTANT

## 2021-02-02 PROCEDURE — G8484 FLU IMMUNIZE NO ADMIN: HCPCS | Performed by: PHYSICIAN ASSISTANT

## 2021-02-02 PROCEDURE — 99213 OFFICE O/P EST LOW 20 MIN: CPT | Performed by: PHYSICIAN ASSISTANT

## 2021-02-02 PROCEDURE — G8427 DOCREV CUR MEDS BY ELIG CLIN: HCPCS | Performed by: PHYSICIAN ASSISTANT

## 2021-02-02 NOTE — PROGRESS NOTES
Subjective:      Patient ID: Marina Holcomb is a 78 y.o. male. Chief Complaint   Patient presents with    Shoulder Pain     left shoulder pain        HPI:   He is here for follow up on his left shoulder which is status post subacromial injection 1/7/2021 for complete tear of the left rotator cuff. He reports significant improvement status post injection. He has very little discomfort in the left shoulder. He still has weakness and difficulty raising his left arm. He continues to use a walker to assist with ambulation due to his low back pain. He also has a history of a prior right quadriceps muscle tear which occurred last fall. He is currently in physical therapy for the right quadriceps muscle tear without improvement. Review Of Systems:   A 14 point review of systems and history form completed by the patient has been reviewed. This form is scanned in the media tab of the patient's chart under 8/18/2020 date.      Past Medical History:   Diagnosis Date    Anxiety     Arthritis     Diabetes mellitus ([address]     High blood pressure     [place] (hard of hearing)     Hyperlipidemia     Irregular heart beat     Wears glasses     Wears hearing aid in both ears     Wears partial dentures        Family History   Problem Relation Age of Onset    No Known Problems Mother     No Known Problems Father     Heart Disease Brother     Heart Disease Brother        Past Surgical History:   Procedure Laterality Date    COLONOSCOPY      CYST REMOVAL Left 5/21/15    FOOT SURGERY Left     cyst removed from foot    JOINT REPLACEMENT Right     shoulder    LUMBAR SPINE SURGERY Bilateral 6/12/2019    BILATERAL L4 TRANSFORAMINAL EPIDURAL STEROID INJECTION WITH FLUOROSCOPY performed by Emanuel Laird MD at [address] [place] [address] Bilateral 6/26/2019    BILATERAL L4 TRANSFORAMINAL EPIDURAL STEROID INJECTION WITH FLUOROSCOPY performed by Emanuel Laird MD at [facility] No current facility-administered medications for this visit. Objective:     He is alert, oriented x 3, pleasant, well nourished, developed and in no   acute distress. Temp 97.5 °F (36.4 °C) (Temporal)      Examination of the left shoulder shows: There is no deformity. There is no erythema. There is no  soft tissue swelling. Deltoid region is  tender to palpation. AC Joint is not tender to palpation. Clavicle is not tender to palpation. Bicipital Groove is not  tender to palpation. Pectoralis  is not tender to palpation. Scapula/ trapezius is not tender to palpation. There is marked weakness with supraspinatus testing. There is mild pain with supraspinatus testing. Yergason Test negative. Drop Arm Test negative. Apprehension Test negative. Cross Arm Test negative. Shoulder AROM-      FF 60  ABD 60 IR to ASIS ER 20    Examination right thigh shows atrophy of the quadriceps muscle. He has about a 20 degree active extensor lag. He has further right knee flexion to about 110 degrees. X Rays: not performed in the office today:       Diagnosis:       ICD-10-CM    1. Rupture of quadriceps muscle, right, initial encounter  S76.111A MRI FEMUR RIGHT WO CONTRAST   2. Complete tear of left rotator cuff, unspecified whether traumatic  M75.122         Assessment and Plan:       Assessment:  MRI left shoulder demonstrated a complete rotator cuff tear. Doing well status post subacromial injection. Discussed surgery as a future option if pain or disability returns. However, cannot get off rolling walker due to quadriceps weakness on the right. He does have a history of a fall with quadriceps tear late last fall. Also has an MRI showing significant spinal stenosis. 20 minutes was the total time spent on today's visit  including reviewing test results, obtaining or reviewing history, physical exam, counseling and educating, time spent on documentation in health record, ordering prescriptions, tests or procedures after the visit. Plan:  Medications- none    PT- none    Further Imaging- MRI right thigh to evaluate for quadriceps muscle/ tendon disruption. Procedures- none      Follow up- after mri. Call or return to clinic if these symptoms worsen or fail to improve as anticipated.

## 2021-02-09 ENCOUNTER — HOSPITAL ENCOUNTER (OUTPATIENT)
Dept: MRI IMAGING | Age: 81
Discharge: HOME | End: 2021-02-09
Payer: MEDICARE

## 2021-02-09 DIAGNOSIS — S76.111A: ICD-10-CM

## 2021-02-09 PROCEDURE — 73718 MRI LOWER EXTREMITY W/O DYE: CPT

## 2021-02-17 ENCOUNTER — OFFICE VISIT (OUTPATIENT)
Dept: ORTHOPEDIC SURGERY | Age: 81
End: 2021-02-17
Payer: MEDICARE

## 2021-02-17 VITALS
TEMPERATURE: 97.7 F | HEART RATE: 69 BPM | HEIGHT: 70 IN | WEIGHT: 182 LBS | SYSTOLIC BLOOD PRESSURE: 132 MMHG | DIASTOLIC BLOOD PRESSURE: 66 MMHG | BODY MASS INDEX: 26.05 KG/M2

## 2021-02-17 DIAGNOSIS — S76.811A RUPTURE OF RECTUS FEMORIS TENDON, RIGHT, INITIAL ENCOUNTER: Primary | ICD-10-CM

## 2021-02-17 PROCEDURE — G8427 DOCREV CUR MEDS BY ELIG CLIN: HCPCS | Performed by: PHYSICIAN ASSISTANT

## 2021-02-17 PROCEDURE — 1036F TOBACCO NON-USER: CPT | Performed by: PHYSICIAN ASSISTANT

## 2021-02-17 PROCEDURE — 4040F PNEUMOC VAC/ADMIN/RCVD: CPT | Performed by: PHYSICIAN ASSISTANT

## 2021-02-17 PROCEDURE — 99212 OFFICE O/P EST SF 10 MIN: CPT | Performed by: PHYSICIAN ASSISTANT

## 2021-02-17 PROCEDURE — G8484 FLU IMMUNIZE NO ADMIN: HCPCS | Performed by: PHYSICIAN ASSISTANT

## 2021-02-17 PROCEDURE — 1123F ACP DISCUSS/DSCN MKR DOCD: CPT | Performed by: PHYSICIAN ASSISTANT

## 2021-02-17 PROCEDURE — G8417 CALC BMI ABV UP PARAM F/U: HCPCS | Performed by: PHYSICIAN ASSISTANT

## 2021-02-17 NOTE — PROGRESS NOTES
Subjective:      Patient ID: Michelle Rivera is a 78 y.o. male. Chief Complaint   Patient presents with    Results     MRI right thigh        HPI:   He is here for follow up on his right thigh MRI results. He still has difficulty performing a knee extension and straight leg raise on the right side. This was a result of a traumatic event/fall. Review Of Systems:   A 14 point review of systems and history form completed by the patient has been reviewed. This form is scanned in the media tab of the patient's chart under 8/18/2020 date.      Past Medical History:   Diagnosis Date    Anxiety     Arthritis     Diabetes mellitus ([address]     High blood pressure     [place] (hard of hearing)     Hyperlipidemia     Irregular heart beat     Wears glasses     Wears hearing aid in both ears     Wears partial dentures        Family History   Problem Relation Age of Onset    No Known Problems Mother     No Known Problems Father     Heart Disease Brother     Heart Disease Brother        Past Surgical History:   Procedure Laterality Date    COLONOSCOPY      CYST REMOVAL Left 5/21/15    FOOT SURGERY Left     cyst removed from foot    JOINT REPLACEMENT Right     shoulder    LUMBAR SPINE SURGERY Bilateral 6/12/2019    BILATERAL L4 TRANSFORAMINAL EPIDURAL STEROID INJECTION WITH FLUOROSCOPY performed by Zelalem Mccabe MD at [address] [place] [address] Bilateral 6/26/2019    BILATERAL L4 TRANSFORAMINAL EPIDURAL STEROID INJECTION WITH FLUOROSCOPY performed by Zelalem Mccabe MD at [address] [place] [address] N/A 10/14/2019    MICROLUMBAR LAMINECTOMY L4-5, 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, performed by Genia Browne MD at [address] [place] [address]      tumor removed from neck    SHOULDER ARTHROPLASTY Right 2/17/15    SHOULDER SURGERY Bilateral     WRIST SURGERY         Social History     Occupational History    Occupation: Retired   Tobacco Use  Smoking status: Former Smoker     Quit date: 1975     Years since quittin.7    Smokeless tobacco: Never Used   Substance and Sexual Activity    Alcohol use: No    Drug use: No    Sexual activity: Not Currently       Current Outpatient Medications   Medication Sig Dispense Refill    naproxen (NAPROSYN) 500 MG tablet Take 1 tablet by mouth 2 times daily (with meals) 60 tablet 0    rosuvastatin (CRESTOR) 20 MG tablet Take 20 mg by mouth every other day      empagliflozin (JARDIANCE) 10 MG tablet Take 10 mg by mouth daily      naproxen (NAPROSYN) 500 MG tablet Take 1 tablet by mouth 2 times daily (with meals) for 14 days 28 tablet 0    pioglitazone (ACTOS) 30 MG tablet Take 30 mg by mouth daily      atorvastatin (LIPITOR) 80 MG tablet Take 80 mg by mouth nightly      Multiple Vitamins-Minerals (MULTIVITAMIN ADULT PO) Take 1 tablet by mouth daily      zolpidem (AMBIEN) 10 MG tablet Take by mouth nightly.  ibuprofen (ADVIL;MOTRIN) 200 MG tablet Take 200 mg by mouth every 6 hours as needed for Pain      metFORMIN (GLUCOPHAGE) 1000 MG tablet Take 1,000 mg by mouth 2 times daily (with meals)      valsartan-hydrochlorothiazide (DIOVAN-HCT) 160-12.5 MG per tablet Take 1 tablet by mouth daily.  glipiZIDE (GLUCOTROL) 5 MG tablet Take 5 mg by mouth.  sertraline (ZOLOFT) 25 MG tablet Take 25 mg by mouth nightly.  Omega-3 Fatty Acids (FISH OIL) 1000 MG CPDR   Take 1,000 mg by mouth 2 times daily       aspirin 81 MG tablet Take 81 mg by mouth daily. No current facility-administered medications for this visit. Objective:     He is alert, oriented x 3, pleasant, well nourished, developed and in no   acute distress. /66   Pulse 69   Temp 97.7 °F (36.5 °C) (Temporal)   Ht 5' 10\" (1.778 m)   Wt 182 lb (82.6 kg)   BMI 26.11 kg/m²      Examination of the right thigh and right knee shows continued atrophy of the thigh muscles. He has about a 20 degree active extensor lag. He has further right knee flexion to about 110 degrees. He has tenderness palpation over the medial compartment of the right knee. X Rays: not performed in the office today:   MRI: Obtained from [facility].  MRI right femur without contrast 2/9/2021:  Impression  No distal quadriceps tear. Low-grade injuries of the vastus lateralis, vastus intermedius and vastus medialis. Moderate to high-grade injury to the rectus femoris near the myotendinous junction which appears subacute. 1.8 x 1.4 x 6.9 cm somewhat rounded heterogeneous region centrally in the region of the muscular injury which likely represents a combination of evolving hematoma or retracted torn fibers. Severe fatty atrophy of the vastus medialis, vastus lateralis and vastus intermedius and rectus femoris. Diagnosis:       ICD-10-CM    1. Rupture of rectus femoris tendon, right, initial encounter  L15.665Z         Assessment and Plan:       Assessment:  He is unable to actively extend his knee due to injury of the rectus femoris as well as atrophy of the vastus medialis, vastus lateralis and vastus intermedius. He does have right knee arthritis mild in nature. Most likely causing medial knee pain. 15 minutes was the total time spent on today's visit  including reviewing test results, obtaining or reviewing history, physical exam, counseling and educating, time spent on documentation in health record, ordering prescriptions, tests or procedures after the visit. Plan:  Medications- no new medications. PT- none    Further Imaging- none    Procedures-   Risk and benefits of corticosteroid intra-articular injection was discussed today. All questions were answered to his satisfaction. He verbally consented to proceed with intra-articular injection today.         Cortisone Injection                                                       PROCEDURE NOTE: Pre op Diagnosis:  right knee pain     Post op Diagnosis: Same  With the Rubi Millant permission, his right knee was prepped  in standard sterile fashion with  Alcohol and 2 cc of 0.25% Marcaine and 1 cc of Kenalog 40 mg was injected into the right lateral compartment  without difficulty. The patient tolerated this well without difficulty. A band-aid was applied. The patient was advised to ice the knee for 15-20 minutes to relieve any injection site related pain. Prescription for a drop lock hinged knee brace was provided today. Follow up:   Call or return to clinic if these symptoms worsen or fail to improve as anticipated.

## 2021-03-29 ENCOUNTER — OFFICE VISIT (OUTPATIENT)
Dept: ORTHOPEDIC SURGERY | Age: 81
End: 2021-03-29
Payer: MEDICARE

## 2021-03-29 VITALS
SYSTOLIC BLOOD PRESSURE: 139 MMHG | WEIGHT: 182 LBS | DIASTOLIC BLOOD PRESSURE: 60 MMHG | BODY MASS INDEX: 26.05 KG/M2 | TEMPERATURE: 97.3 F | HEART RATE: 71 BPM | HEIGHT: 70 IN

## 2021-03-29 DIAGNOSIS — S76.111A: ICD-10-CM

## 2021-03-29 DIAGNOSIS — M17.11 ARTHRITIS OF KNEE, RIGHT: ICD-10-CM

## 2021-03-29 DIAGNOSIS — S76.811A RUPTURE OF RECTUS FEMORIS TENDON, RIGHT, INITIAL ENCOUNTER: Primary | ICD-10-CM

## 2021-03-29 PROCEDURE — 4040F PNEUMOC VAC/ADMIN/RCVD: CPT | Performed by: PHYSICIAN ASSISTANT

## 2021-03-29 PROCEDURE — 99213 OFFICE O/P EST LOW 20 MIN: CPT | Performed by: PHYSICIAN ASSISTANT

## 2021-03-29 PROCEDURE — 1123F ACP DISCUSS/DSCN MKR DOCD: CPT | Performed by: PHYSICIAN ASSISTANT

## 2021-03-29 PROCEDURE — 1036F TOBACCO NON-USER: CPT | Performed by: PHYSICIAN ASSISTANT

## 2021-03-29 PROCEDURE — G8484 FLU IMMUNIZE NO ADMIN: HCPCS | Performed by: PHYSICIAN ASSISTANT

## 2021-03-29 PROCEDURE — 20610 DRAIN/INJ JOINT/BURSA W/O US: CPT | Performed by: PHYSICIAN ASSISTANT

## 2021-03-29 PROCEDURE — G8427 DOCREV CUR MEDS BY ELIG CLIN: HCPCS | Performed by: PHYSICIAN ASSISTANT

## 2021-03-29 PROCEDURE — G8417 CALC BMI ABV UP PARAM F/U: HCPCS | Performed by: PHYSICIAN ASSISTANT

## 2021-03-30 PROBLEM — M17.11 ARTHRITIS OF KNEE, RIGHT: Status: ACTIVE | Noted: 2021-03-30

## 2021-03-30 NOTE — PROGRESS NOTES
Subjective:      Patient ID: Kenya Peralta is a 78 y.o. male. Chief Complaint   Patient presents with    Follow-up     Rt knee        HPI:   He is here for follow up on his right lower extremity. He has an MRI positive for rupture of the rectus femoris, quadriceps muscle. He is in a drop lock hinged knee brace as his rupture is not repairable by surgery. He has not gained much of his strength back with therapy. He states the brace tends to put pressure on his medial femoral condyle region and has discomfort in his right knee. He does have a history of mild right knee arthritis. Review Of Systems:   Negative for fever or chills.      Past Medical History:   Diagnosis Date    Anxiety     Arthritis     Diabetes mellitus ([address]     High blood pressure     [place] (hard of hearing)     Hyperlipidemia     Irregular heart beat     Wears glasses     Wears hearing aid in both ears     Wears partial dentures        Family History   Problem Relation Age of Onset    No Known Problems Mother     No Known Problems Father     Heart Disease Brother     Heart Disease Brother        Past Surgical History:   Procedure Laterality Date    COLONOSCOPY      CYST REMOVAL Left 5/21/15    FOOT SURGERY Left     cyst removed from foot    JOINT REPLACEMENT Right     shoulder    LUMBAR SPINE SURGERY Bilateral 6/12/2019    BILATERAL L4 TRANSFORAMINAL EPIDURAL STEROID INJECTION WITH FLUOROSCOPY performed by Dane De Souza MD at [address] [place] [address] Bilateral 6/26/2019    BILATERAL L4 TRANSFORAMINAL EPIDURAL STEROID INJECTION WITH FLUOROSCOPY performed by Dane De Souza MD at [address] [place] [address] N/A 10/14/2019    MICROLUMBAR LAMINECTOMY L4-5, 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, performed by Rhys Childress MD at [place] [address]      tumor removed from neck    SHOULDER ARTHROPLASTY Right 2/17/15    SHOULDER SURGERY Bilateral     WRIST SURGERY         Social History     Occupational History    Occupation: Retired   Tobacco Use    Smoking status: Former Smoker     Quit date: 1975     Years since quittin.8    Smokeless tobacco: Never Used   Substance and Sexual Activity    Alcohol use: No    Drug use: No    Sexual activity: Not Currently       Current Outpatient Medications   Medication Sig Dispense Refill    rosuvastatin (CRESTOR) 20 MG tablet Take 20 mg by mouth every other day      empagliflozin (JARDIANCE) 10 MG tablet Take 10 mg by mouth daily      pioglitazone (ACTOS) 30 MG tablet Take 30 mg by mouth daily      atorvastatin (LIPITOR) 80 MG tablet Take 80 mg by mouth nightly      Multiple Vitamins-Minerals (MULTIVITAMIN ADULT PO) Take 1 tablet by mouth daily      zolpidem (AMBIEN) 10 MG tablet Take by mouth nightly.  ibuprofen (ADVIL;MOTRIN) 200 MG tablet Take 200 mg by mouth every 6 hours as needed for Pain      metFORMIN (GLUCOPHAGE) 1000 MG tablet Take 1,000 mg by mouth 2 times daily (with meals)      valsartan-hydrochlorothiazide (DIOVAN-HCT) 160-12.5 MG per tablet Take 1 tablet by mouth daily.  glipiZIDE (GLUCOTROL) 5 MG tablet Take 5 mg by mouth.  sertraline (ZOLOFT) 25 MG tablet Take 25 mg by mouth nightly.  Omega-3 Fatty Acids (FISH OIL) 1000 MG CPDR   Take 1,000 mg by mouth 2 times daily       aspirin 81 MG tablet Take 81 mg by mouth daily.  naproxen (NAPROSYN) 500 MG tablet Take 1 tablet by mouth 2 times daily (with meals) 60 tablet 0    naproxen (NAPROSYN) 500 MG tablet Take 1 tablet by mouth 2 times daily (with meals) for 14 days 28 tablet 0     No current facility-administered medications for this visit. Objective:     He is alert, oriented x 3, pleasant, well nourished, developed and in no   acute distress. /60   Pulse 71   Temp 97.3 °F (36.3 °C)   Ht 5' 10\" (1.778 m)   Wt 182 lb (82.6 kg)   BMI 26.11 kg/m²      Examination right knee:   There is atrophy of the quadriceps musculature. Tenderness over the medial femoral condyle and to a lesser degree medial joint line. No joint effusion. Range of motion right knee passively:  Near full extension  Flexion 125  No pain with weightbearing. X Rays: not performed in the office today:       Diagnosis:       ICD-10-CM    1. Rupture of rectus femoris tendon, right, initial encounter  V88.961W SC ARTHROCENTESIS ASPIR&/INJ MAJOR JT/BURSA W/O US     SC TRIAMCINOLONE ACETONIDE INJ   2. Rupture of quadriceps muscle, right, initial encounter  S76.111A SC ARTHROCENTESIS ASPIR&/INJ MAJOR JT/BURSA W/O US     SC TRIAMCINOLONE ACETONIDE INJ   3. Arthritis of knee, right  M17.11 SC ARTHROCENTESIS ASPIR&/INJ MAJOR JT/BURSA W/O US     SC TRIAMCINOLONE ACETONIDE INJ        Assessment and Plan:       Assessment:  Appears that the brace is fitting reasonably well. It appears to be little bit tight over the medial femoral condyle which may be causing some of his knee pain. He does have knee arthritis. A intra-articular steroid injection may provide some relief of his knee pain. If it does not improve he may want to go back to the brace shop to have the brace refitted. Plan:  Medications-Tylenol. Discussed over-the-counter Voltaren gel to be used over the medial femoral condyle. PT-Home exercise program to be continued. Further Imaging-not indicated at this time. Procedures-     Risk and benefits of corticosteroid intra-articular injection was discussed today. All questions were answered to his satisfaction. He verbally consented to proceed with intra-articular injection today.     Cortisone Injection                                                       PROCEDURE NOTE:     Pre op Diagnosis:  right knee pain     Post op Diagnosis: Same  With the Bridgett Wheeler permission, his right knee was prepped  in standard sterile fashion with  Alcohol and 2 cc of 0.25% Marcaine and 1 cc of Kenalog 40 mg was injected into the right lateral compartment  without difficulty. The patient tolerated this well without difficulty. A band-aid was applied. The patient was advised to ice the knee for 15-20 minutes to relieve any injection site related pain. Follow up:   Call or return to clinic if these symptoms worsen or fail to improve as anticipated.

## 2021-05-11 ENCOUNTER — TELEPHONE (OUTPATIENT)
Dept: ORTHOPEDIC SURGERY | Age: 81
End: 2021-05-11

## 2021-05-11 NOTE — TELEPHONE ENCOUNTER
General Question     Subject: [id number] QUESTIONS  Patient and /or Facility Request: PATIENT  Contact Number: [phone]

## 2021-05-12 NOTE — TELEPHONE ENCOUNTER
Called and discussed with Zane-  Knee arthroplasty would treat knee pain. May not improve function. He would like an appointment with Dr Harriet Herrmann to discuss shoulder arthroplasty and possible knee arthroplasty.

## 2021-05-13 ENCOUNTER — OFFICE VISIT (OUTPATIENT)
Dept: ORTHOPEDIC SURGERY | Age: 81
End: 2021-05-13
Payer: MEDICARE

## 2021-05-13 ENCOUNTER — PREP FOR PROCEDURE (OUTPATIENT)
Dept: ORTHOPEDIC SURGERY | Age: 81
End: 2021-05-13

## 2021-05-13 VITALS — HEIGHT: 70 IN | RESPIRATION RATE: 18 BRPM | BODY MASS INDEX: 26.05 KG/M2 | WEIGHT: 182 LBS

## 2021-05-13 DIAGNOSIS — M19.012 ARTHRITIS OF LEFT SHOULDER REGION: Primary | ICD-10-CM

## 2021-05-13 PROCEDURE — 1123F ACP DISCUSS/DSCN MKR DOCD: CPT | Performed by: ORTHOPAEDIC SURGERY

## 2021-05-13 PROCEDURE — 4040F PNEUMOC VAC/ADMIN/RCVD: CPT | Performed by: ORTHOPAEDIC SURGERY

## 2021-05-13 PROCEDURE — G8427 DOCREV CUR MEDS BY ELIG CLIN: HCPCS | Performed by: ORTHOPAEDIC SURGERY

## 2021-05-13 PROCEDURE — 99213 OFFICE O/P EST LOW 20 MIN: CPT | Performed by: ORTHOPAEDIC SURGERY

## 2021-05-13 PROCEDURE — G8417 CALC BMI ABV UP PARAM F/U: HCPCS | Performed by: ORTHOPAEDIC SURGERY

## 2021-05-13 PROCEDURE — 1036F TOBACCO NON-USER: CPT | Performed by: ORTHOPAEDIC SURGERY

## 2021-05-13 NOTE — PROGRESS NOTES
[facility]  Office Visit    Chief Complaint: Left shoulder pain    HPI:  Dennys Amos is a 78 y.o. who is here for evaluation of his left shoulder. He has been dealing with left shoulder issues since falling about 3 months ago. He is right-hand dominant. He has a history of right reverse total shoulder arthroplasty done about 6 years ago with Dr. Jesus Bates which is doing well. After his fall on the left arm a few months ago, he has had an inability to lift his arm in the air due to a massive rotator cuff tear. He has been treated with injections and physical therapy but continues to have issues using his left arm. He denies neck pain, radiating pain, numbness or tingling in his upper extremities. Pain today is 5/10. He does have diabetes and congestive heart failure. He takes an aspirin a day. He denies tobacco use, history of blood clots. He is currently in a knee brace on the right knee for quad rupture he sustained 6 months ago and walks with a cane.       Patient Active Problem List   Diagnosis    Primary localized osteoarthrosis of shoulder region    S/p reverse total shoulder arthroplasty    Left hand pain    Dorsal wrist ganglion    Trigger finger, left ring finger    Spondylosis of lumbar region without myelopathy or radiculopathy    Spinal stenosis of lumbar region with neurogenic claudication    Sprain of anterior talofibular ligament of right ankle    Plantar fasciitis of right foot    Muscle weakness    Numbness and tingling in both hands    Neuropathy    Bilateral carpal tunnel syndrome    Trigger thumb of left hand    Primary osteoarthritis of left shoulder    Acute pain of left shoulder    Rotator cuff arthropathy of left shoulder    Complete tear of left rotator cuff    Rupture of quadriceps muscle, right, initial encounter    Rupture of rectus femoris tendon, right, initial encounter    Arthritis of knee, right       ROS: Constitutional: denies fever, chills, weight loss  MSK: denies pain in other joints, muscle aches  Neurological: denies numbness, tingling, weakness    Exam:  Resp. rate 18, height 5' 10\" (1.778 m), weight 182 lb (82.6 kg). Appearance: sitting in exam room chair, appears to be in no acute distress, awake and alert  Resp: unlabored breathing on room air  Skin: warm, dry and intact with out erythema or significant increased temperature  LUE: He demonstrates pseudoparalysis with attempted active range of motion of the shoulder. Sensation intact in the axillary, radial, ulnar, median nerve distributions. Negative Neer, Speed, Aurelio, Brennan tests. Sensation is intact light touch. Brisk capillary refill. 2+ radial pulses. Positive drop arm sign. Imaging:  Prior left shoulder radiographs were reviewed and demonstrate mild glenohumeral osteoarthritis    Assessment:  Mild left glenohumeral osteoarthritis with massive rotator cuff tear    Plan:  We discussed the diagnosis and treatment options. He continues to have fairly significant symptoms due to a massive rotator cuff tear on the left shoulder. He has tried physical therapy and injections and continues to have dysfunction on a daily basis. We discussed surgical options including arthroscopic rotator cuff repair which I believe at age 78 would have poor healing potential and high risk of retear. Therefore, I recommended left reverse total shoulder arthroplasty. We discussed treatment options and alternatives in detail. The patient would like to proceed with a shoulder arthroplasty. The patient understands the risks involved including but not limited to: Infection, vessel injury, nerve injury, implant loosening, need for revision surgery, dislocation, intraoperative fracture, and persistent pain. No guarantees were made. All questions were answered. I discussed with the patient the diagnosis in detail and answered all questions.   The patient verbalized understanding of the plan as it has been described above and is in agreement. Plan for left reverse total shoulder arthroplasty. Total time spent on today's encounter was at least 24 minutes. This time included reviewing prior notes, radiographs, and lab results when available, reviewing history obtained by medical assistant, performing history and physical exam, reviewing tests/radiographs with the patient, counseling the patient, ordering medications or tests, documentation in the electronic health record, and coordination of care. This dictation was done with Dragon dictation and may contain mechanical errors related to translation.

## 2021-05-13 NOTE — LETTER
[address] [place] [address] Ortho & Spine  Surgery Scheduling Form:  [facility]    DEMOGRAPHICS:                                                                                                                  Patient Name:  Femi Colón  Patient :  1940   Patient MX#:    Patient Phone:  [phone] (home)                               Patient Address:  [address] [place] [address]    PCP:  Vivian Lopez MD  Insurance:  Payor: MEDICARE / Plan: MEDICARE PART A AND B / Product Type: *No Product type* /   Insurance ID Number:    DIAGNOSIS & PROCEDURE:                                                                                                Diagnosis:    Arthritis Left shoulder                                                                       Diagnosis Orders   1.  Arthritis of left shoulder region  CT SHOULDER LEFT WO CONTRAST    Amb External Referral To [address] [place] [address] Outpatient Physical Therapy [facility]     Operation:  left reverse total shoulder replacement  Location:  [place]  Surgeon:  Alice Olmstead MD    SCHEDULING INFORMATION:                                                                                         .  Surgeon's Scheduling Instruction:  elective    Requested Date:    OR Time:   Patient Arrival Time:      OR Time Required:   2 hours  Anesthesia:  General  Equipment:  Tornier  Mini C-Arm:  No   Standard C-Arm:  No  Status:  Same day admit                                            COVID:      PAT Required:  Yes  Comments:  ALLERGIES:Penicillins and Codeine                          Lucho Paulino MD      21 1:16 PM  BILLING INFORMATION:                                                                                                    Procedure:       CPT Code Modifier            With Cristiane Waggoner, [address] [place] [address]                                                H&P AT:    PCP  ___XX___               URGENT CARE_________       [address] [facility] REPLACEMENT                           1940                         PHYSICIANS ORDERS    HEIGHT:  Ht Readings from Last 1 Encounters:   05/13/21 5' 10\" (1.778 m)                    WEIGHT:  Wt Readings from Last 1 Encounters:   05/13/21 182 lb (82.6 kg)       ALLERGIES:Penicillins and Codeine                          SURG:                              ____________________________________________________________________  PRE-OP ORDERS:  CBC WITH DIFFERENTIAL                                                TYPE AND SCREEN                                                            HgB A1C                                                                               EKG                                                                                        NASAL CULUTRE MRSA  UAR/if positive repeat UAR on admission  BMP  Albumin and Prealbumin  VITAMIN D LEVELS  COAG PROFILE  SED RATE  PT/OT EVAL AND TEACHING  INSTRUCT PT TO STOP ALL NSAIDS, ASPIRIN, BLOOD THINNERS 7 DAYS PRIOR SURGERY  DAY OF SURGERY  CEFAZOLIN 2 GM IVPB; IF PATIENT WEIGHS > 80 KG AND SERUM CREATININE <2.5 mg/dl, GIVE 2 GM DOSE WITHIN 1 HOUR OF INCISION.   IF THE PRE-OP NASAL CULTURE FOR MRSA WAS POSITIVE:   REPEAT NASAL SWAB ON ADMISSION AND ADMINISTER VANCOMYCIN 15 mg x kg, REDUCE THE DOSE OF VANCOMYCIN  MG IVPB IF PT < 55 KG OR SERUM CREATININE > 2mg/dl;also to get Cefazolin 2 GM or wt based  All patients will receive preop Cefazolin 2 GM or wt based  CELEBREX  200 MG  ORALLY  DAY OF SURGERY  Roxicodone 10MG  ORALLY DAY OF SURGERY          OTHER ORDERS:     PHYSICIAN SIGNATURE:     5/13/21 1:16 PM   __________________________DATE:                                                                 SURGERY SCHEDULING TIMES                                                                                                                                                      Chantal Briones 1940                                                                           SURGERY DATE: ___/___/___                                                                      SURGERY TIME:  ___:___ AM/PM                                                                      ARRIVAL TIME:   ___:___  AM/PM                                                                                                                        **PLEASE REPORT TO THE                                                                                                 INFORMATION                                                      DESK IN THE MAIN LOBBY OF THE                                                                       HOSPITAL. [address] [facility]  [address] [place] [address], [address] [place] [address]  [email or web address]    [phone]  Phone             JET INFO     PT NAME:  Shaylee Sanford              Patient Phone:  [phone] (home)                                           1940    PCP:  PCP:  Tasneem Carlos MD                APPT DATE:  ___/___/___      DATE:  21        H&P __________    LABS: _________                      NEEDED:  __________    EKG:   _________                     NEEDED:  __________      JET DATE:   _______/_______      SURG DATE:   ________/________

## 2021-05-17 ENCOUNTER — TELEPHONE (OUTPATIENT)
Dept: ORTHOPEDIC SURGERY | Age: 81
End: 2021-05-17

## 2021-05-17 NOTE — TELEPHONE ENCOUNTER
Surgery and/or Procedure Scheduling     Contact Name: MOSHE   Surgical/Procedure Request: RAMONA, DO NOT WANT TO SCHD [address]   Patient Contact Number: [phone]

## 2021-08-23 ENCOUNTER — OFFICE VISIT (OUTPATIENT)
Dept: ORTHOPEDIC SURGERY | Age: 81
End: 2021-08-23
Payer: MEDICARE

## 2021-08-23 VITALS — HEIGHT: 70 IN | BODY MASS INDEX: 24.62 KG/M2 | WEIGHT: 172 LBS

## 2021-08-23 DIAGNOSIS — M17.11 ARTHRITIS OF KNEE, RIGHT: Primary | ICD-10-CM

## 2021-08-23 PROCEDURE — 1123F ACP DISCUSS/DSCN MKR DOCD: CPT | Performed by: PHYSICIAN ASSISTANT

## 2021-08-23 PROCEDURE — 1036F TOBACCO NON-USER: CPT | Performed by: PHYSICIAN ASSISTANT

## 2021-08-23 PROCEDURE — 4040F PNEUMOC VAC/ADMIN/RCVD: CPT | Performed by: PHYSICIAN ASSISTANT

## 2021-08-23 PROCEDURE — G8420 CALC BMI NORM PARAMETERS: HCPCS | Performed by: PHYSICIAN ASSISTANT

## 2021-08-23 PROCEDURE — 20610 DRAIN/INJ JOINT/BURSA W/O US: CPT | Performed by: PHYSICIAN ASSISTANT

## 2021-08-23 PROCEDURE — G8427 DOCREV CUR MEDS BY ELIG CLIN: HCPCS | Performed by: PHYSICIAN ASSISTANT

## 2021-08-23 PROCEDURE — 99213 OFFICE O/P EST LOW 20 MIN: CPT | Performed by: PHYSICIAN ASSISTANT

## 2021-08-24 NOTE — PROGRESS NOTES
Subjective:      Patient ID: Shaneka Hagen is a 78 y.o. male who is here for follow up evaluation of right medial knee pain. History of mild right knee arthritis. He also has a significant for a rupture of the rectus femoris, quadriceps muscle. He continues to utilize a drop lock hinged knee brace. He states over the past several weeks he has noticed increased pain in the medial aspect of his right knee. He denies any history of injury. His pain complaint is a 4/10 VAS. Review Of Systems:   A 14 point review of systems and history form completed by the patient has been reviewed. This form is scanned in the media tab of the patient's chart under 8/18/2020 date.      Past Medical History:   Diagnosis Date    Anxiety     Arthritis     Diabetes mellitus ([address]     High blood pressure     [place] (hard of hearing)     Hyperlipidemia     Irregular heart beat     Wears glasses     Wears hearing aid in both ears     Wears partial dentures        Family History   Problem Relation Age of Onset    No Known Problems Mother     No Known Problems Father     Heart Disease Brother     Heart Disease Brother        Past Surgical History:   Procedure Laterality Date    COLONOSCOPY      CYST REMOVAL Left 5/21/15    FOOT SURGERY Left     cyst removed from foot    JOINT REPLACEMENT Right     shoulder    LUMBAR SPINE SURGERY Bilateral 6/12/2019    BILATERAL L4 TRANSFORAMINAL EPIDURAL STEROID INJECTION WITH FLUOROSCOPY performed by Esvin Cifuentes MD at [address] [place] [address] Bilateral 6/26/2019    BILATERAL L4 TRANSFORAMINAL EPIDURAL STEROID INJECTION WITH FLUOROSCOPY performed by Esvin Cifuentes MD at [address] [place] [address] N/A 10/14/2019    MICROLUMBAR LAMINECTOMY L4-5, 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, performed by Jordan Carney MD at [place] [address]      tumor removed from neck    SHOULDER ARTHROPLASTY Right 2/17/15    SHOULDER SURGERY Bilateral     WRIST SURGERY         Social History     Occupational History    Occupation: Retired   Tobacco Use    Smoking status: Former Smoker     Quit date: 1975     Years since quittin.3    Smokeless tobacco: Never Used   Vaping Use    Vaping Use: Never used   Substance and Sexual Activity    Alcohol use: No    Drug use: No    Sexual activity: Not Currently       Current Outpatient Medications   Medication Sig Dispense Refill    naproxen (NAPROSYN) 500 MG tablet Take 1 tablet by mouth 2 times daily (with meals) 60 tablet 0    rosuvastatin (CRESTOR) 20 MG tablet Take 20 mg by mouth every other day      empagliflozin (JARDIANCE) 10 MG tablet Take 10 mg by mouth daily      naproxen (NAPROSYN) 500 MG tablet Take 1 tablet by mouth 2 times daily (with meals) for 14 days 28 tablet 0    pioglitazone (ACTOS) 30 MG tablet Take 30 mg by mouth daily      atorvastatin (LIPITOR) 80 MG tablet Take 80 mg by mouth nightly      Multiple Vitamins-Minerals (MULTIVITAMIN ADULT PO) Take 1 tablet by mouth daily      zolpidem (AMBIEN) 10 MG tablet Take by mouth nightly.  ibuprofen (ADVIL;MOTRIN) 200 MG tablet Take 200 mg by mouth every 6 hours as needed for Pain      metFORMIN (GLUCOPHAGE) 1000 MG tablet Take 1,000 mg by mouth 2 times daily (with meals)      valsartan-hydrochlorothiazide (DIOVAN-HCT) 160-12.5 MG per tablet Take 1 tablet by mouth daily.  glipiZIDE (GLUCOTROL) 5 MG tablet Take 5 mg by mouth.  sertraline (ZOLOFT) 25 MG tablet Take 25 mg by mouth nightly.  Omega-3 Fatty Acids (FISH OIL) 1000 MG CPDR   Take 1,000 mg by mouth 2 times daily       aspirin 81 MG tablet Take 81 mg by mouth daily. No current facility-administered medications for this visit. Objective:     He is alert, oriented x 3, pleasant, well nourished, developed and in no   acute distress. Ht 5' 10\" (1.778 m)   Wt 172 lb (78 kg)   BMI 24.68 kg/m²      Examination of the right knee:    Inspection of the skin no erythema. Inspection of the soft tissues minimal swelling. The overall alignment of the knee is neutral.  Gait is mildly antalgic. There is minimal intra-articular effusion. AROM:           PROM:  Extension-         0                   0  Flexion -           130                   130  There mild pain associated with ROM testing. Medial joint line is tender to palpation. Lateral joint line is not tender to palpation. Pes anserine bursa is not tender to palpation. Patellar tendon is not tender to palpation. Quadriceps tendon is not tender to palpation. Collateral ligaments is not tender to palpation. Popliteal fossa is not tender to palpation. Varus Stress testing negative for pain or crepitus. Valgus Stress testing negative for pain or crepitus. Lachman's test negative for  ACL laxity. Anterior Drawer test negative for ACL laxity. Posterior Drawer test negative for PCL laxity. Geraldo's Test negative for meniscus tear. Patellar Compression testing negative for pain or crepitus. Lower extremities:  He has 5/5 motor strength of bilateral lower extremities. He has a negative straight leg raise, bilaterally. Deep tendon reflexes at knees and achilles are 2+. Sensation is intact to light touch L3 to S1 bilaterally. He has no clonus. Examination of the lower extremities shows intact perfusion to both lower extremities. He has no cyanosis and digigts are warm to touch, capillary refill is less than 2 seconds. He has no edema noted. He has intact skin without lacerations or abrasions, no significant erythema, rashes or skin lesions. X Rays: performed in the office today:   AP Standing, Lateral and Sunrise views of right knee:   No acute fractures or dislocations noted. Knee x-rays demonstrate osteoarthritis. Medial compartment-    2/4 Kellgren and Victor M Classification. Lateral compartment-    1/4 Kellgren and Victor M Classification.    PF compartment-          1/4 Kellgren and Victor M Classification. Diagnosis:       ICD-10-CM    1. Arthritis of knee, right  M17.11 XR KNEE RIGHT (3 VIEWS)     GA ARTHROCENTESIS ASPIR&/INJ MAJOR JT/BURSA W/O US     GA TRIAMCINOLONE ACETONIDE INJ        Assessment and Plan:       Assessment:  Mild degenerative arthritis of the right knee involving the medial compartments. Previous intra-articular steroid injection did provide moderate relief. This was last performed on 3/29/2021. I had an extensive discussion with Mr. Ailyn Gonzalez regarding the natural history, etiology, and long term consequences of his condition. I have presented reasonable alternatives to the patient's proposed care, treatment, and services. Risks and benefits of the treatment options also reviewed in detail. I have outlined a treatment plan with them. He has had full opportunity to ask his questions. I have answered them all to his satisfaction. I feel that Mr. Ailyn Gonzalez understands our discussion today. Weight loss, activity modification, home exercise therapy program, NSAID'S, dietary changes have been discussed as a means to help control the symptoms. Non surgical options including cortisone injection, Visco supplementation injection,  Coolief (cooled frequency ablation of the geniculate nerves), stem cell injections, PRP injections were discussed. Surgical option, knee arthroplasty discussed. Plan:  Medications- OTC NSAIDS discussed. He  was advised that NSAID-type medications have several important potential side effects: gastrointestinal irritation including hemorrhage, cardiac events and renal injuries. He was asked to take the medication with food and to stop if he experiences any GI upset. I asked him to call for vomiting, abdominal pain or black/bloody stools. He should have renal function testing per his medical provider periodically.   He expresses understanding of these risks associated with

## 2021-09-28 ENCOUNTER — OFFICE VISIT (OUTPATIENT)
Dept: ORTHOPEDIC SURGERY | Age: 81
End: 2021-09-28
Payer: MEDICARE

## 2021-09-28 VITALS — HEIGHT: 70 IN | BODY MASS INDEX: 24.62 KG/M2 | WEIGHT: 172 LBS | RESPIRATION RATE: 18 BRPM

## 2021-09-28 DIAGNOSIS — G62.9 NEUROPATHY: ICD-10-CM

## 2021-09-28 DIAGNOSIS — M17.11 ARTHRITIS OF KNEE, RIGHT: Primary | ICD-10-CM

## 2021-09-28 PROCEDURE — G8427 DOCREV CUR MEDS BY ELIG CLIN: HCPCS | Performed by: PHYSICIAN ASSISTANT

## 2021-09-28 PROCEDURE — 1123F ACP DISCUSS/DSCN MKR DOCD: CPT | Performed by: PHYSICIAN ASSISTANT

## 2021-09-28 PROCEDURE — 99213 OFFICE O/P EST LOW 20 MIN: CPT | Performed by: PHYSICIAN ASSISTANT

## 2021-09-28 PROCEDURE — 1036F TOBACCO NON-USER: CPT | Performed by: PHYSICIAN ASSISTANT

## 2021-09-28 PROCEDURE — 4040F PNEUMOC VAC/ADMIN/RCVD: CPT | Performed by: PHYSICIAN ASSISTANT

## 2021-09-28 PROCEDURE — G8420 CALC BMI NORM PARAMETERS: HCPCS | Performed by: PHYSICIAN ASSISTANT

## 2021-09-28 RX ORDER — GABAPENTIN 100 MG/1
100 CAPSULE ORAL 3 TIMES DAILY
Qty: 90 CAPSULE | Refills: 0 | Status: SHIPPED | OUTPATIENT
Start: 2021-09-28 | End: 2021-10-12 | Stop reason: SDUPTHER

## 2021-09-28 NOTE — PROGRESS NOTES
Subjective:      Patient ID: Judge Bennett is a 78 y.o. male who is here for follow up evaluation of right knee pain. 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 He is in a drop lock type hinged brace. He underwent a right knee intra-articular steroid injection 8/23/2021 and reports minimal relief. She is believes this pain is coming from a nerve. He has pain over the medial femoral condyle and medial aspect of the knee. He believes the brace tends to rub her head on the nerve causing his pain. Review Of Systems:   Musculoskeletal ROS:   Negative for fever or chills. Negative for numbness or tingling right lower extremity.        Past Medical History:   Diagnosis Date    Anxiety     Arthritis     Diabetes mellitus ([address]     High blood pressure     [place] (hard of hearing)     Hyperlipidemia     Irregular heart beat     Wears glasses     Wears hearing aid in both ears     Wears partial dentures        Family History   Problem Relation Age of Onset    No Known Problems Mother     No Known Problems Father     Heart Disease Brother     Heart Disease Brother        Past Surgical History:   Procedure Laterality Date    COLONOSCOPY      CYST REMOVAL Left 5/21/15    FOOT SURGERY Left     cyst removed from foot    JOINT REPLACEMENT Right     shoulder    LUMBAR SPINE SURGERY Bilateral 6/12/2019    BILATERAL L4 TRANSFORAMINAL EPIDURAL STEROID INJECTION WITH FLUOROSCOPY performed by Yeimi Ahn MD at [address] [place] [address] Bilateral 6/26/2019    BILATERAL L4 TRANSFORAMINAL EPIDURAL STEROID INJECTION WITH FLUOROSCOPY performed by Yeimi Ahn MD at [address] [place] [address] N/A 10/14/2019    MICROLUMBAR LAMINECTOMY L4-5, 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, performed by Ayla Alston MD at [facility]      tumor removed from neck    SHOULDER ARTHROPLASTY Right 2/17/15    SHOULDER SURGERY Bilateral to his satisfaction. I feel that Mr. Rip Cabral understands our discussion today. Plan:  Medications- OTC NSAIDS discussed. He  was advised that NSAID-type medications have several important potential side effects: gastrointestinal irritation including hemorrhage, cardiac events and renal injuries. He was asked to take the medication with food and to stop if he experiences any GI upset. I asked him to call for vomiting, abdominal pain or black/bloody stools. He should have renal function testing per his medical provider periodically. He expresses understanding of these risks associated with NSAID use and questions were answered. He is interested in trying gabapentin 100 mg titrating up to 3 times daily. Procedures-no surgical intervention needed or required at this time. Follow up- 4-6 weeks. Call or return to clinic if these symptoms worsen or fail to improve as anticipated. Rhona Angel PA-C   Senior Physician Assistant   [facility]                                         Disclaimer: This note was generated with use of a verbal recognition program (DRAGON) and an attempt was made to check for errors. It is possible that there are still dictated errors within this office note. If so, please bring any significant errors to my attention for an addendum. All efforts were made to ensure that this office note is accurate.

## 2021-10-12 ENCOUNTER — TELEPHONE (OUTPATIENT)
Dept: ORTHOPEDIC SURGERY | Age: 81
End: 2021-10-12

## 2021-10-12 RX ORDER — GABAPENTIN 100 MG/1
100 CAPSULE ORAL 3 TIMES DAILY
Qty: 90 CAPSULE | Refills: 5 | Status: SHIPPED | OUTPATIENT
Start: 2021-10-12 | End: 2022-06-14 | Stop reason: SDUPTHER

## 2022-03-03 ENCOUNTER — OFFICE VISIT (OUTPATIENT)
Dept: ORTHOPEDIC SURGERY | Age: 82
End: 2022-03-03
Payer: MEDICARE

## 2022-03-03 VITALS — WEIGHT: 172 LBS | BODY MASS INDEX: 24.62 KG/M2 | HEIGHT: 70 IN

## 2022-03-03 DIAGNOSIS — M19.012 ARTHRITIS OF LEFT SHOULDER REGION: Primary | ICD-10-CM

## 2022-03-03 PROCEDURE — G8427 DOCREV CUR MEDS BY ELIG CLIN: HCPCS | Performed by: ORTHOPAEDIC SURGERY

## 2022-03-03 PROCEDURE — 1036F TOBACCO NON-USER: CPT | Performed by: ORTHOPAEDIC SURGERY

## 2022-03-03 PROCEDURE — G8484 FLU IMMUNIZE NO ADMIN: HCPCS | Performed by: ORTHOPAEDIC SURGERY

## 2022-03-03 PROCEDURE — G8420 CALC BMI NORM PARAMETERS: HCPCS | Performed by: ORTHOPAEDIC SURGERY

## 2022-03-03 PROCEDURE — 4040F PNEUMOC VAC/ADMIN/RCVD: CPT | Performed by: ORTHOPAEDIC SURGERY

## 2022-03-03 PROCEDURE — 1123F ACP DISCUSS/DSCN MKR DOCD: CPT | Performed by: ORTHOPAEDIC SURGERY

## 2022-03-03 PROCEDURE — 99213 OFFICE O/P EST LOW 20 MIN: CPT | Performed by: ORTHOPAEDIC SURGERY

## 2022-03-03 NOTE — PROGRESS NOTES
[facility]  Office Visit    Chief Complaint: Left shoulder pain    HPI:  Bing Brooks is a 78 y.o. who is here for evaluation of his left shoulder. He was last seen in May 2021. He has been dealing with left shoulder issues for at least the past 1 year. He is right-hand dominant. He has a history of right reverse total shoulder arthroplasty done about 6 years ago with Dr. Claudean Biles that is doing well. After his fall on the left arm last year, he has had an inability to lift his arm in the air due to a massive rotator cuff tear. He has been treated with injections and physical therapy but continues to have issues using his left arm. He denies neck pain, radiating pain, numbness or tingling in his upper extremities. Pain today is 5/10. He does have diabetes and congestive heart failure. He takes an aspirin a day. He denies tobacco use, history of blood clots. He walks with a walker.       Patient Active Problem List   Diagnosis    Primary localized osteoarthrosis of shoulder region    S/p reverse total shoulder arthroplasty    Left hand pain    Dorsal wrist ganglion    Trigger finger, left ring finger    Spondylosis of lumbar region without myelopathy or radiculopathy    Spinal stenosis of lumbar region with neurogenic claudication    Sprain of anterior talofibular ligament of right ankle    Plantar fasciitis of right foot    Muscle weakness    Numbness and tingling in both hands    Neuropathy    Bilateral carpal tunnel syndrome    Trigger thumb of left hand    Primary osteoarthritis of left shoulder    Acute pain of left shoulder    Rotator cuff arthropathy of left shoulder    Complete tear of left rotator cuff    Rupture of quadriceps muscle, right, initial encounter    Rupture of rectus femoris tendon, right, initial encounter    Arthritis of knee, right       ROS:  Constitutional: denies fever, chills, weight loss  MSK: denies pain in other joints, muscle aches  Neurological: denies numbness, tingling, weakness    Exam:  Height 5' 10\" (1.778 m), weight 172 lb (78 kg). Appearance: sitting in exam room chair, appears to be in no acute distress, awake and alert  Resp: unlabored breathing on room air  Skin: warm, dry and intact with out erythema or significant increased temperature  LUE: He demonstrates pseudoparalysis with attempted active range of motion of the shoulder. Sensation intact in the axillary, radial, ulnar, median nerve distributions. Negative Neer, Speed, Aurelio, Brennan tests. Sensation is intact light touch. Brisk capillary refill. 2+ radial pulses. Positive drop arm sign. Imaging:  3 views of the left shoulder were performed and interpreted today. Radiographs are significant for mild glenohumeral osteoarthritis and mild proximal migration of the humeral head. There are degenerative changes at the greater tuberosity. Bone mineralization is decreased. Assessment:  Left shoulder rotator cuff tear arthropathy    Plan:  We discussed the diagnosis and treatment options. He continues to have fairly significant symptoms due to a massive rotator cuff tear on the left shoulder. He has tried physical therapy and injections and continues to have dysfunction on a daily basis. We discussed left reverse total shoulder arthroplasty. We discussed treatment options and alternatives in detail. The patient would like to proceed with a shoulder arthroplasty. The patient understands the risks involved including but not limited to: Infection, vessel injury, nerve injury, implant loosening, need for revision surgery, dislocation, intraoperative fracture, and persistent pain. No guarantees were made. All questions were answered. I discussed with the patient the diagnosis in detail and answered all questions. The patient verbalized understanding of the plan as it has been described above and is in agreement.      Plan for left reverse total shoulder arthroplasty. Total time spent on today's encounter was at least 24 minutes. This time included reviewing prior notes, radiographs, and lab results when available, reviewing history obtained by medical assistant, performing history and physical exam, reviewing tests/radiographs with the patient, counseling the patient, ordering medications or tests, documentation in the electronic health record, and coordination of care. This dictation was done with Dragon dictation and may contain mechanical errors related to translation.

## 2022-03-15 ENCOUNTER — HOSPITAL ENCOUNTER (OUTPATIENT)
Dept: CT IMAGING | Age: 82
Discharge: HOME | End: 2022-03-15
Payer: MEDICARE

## 2022-03-15 DIAGNOSIS — M19.012 ARTHRITIS OF LEFT SHOULDER REGION: ICD-10-CM

## 2022-03-15 PROCEDURE — 73200 CT UPPER EXTREMITY W/O DYE: CPT

## 2022-03-19 NOTE — PROGRESS NOTES
Kenalog:  NDC: [id number]  Lot Number: [id number]  Expiration Date: 05/2022 Writer called into the patient's room stating that the patient was unresponsive; RT was standing at right side of bed looking at patient; Dovie Opitz came into the room; and completed a sternum rub and patient responded with saying stop; writer picked up monitor and heart rate read 60, blood pressure 99/60 98% 2 liters via nasal cannula 98.7 blood sugar 128  After patient was more alert blood pressure rechecked 106/67 64 97% 98.4 and Nursing Supervisor made aware.

## 2022-03-25 ENCOUNTER — TELEPHONE (OUTPATIENT)
Dept: ORTHOPEDIC SURGERY | Age: 82
End: 2022-03-25

## 2022-03-25 NOTE — TELEPHONE ENCOUNTER
Auth: NPR  Date: 04/19/22  Reference # None  Spoke with: None  Type of SX: Inpatient  Location: [facility]  CPT: 97791   DX: M19.012  SX area: LT shoulder  Insurance: Medicare A&B

## 2022-04-06 ENCOUNTER — TELEPHONE (OUTPATIENT)
Dept: ORTHOPEDIC SURGERY | Age: 82
End: 2022-04-06

## 2022-04-06 NOTE — TELEPHONE ENCOUNTER
Message left or spoke to patient reminding them up upcoming JET class at [facility] and time.

## 2022-04-11 ENCOUNTER — TELEPHONE (OUTPATIENT)
Dept: ORTHOPEDIC SURGERY | Age: 82
End: 2022-04-11

## 2022-04-11 ENCOUNTER — HOSPITAL ENCOUNTER (OUTPATIENT)
Dept: PREADMISSION TESTING | Age: 82
Discharge: HOME | End: 2022-04-15
Payer: MEDICARE

## 2022-04-11 DIAGNOSIS — M19.012 ARTHRITIS OF LEFT SHOULDER REGION: ICD-10-CM

## 2022-04-11 LAB
ABO/RH: NORMAL
ALBUMIN SERPL-MCNC: 4.4 G/DL (ref 3.4–5)
ANION GAP SERPL CALCULATED.3IONS-SCNC: 14 MMOL/L (ref 3–16)
ANTIBODY SCREEN: NORMAL
APTT: 32.1 SEC (ref 26.2–38.6)
BASOPHILS ABSOLUTE: 0 K/UL (ref 0–0.2)
BASOPHILS RELATIVE PERCENT: 0.9 %
BILIRUBIN URINE: NEGATIVE
BLOOD, URINE: NEGATIVE
BUN BLDV-MCNC: 17 MG/DL (ref 7–20)
CALCIUM SERPL-MCNC: 9.5 MG/DL (ref 8.3–10.6)
CHLORIDE BLD-SCNC: 104 MMOL/L (ref 99–110)
CLARITY: CLEAR
CO2: 24 MMOL/L (ref 21–32)
COLOR: ABNORMAL
CREAT SERPL-MCNC: 0.7 MG/DL (ref 0.8–1.3)
EKG ATRIAL RATE: 69 BPM
EKG DIAGNOSIS: NORMAL
EKG P AXIS: 20 DEGREES
EKG P-R INTERVAL: 216 MS
EKG Q-T INTERVAL: 406 MS
EKG QRS DURATION: 142 MS
EKG QTC CALCULATION (BAZETT): 435 MS
EKG R AXIS: -73 DEGREES
EKG T AXIS: 34 DEGREES
EKG VENTRICULAR RATE: 69 BPM
EOSINOPHILS ABSOLUTE: 0.1 K/UL (ref 0–0.6)
EOSINOPHILS RELATIVE PERCENT: 1.4 %
ESTIMATED AVERAGE GLUCOSE: 200.1 MG/DL
GFR AFRICAN AMERICAN: >60
GFR NON-AFRICAN AMERICAN: >60
GLUCOSE BLD-MCNC: 276 MG/DL (ref 70–99)
GLUCOSE URINE: >=1000 MG/DL
HBA1C MFR BLD: 8.6 %
HCT VFR BLD CALC: 42.4 % (ref 40.5–52.5)
HEMOGLOBIN: 14.1 G/DL (ref 13.5–17.5)
INR BLD: 0.86 (ref 0.88–1.12)
KETONES, URINE: NEGATIVE MG/DL
LEUKOCYTE ESTERASE, URINE: NEGATIVE
LYMPHOCYTES ABSOLUTE: 0.8 K/UL (ref 1–5.1)
LYMPHOCYTES RELATIVE PERCENT: 17.2 %
MCH RBC QN AUTO: 30.4 PG (ref 26–34)
MCHC RBC AUTO-ENTMCNC: 33.3 G/DL (ref 31–36)
MCV RBC AUTO: 91.4 FL (ref 80–100)
MICROSCOPIC EXAMINATION: ABNORMAL
MONOCYTES ABSOLUTE: 0.4 K/UL (ref 0–1.3)
MONOCYTES RELATIVE PERCENT: 8.7 %
NEUTROPHILS ABSOLUTE: 3.2 K/UL (ref 1.7–7.7)
NEUTROPHILS RELATIVE PERCENT: 71.8 %
NITRITE, URINE: NEGATIVE
PDW BLD-RTO: 13.6 % (ref 12.4–15.4)
PH UA: 5.5 (ref 5–8)
PLATELET # BLD: 159 K/UL (ref 135–450)
PMV BLD AUTO: 9.2 FL (ref 5–10.5)
POTASSIUM SERPL-SCNC: 5.1 MMOL/L (ref 3.5–5.1)
PREALBUMIN: 31.6 MG/DL (ref 20–40)
PROTEIN UA: NEGATIVE MG/DL
PROTHROMBIN TIME: 9.7 SEC (ref 9.9–12.7)
RBC # BLD: 4.64 M/UL (ref 4.2–5.9)
SODIUM BLD-SCNC: 142 MMOL/L (ref 136–145)
SPECIFIC GRAVITY UA: 1.01 (ref 1–1.03)
URINE REFLEX TO CULTURE: ABNORMAL
URINE TYPE: ABNORMAL
UROBILINOGEN, URINE: 0.2 E.U./DL
VITAMIN D 25-HYDROXY: 46.9 NG/ML
WBC # BLD: 4.4 K/UL (ref 4–11)

## 2022-04-11 PROCEDURE — 86900 BLOOD TYPING SEROLOGIC ABO: CPT

## 2022-04-11 PROCEDURE — 93010 ELECTROCARDIOGRAM REPORT: CPT | Performed by: INTERNAL MEDICINE

## 2022-04-11 PROCEDURE — 80048 BASIC METABOLIC PNL TOTAL CA: CPT

## 2022-04-11 PROCEDURE — 82306 VITAMIN D 25 HYDROXY: CPT

## 2022-04-11 PROCEDURE — 83036 HEMOGLOBIN GLYCOSYLATED A1C: CPT

## 2022-04-11 PROCEDURE — 86850 RBC ANTIBODY SCREEN: CPT

## 2022-04-11 PROCEDURE — 84134 ASSAY OF PREALBUMIN: CPT

## 2022-04-11 PROCEDURE — 85025 COMPLETE CBC W/AUTO DIFF WBC: CPT

## 2022-04-11 PROCEDURE — 82040 ASSAY OF SERUM ALBUMIN: CPT

## 2022-04-11 PROCEDURE — 85610 PROTHROMBIN TIME: CPT

## 2022-04-11 PROCEDURE — 81003 URINALYSIS AUTO W/O SCOPE: CPT

## 2022-04-11 PROCEDURE — 86901 BLOOD TYPING SEROLOGIC RH(D): CPT

## 2022-04-11 PROCEDURE — 85730 THROMBOPLASTIN TIME PARTIAL: CPT

## 2022-04-11 PROCEDURE — 87641 MR-STAPH DNA AMP PROBE: CPT

## 2022-04-11 PROCEDURE — 93005 ELECTROCARDIOGRAM TRACING: CPT

## 2022-04-11 NOTE — PROGRESS NOTES
Patient attended JET class on 4/11/2022. Patient verified surgery for Total shoulder replacement. Patient received patient information and educational JET folder including the following handouts: jet powerpoint, covid-19 restrictions, ERAS, incentive spirometry including purpose and how to perform, case management contact information, hand hygiene, preventing constipation, home health care agency list, skilled nursing facility list, pre-operative showering techniques and the use of anti-septic 3 days before surgery. Interviews completed by  OT, and PAT. Labs and Tests completed as ordered/necessary. Anti-septic bottle given to patient to take home. Patient states no further questions or concerns. Patient provided orthopedic office and nurse navigator contact information. DOS: 4/19/22  Dr La Nena Severino: home with wife orin ;if applicable. Transportation:dick (daughter)  DME needs:denies, already hasa  rollator and cane. Uses cane preop for short distances and rollator for long distances. May need pt/ot post-op and possibly home health care. Per Patient Will see/Saw PCP on 4/12/22.      Electronically signed by Niecy Hartman RN on 4/11/2022 at 5:14 PM

## 2022-04-11 NOTE — TELEPHONE ENCOUNTER
----- Message from Junie Mohr MD sent at 4/11/2022  2:31 PM EDT -----  A1c 8.6. Please cancel  ----- Message -----  From: Juan Priest Incoming Lab Results From Soft (Epic Adt)  Sent: 4/11/2022  11:32 AM EDT  To:  Junie Mohr MD

## 2022-04-12 LAB — MRSA SCREEN RT-PCR: NORMAL

## 2022-06-02 ENCOUNTER — OFFICE VISIT (OUTPATIENT)
Dept: ORTHOPEDIC SURGERY | Age: 82
End: 2022-06-02
Payer: MEDICARE

## 2022-06-02 VITALS — BODY MASS INDEX: 23.62 KG/M2 | WEIGHT: 165 LBS | HEIGHT: 70 IN

## 2022-06-02 DIAGNOSIS — Z96.611 H/O TOTAL SHOULDER REPLACEMENT, RIGHT: Primary | ICD-10-CM

## 2022-06-02 PROCEDURE — G8420 CALC BMI NORM PARAMETERS: HCPCS | Performed by: PHYSICIAN ASSISTANT

## 2022-06-02 PROCEDURE — 1036F TOBACCO NON-USER: CPT | Performed by: PHYSICIAN ASSISTANT

## 2022-06-02 PROCEDURE — G8427 DOCREV CUR MEDS BY ELIG CLIN: HCPCS | Performed by: PHYSICIAN ASSISTANT

## 2022-06-02 PROCEDURE — 1123F ACP DISCUSS/DSCN MKR DOCD: CPT | Performed by: PHYSICIAN ASSISTANT

## 2022-06-02 PROCEDURE — 99213 OFFICE O/P EST LOW 20 MIN: CPT | Performed by: PHYSICIAN ASSISTANT

## 2022-06-05 NOTE — PROGRESS NOTES
This dictation was done with F[employer] dictation and may contain mechanical errors related to translation. I have today reviewed with Tobi Bingham the clinically relevant, past medical history, medications, allergies, family history, social history, and Review Of Systems form the patients most recent history form & I have documented any details relevant to today's presenting complaints in my history below. Mr. Liz Hay's self-reported past medical history, medications, allergies, family history, social history, and Review Of Systems form has been scanned into the chart under the \"Media\" tab. Subjective:  Tobi Bingham is a 78 y.o. who is here in follow-up for his right reverse total shoulder done on 2/17/2015. This is his annual follow-up his right shoulder is doing well unfortunately his left shoulder started to hurt more more he has recent x-rays that show osteoarthritis degenerative changes and at some point he is looking at needing her left reverse total shoulder done. His most recent A1c was above 8 at 8.5 prior to that it was below 8. We will have him work with his primary care physician and he is due for a motor lab test around the third month in July.       Patient Active Problem List   Diagnosis    Primary localized osteoarthrosis of shoulder region    S/p reverse total shoulder arthroplasty    Left hand pain    Dorsal wrist ganglion    Trigger finger, left ring finger    Spondylosis of lumbar region without myelopathy or radiculopathy    Spinal stenosis of lumbar region with neurogenic claudication    Sprain of anterior talofibular ligament of right ankle    Plantar fasciitis of right foot    Muscle weakness    Numbness and tingling in both hands    Neuropathy    Bilateral carpal tunnel syndrome    Trigger thumb of left hand    Primary osteoarthritis of left shoulder    Acute pain of left shoulder    Rotator cuff arthropathy of left shoulder    Complete tear of left rotator cuff    Rupture of quadriceps muscle, right, initial encounter    Rupture of rectus femoris tendon, right, initial encounter    Arthritis of knee, right           Current Outpatient Medications on File Prior to Visit   Medication Sig Dispense Refill    Empagliflozin-linaGLIPtin (GLYXAMBI) 25-5 MG TABS Take by mouth daily      Cholecalciferol (VITAMIN D3) 50 MCG (2000 UT) CAPS Take by mouth daily      vitamin B-12 (CYANOCOBALAMIN) 500 MCG tablet Take 500 mcg by mouth every other day      mirabegron (MYRBETRIQ) 50 MG TB24 Take 50 mg by mouth daily      rosuvastatin (CRESTOR) 20 MG tablet Take 20 mg by mouth every other day      empagliflozin (JARDIANCE) 10 MG tablet Take 10 mg by mouth daily      pioglitazone (ACTOS) 30 MG tablet Take 45 mg by mouth daily       atorvastatin (LIPITOR) 80 MG tablet Take 80 mg by mouth nightly      Multiple Vitamins-Minerals (MULTIVITAMIN ADULT PO) Take 1 tablet by mouth daily      zolpidem (AMBIEN) 10 MG tablet Take by mouth nightly.  ibuprofen (ADVIL;MOTRIN) 200 MG tablet Take 200 mg by mouth every 6 hours as needed for Pain      metFORMIN (GLUCOPHAGE) 1000 MG tablet Take 1,000 mg by mouth 2 times daily (with meals)      glipiZIDE (GLUCOTROL) 5 MG tablet Take 5 mg by mouth.  sertraline (ZOLOFT) 25 MG tablet Take 25 mg by mouth nightly.  Omega-3 Fatty Acids (FISH OIL) 1000 MG CPDR Take 1,200 mg by mouth daily       aspirin 81 MG tablet Take 81 mg by mouth daily.  gabapentin (NEURONTIN) 100 MG capsule Take 1 capsule by mouth 3 times daily for 180 days. Intended supply: 30 days 90 capsule 5     No current facility-administered medications on file prior to visit. Objective:   Height 5' 10\" (1.778 m), weight 165 lb (74.8 kg).     On examination is pleasant 78-year-old gentleman he is got comfortable forward flexion and abduction on the right side left side has a lot more pain and is limited with about 170 degrees forward flexion and about

## 2022-06-13 ENCOUNTER — TELEPHONE (OUTPATIENT)
Dept: ORTHOPEDIC SURGERY | Age: 82
End: 2022-06-13

## 2022-06-13 NOTE — TELEPHONE ENCOUNTER
Prescription Refill     Medication Name:  GABAPENTIN     Pharmacy: [facility]   Address: [address] [place] [address]  Phone: [phone]    Patient Contact Number:  [phone]

## 2022-06-14 RX ORDER — GABAPENTIN 100 MG/1
100 CAPSULE ORAL 3 TIMES DAILY
Qty: 90 CAPSULE | Refills: 5 | Status: SHIPPED | OUTPATIENT
Start: 2022-06-14 | End: 2022-12-11

## 2022-06-24 ENCOUNTER — TELEPHONE (OUTPATIENT)
Dept: ORTHOPEDIC SURGERY | Age: 82
End: 2022-06-24

## 2022-06-24 DIAGNOSIS — E13.69 OTHER SPECIFIED DIABETES MELLITUS WITH OTHER SPECIFIED COMPLICATION, UNSPECIFIED WHETHER LONG TERM INSULIN USE (HCC): Primary | ICD-10-CM

## 2022-06-24 NOTE — TELEPHONE ENCOUNTER
General Question     Subject: REGARDING A1C  Patient and /or Facility Request: Marcelo Gibson  Contact Number: [phone]    PATIENT'S WIFE CALLING REGARDING NEEDING THE OFFICE TO FAX SOMETHING OVER TO DR. Caitlin Alva REGARDING HER 'S A1C. THE FAX NUMBER IS: [phone]. PATIENT'S WIFE REQUESTING TO SPEAK TO SOMEONE AND REQUESTING A CALL BACK AT THE ABOVE NUMBER.

## 2022-07-08 ENCOUNTER — TELEPHONE (OUTPATIENT)
Dept: ORTHOPEDIC SURGERY | Age: 82
End: 2022-07-08

## 2022-07-08 NOTE — TELEPHONE ENCOUNTER
I spoke with patient and told him that [facility] will be calling him on Tuesday. He was [facility] with that. Patient needs Left reverse TSA. Last seen 3/3/22.     Message sent to [facility]

## 2022-07-12 ENCOUNTER — TELEPHONE (OUTPATIENT)
Dept: ORTHOPEDIC SURGERY | Age: 82
End: 2022-07-12

## 2022-07-12 NOTE — TELEPHONE ENCOUNTER
I called the patient and told him Herswilliam Cochran is out of the office and will call him sometime this week to set op surgery

## 2022-07-14 ENCOUNTER — PREP FOR PROCEDURE (OUTPATIENT)
Dept: ORTHOPEDIC SURGERY | Age: 82
End: 2022-07-14

## 2022-07-14 DIAGNOSIS — M19.012 ARTHRITIS OF LEFT SHOULDER REGION: Primary | ICD-10-CM

## 2022-07-14 NOTE — TELEPHONE ENCOUNTER
Surgery and/or Procedure Scheduling     Contact Name: [place] [facility]  Surgical/Procedure Request: right shoulder   Patient Contact Number: [phone]

## 2022-08-09 ENCOUNTER — TELEPHONE (OUTPATIENT)
Dept: ORTHOPEDIC SURGERY | Age: 82
End: 2022-08-09

## 2022-08-17 ENCOUNTER — HOSPITAL ENCOUNTER (OUTPATIENT)
Age: 82
Discharge: HOME | End: 2022-08-17
Payer: MEDICARE

## 2022-08-17 ENCOUNTER — PREP FOR PROCEDURE (OUTPATIENT)
Dept: ORTHOPEDICS UNIT | Age: 82
End: 2022-08-17

## 2022-08-17 DIAGNOSIS — E13.69 OTHER SPECIFIED DIABETES MELLITUS WITH OTHER SPECIFIED COMPLICATION, UNSPECIFIED WHETHER LONG TERM INSULIN USE (HCC): ICD-10-CM

## 2022-08-17 DIAGNOSIS — M19.012 ARTHRITIS OF LEFT SHOULDER REGION: ICD-10-CM

## 2022-08-17 LAB
ABO/RH: NORMAL
ALBUMIN SERPL-MCNC: 4.4 G/DL (ref 3.4–5)
ANION GAP SERPL CALCULATED.3IONS-SCNC: 13 MMOL/L (ref 3–16)
ANTIBODY SCREEN: NORMAL
APTT: 29.7 SEC (ref 23–34.3)
BASOPHILS ABSOLUTE: 0 K/UL (ref 0–0.2)
BASOPHILS RELATIVE PERCENT: 0.6 %
BILIRUBIN URINE: NEGATIVE
BLOOD, URINE: NEGATIVE
BUN BLDV-MCNC: 29 MG/DL (ref 7–20)
CALCIUM SERPL-MCNC: 9.2 MG/DL (ref 8.3–10.6)
CHLORIDE BLD-SCNC: 106 MMOL/L (ref 99–110)
CLARITY: CLEAR
CO2: 24 MMOL/L (ref 21–32)
COLOR: YELLOW
CREAT SERPL-MCNC: 0.7 MG/DL (ref 0.8–1.3)
EOSINOPHILS ABSOLUTE: 0.2 K/UL (ref 0–0.6)
EOSINOPHILS RELATIVE PERCENT: 3.2 %
ESTIMATED AVERAGE GLUCOSE: 197.3 MG/DL
GFR AFRICAN AMERICAN: >60
GFR NON-AFRICAN AMERICAN: >60
GLUCOSE BLD-MCNC: 129 MG/DL (ref 70–99)
GLUCOSE URINE: >=1000 MG/DL
HBA1C MFR BLD: 8.5 %
HCT VFR BLD CALC: 41.3 % (ref 40.5–52.5)
HEMOGLOBIN: 13.9 G/DL (ref 13.5–17.5)
INR BLD: 1.07 (ref 0.87–1.14)
KETONES, URINE: NEGATIVE MG/DL
LEUKOCYTE ESTERASE, URINE: NEGATIVE
LYMPHOCYTES ABSOLUTE: 0.9 K/UL (ref 1–5.1)
LYMPHOCYTES RELATIVE PERCENT: 19.4 %
MCH RBC QN AUTO: 30.6 PG (ref 26–34)
MCHC RBC AUTO-ENTMCNC: 33.8 G/DL (ref 31–36)
MCV RBC AUTO: 90.7 FL (ref 80–100)
MICROSCOPIC EXAMINATION: ABNORMAL
MONOCYTES ABSOLUTE: 0.5 K/UL (ref 0–1.3)
MONOCYTES RELATIVE PERCENT: 9.5 %
NEUTROPHILS ABSOLUTE: 3.3 K/UL (ref 1.7–7.7)
NEUTROPHILS RELATIVE PERCENT: 67.3 %
NITRITE, URINE: NEGATIVE
PDW BLD-RTO: 13.4 % (ref 12.4–15.4)
PH UA: 5 (ref 5–8)
PLATELET # BLD: 154 K/UL (ref 135–450)
PMV BLD AUTO: 8.7 FL (ref 5–10.5)
POTASSIUM SERPL-SCNC: 4.4 MMOL/L (ref 3.5–5.1)
PREALBUMIN: 29.3 MG/DL (ref 20–40)
PROTEIN UA: NEGATIVE MG/DL
PROTHROMBIN TIME: 13.8 SEC (ref 11.7–14.5)
RBC # BLD: 4.55 M/UL (ref 4.2–5.9)
SODIUM BLD-SCNC: 143 MMOL/L (ref 136–145)
SPECIFIC GRAVITY UA: 1.02 (ref 1–1.03)
URINE REFLEX TO CULTURE: ABNORMAL
URINE TYPE: ABNORMAL
UROBILINOGEN, URINE: 0.2 E.U./DL
VITAMIN D 25-HYDROXY: 51.7 NG/ML
WBC # BLD: 4.9 K/UL (ref 4–11)

## 2022-08-17 PROCEDURE — 85025 COMPLETE CBC W/AUTO DIFF WBC: CPT

## 2022-08-17 PROCEDURE — 80048 BASIC METABOLIC PNL TOTAL CA: CPT

## 2022-08-17 PROCEDURE — 86901 BLOOD TYPING SEROLOGIC RH(D): CPT

## 2022-08-17 PROCEDURE — 81003 URINALYSIS AUTO W/O SCOPE: CPT

## 2022-08-17 PROCEDURE — 86850 RBC ANTIBODY SCREEN: CPT

## 2022-08-17 PROCEDURE — 86900 BLOOD TYPING SEROLOGIC ABO: CPT

## 2022-08-17 PROCEDURE — 87641 MR-STAPH DNA AMP PROBE: CPT

## 2022-08-17 PROCEDURE — 83036 HEMOGLOBIN GLYCOSYLATED A1C: CPT

## 2022-08-17 PROCEDURE — 85610 PROTHROMBIN TIME: CPT

## 2022-08-17 PROCEDURE — 84134 ASSAY OF PREALBUMIN: CPT

## 2022-08-17 PROCEDURE — 36415 COLL VENOUS BLD VENIPUNCTURE: CPT

## 2022-08-17 PROCEDURE — 85730 THROMBOPLASTIN TIME PARTIAL: CPT

## 2022-08-17 PROCEDURE — 82306 VITAMIN D 25 HYDROXY: CPT

## 2022-08-17 PROCEDURE — 82040 ASSAY OF SERUM ALBUMIN: CPT

## 2022-08-17 RX ORDER — TRANEXAMIC ACID 650 1/1
1950 TABLET ORAL ONCE
Status: CANCELLED | OUTPATIENT
Start: 2022-08-17 | End: 2022-08-17

## 2022-08-17 RX ORDER — MELOXICAM 7.5 MG/1
7.5 TABLET ORAL ONCE
Status: CANCELLED | OUTPATIENT
Start: 2022-08-17 | End: 2022-08-17

## 2022-08-17 RX ORDER — OXYCODONE HYDROCHLORIDE 10 MG/1
10 TABLET ORAL ONCE
Status: CANCELLED | OUTPATIENT
Start: 2022-08-17 | End: 2022-08-17

## 2022-08-18 LAB — MRSA SCREEN RT-PCR: NORMAL

## 2022-12-20 RX ORDER — GABAPENTIN 100 MG/1
CAPSULE ORAL
Qty: 90 CAPSULE | Refills: 2 | Status: SHIPPED | OUTPATIENT
Start: 2022-12-20 | End: 2023-03-20

## 2023-03-13 ENCOUNTER — OFFICE VISIT (OUTPATIENT)
Dept: ORTHOPEDIC SURGERY | Age: 83
End: 2023-03-13
Payer: MEDICARE

## 2023-03-13 VITALS — RESPIRATION RATE: 16 BRPM | BODY MASS INDEX: 24.34 KG/M2 | HEIGHT: 70 IN | WEIGHT: 170 LBS

## 2023-03-13 DIAGNOSIS — M71.121 SEPTIC OLECRANON BURSITIS OF RIGHT ELBOW: Primary | ICD-10-CM

## 2023-03-13 PROCEDURE — 99213 OFFICE O/P EST LOW 20 MIN: CPT | Performed by: ORTHOPAEDIC SURGERY

## 2023-03-13 PROCEDURE — 1036F TOBACCO NON-USER: CPT | Performed by: ORTHOPAEDIC SURGERY

## 2023-03-13 PROCEDURE — G8484 FLU IMMUNIZE NO ADMIN: HCPCS | Performed by: ORTHOPAEDIC SURGERY

## 2023-03-13 PROCEDURE — G8427 DOCREV CUR MEDS BY ELIG CLIN: HCPCS | Performed by: ORTHOPAEDIC SURGERY

## 2023-03-13 PROCEDURE — 1123F ACP DISCUSS/DSCN MKR DOCD: CPT | Performed by: ORTHOPAEDIC SURGERY

## 2023-03-13 PROCEDURE — G8420 CALC BMI NORM PARAMETERS: HCPCS | Performed by: ORTHOPAEDIC SURGERY

## 2023-03-13 RX ORDER — SULFAMETHOXAZOLE AND TRIMETHOPRIM 800; 160 MG/1; MG/1
1 TABLET ORAL 2 TIMES DAILY
Qty: 20 TABLET | Refills: 0 | Status: SHIPPED | OUTPATIENT
Start: 2023-03-13 | End: 2023-03-23

## 2023-03-13 NOTE — PROGRESS NOTES
[facility]  [facility] Visit    Chief Complaint: Right elbow pain and swelling    HPI:  Bee Mosher is a 78 y. o. who is here for initial assessment right elbow pain and swelling. He has been seen in the past for arthritis and has a history of right reverse total shoulder arthroplasty with Dr. León Ortiz. Surgery for his left shoulder was canceled last week due to uncontrolled diabetes. He fell and struck his right elbow 5 days ago. He was seen in urgent care earlier today. He is right-hand dominant. He reports redness and swelling of the right elbow over the last few days. He has not had this issue before. He has no other areas of pain. Patient Active Problem List   Diagnosis    Primary localized osteoarthrosis of shoulder region    S/p reverse total shoulder arthroplasty    Left hand pain    Dorsal wrist ganglion    Trigger finger, left ring finger    Spondylosis of lumbar region without myelopathy or radiculopathy    Spinal stenosis of lumbar region with neurogenic claudication    Sprain of anterior talofibular ligament of right ankle    Plantar fasciitis of right foot    Muscle weakness    Numbness and tingling in both hands    Neuropathy    Bilateral carpal tunnel syndrome    Trigger thumb of left hand    Primary osteoarthritis of left shoulder    Acute pain of left shoulder    Rotator cuff arthropathy of left shoulder    Complete tear of left rotator cuff    Rupture of quadriceps muscle, right, initial encounter    Rupture of rectus femoris tendon, right, initial encounter    Arthritis of knee, right       ROS:  Constitutional: denies fever, chills, weight loss  MSK: denies pain in other joints, muscle aches  Neurological: denies numbness, tingling, weakness    Exam:  Resp. rate 16, height 5' 10\" (1.778 m), weight 170 lb (77.1 kg).     Appearance: sitting in exam room chair, appears to be in no acute distress, awake and alert  Resp: unlabored breathing on room air  Skin: warm, dry and intact with out erythema or significant increased temperature  RUE: Fluctuance over the olecranon. There is surrounding erythema. There are scabs over a few abrasions about the olecranon. He demonstrates full active elbow flexion, extension, pronation, supination with minimal pain. Sensation intact light touch in the radial, ulnar, median nerve distributions. Palpable radial pulse. Wrist capillary fill distally. Imaging:  3 views of the right elbow were performed and interpreted today. There are no fractures or dislocations. He has an osteophyte of the olecranon process. Assessment:  Right elbow septic olecranon bursitis    Plan:  We discussed the diagnosis and treatment options. I have asked him not to wrap his elbow tightly or rested on any hemostasis. Bactrim was prescribed today for 10 days to take to help clear the infection. He will follow-up in 1 week for repeat evaluation. Total time spent on today's encounter was at least 24 minutes. This time included reviewing prior notes, radiographs, and lab results when available, reviewing history obtained by medical assistant, performing history and physical exam, reviewing tests/radiographs with the patient, counseling the patient, ordering medications or tests, documentation in the electronic health record, and coordination of care. This dictation was done with Dragon dictation and may contain mechanical errors related to translation.

## 2023-03-23 ENCOUNTER — OFFICE VISIT (OUTPATIENT)
Dept: ORTHOPEDIC SURGERY | Age: 83
End: 2023-03-23
Payer: MEDICARE

## 2023-03-23 VITALS — HEIGHT: 70 IN | WEIGHT: 170 LBS | BODY MASS INDEX: 24.34 KG/M2 | RESPIRATION RATE: 16 BRPM

## 2023-03-23 DIAGNOSIS — M71.121 SEPTIC OLECRANON BURSITIS OF RIGHT ELBOW: ICD-10-CM

## 2023-03-23 PROCEDURE — 1123F ACP DISCUSS/DSCN MKR DOCD: CPT | Performed by: ORTHOPAEDIC SURGERY

## 2023-03-23 PROCEDURE — 99213 OFFICE O/P EST LOW 20 MIN: CPT | Performed by: ORTHOPAEDIC SURGERY

## 2023-03-23 PROCEDURE — G8484 FLU IMMUNIZE NO ADMIN: HCPCS | Performed by: ORTHOPAEDIC SURGERY

## 2023-03-23 PROCEDURE — 1036F TOBACCO NON-USER: CPT | Performed by: ORTHOPAEDIC SURGERY

## 2023-03-23 PROCEDURE — G8420 CALC BMI NORM PARAMETERS: HCPCS | Performed by: ORTHOPAEDIC SURGERY

## 2023-03-23 PROCEDURE — G8427 DOCREV CUR MEDS BY ELIG CLIN: HCPCS | Performed by: ORTHOPAEDIC SURGERY

## 2023-03-23 RX ORDER — SULFAMETHOXAZOLE AND TRIMETHOPRIM 800; 160 MG/1; MG/1
1 TABLET ORAL 2 TIMES DAILY
Qty: 20 TABLET | Refills: 0 | Status: SHIPPED | OUTPATIENT
Start: 2023-03-23 | End: 2023-04-02

## 2023-03-23 NOTE — PROGRESS NOTES
[facility]  Office Visit    Chief Complaint: Right elbow septic olecranon bursitis    HPI:  Sam Avalos is a 78 y. o. who is here in follow-up of right elbow septic olecranon bursitis. He has been on antibiotics for the last 10 days. He reports improvement in redness, swelling, pain about the elbow. He still has mild drainage on the gauze he puts on this elbow. He continues to have discomfort when he rests his elbow on hard surfaces. Patient Active Problem List   Diagnosis    Primary localized osteoarthrosis of shoulder region    S/p reverse total shoulder arthroplasty    Left hand pain    Dorsal wrist ganglion    Trigger finger, left ring finger    Spondylosis of lumbar region without myelopathy or radiculopathy    Spinal stenosis of lumbar region with neurogenic claudication    Sprain of anterior talofibular ligament of right ankle    Plantar fasciitis of right foot    Muscle weakness    Numbness and tingling in both hands    Neuropathy    Bilateral carpal tunnel syndrome    Trigger thumb of left hand    Primary osteoarthritis of left shoulder    Acute pain of left shoulder    Rotator cuff arthropathy of left shoulder    Complete tear of left rotator cuff    Rupture of quadriceps muscle, right, initial encounter    Rupture of rectus femoris tendon, right, initial encounter    Arthritis of knee, right       ROS:  Constitutional: denies fever, chills, weight loss  MSK: denies pain in other joints, muscle aches  Neurological: denies numbness, tingling, weakness    Exam:  Resp. rate 16, height 5' 10\" (1.778 m), weight 170 lb (77.1 kg). Appearance: sitting in exam room chair, appears to be in no acute distress, awake and alert  Resp: unlabored breathing on room air  Skin: warm, dry and intact with out erythema or significant increased temperature  RUE: Minimal fluctuance over the olecranon. There is mild surrounding erythema.   Improvement in redness and swelling compared to

## 2023-04-03 ENCOUNTER — OFFICE VISIT (OUTPATIENT)
Dept: ORTHOPEDIC SURGERY | Age: 83
End: 2023-04-03
Payer: MEDICARE

## 2023-04-03 DIAGNOSIS — M71.121 SEPTIC OLECRANON BURSITIS OF RIGHT ELBOW: Primary | ICD-10-CM

## 2023-04-03 PROCEDURE — G8427 DOCREV CUR MEDS BY ELIG CLIN: HCPCS | Performed by: ORTHOPAEDIC SURGERY

## 2023-04-03 PROCEDURE — 1123F ACP DISCUSS/DSCN MKR DOCD: CPT | Performed by: ORTHOPAEDIC SURGERY

## 2023-04-03 PROCEDURE — 1036F TOBACCO NON-USER: CPT | Performed by: ORTHOPAEDIC SURGERY

## 2023-04-03 PROCEDURE — 99213 OFFICE O/P EST LOW 20 MIN: CPT | Performed by: ORTHOPAEDIC SURGERY

## 2023-04-03 PROCEDURE — G8420 CALC BMI NORM PARAMETERS: HCPCS | Performed by: ORTHOPAEDIC SURGERY

## 2023-04-03 NOTE — LETTER
April 3, 2023      Jennifer Hodge MD  [address] [address]      Patient: Zac Goldman   MR Number: [id number]   YOB: 1940   Date of Visit: 4/3/2023       Dear Jennifer Hodge: Thank you for referring Madeleine Browne to me for evaluation/treatment. Below are the relevant portions of my assessment and plan of care. If you have questions, please do not hesitate to call me. I look forward to following Deshaun Ryan along with you.     Sincerely,        Geo Edwards MD

## 2023-04-03 NOTE — PROGRESS NOTES
[facility]  Office Visit    Chief Complaint: Right elbow septic olecranon bursitis    HPI:  Garett Reeder is a 78 y. o. who is here in follow-up of right elbow septic olecranon bursitis. He has been on antibiotics for the last 3 weeks. He reports improvement in redness, swelling, pain about the elbow. The drainage has also stopped. He still has tenderness about the elbow. Patient Active Problem List   Diagnosis    Primary localized osteoarthrosis of shoulder region    S/p reverse total shoulder arthroplasty    Left hand pain    Dorsal wrist ganglion    Trigger finger, left ring finger    Spondylosis of lumbar region without myelopathy or radiculopathy    Spinal stenosis of lumbar region with neurogenic claudication    Sprain of anterior talofibular ligament of right ankle    Plantar fasciitis of right foot    Muscle weakness    Numbness and tingling in both hands    Neuropathy    Bilateral carpal tunnel syndrome    Trigger thumb of left hand    Primary osteoarthritis of left shoulder    Acute pain of left shoulder    Rotator cuff arthropathy of left shoulder    Complete tear of left rotator cuff    Rupture of quadriceps muscle, right, initial encounter    Rupture of rectus femoris tendon, right, initial encounter    Arthritis of knee, right       ROS:  Constitutional: denies fever, chills, weight loss  MSK: denies pain in other joints, muscle aches  Neurological: denies numbness, tingling, weakness    Exam:  Appearance: sitting in exam room chair, appears to be in no acute distress, awake and alert  Resp: unlabored breathing on room air  Skin: warm, dry and intact with out erythema or significant increased temperature  RUE: Minimal fluctuance over the olecranon. There is mild surrounding erythema. Improvement in redness and swelling compared to prior examination. He demonstrates full active elbow flexion, extension, pronation, supination with minimal pain.   Sensation intact

## 2023-04-17 ENCOUNTER — OFFICE VISIT (OUTPATIENT)
Dept: ORTHOPEDIC SURGERY | Age: 83
End: 2023-04-17
Payer: MEDICARE

## 2023-04-17 DIAGNOSIS — M71.121 SEPTIC OLECRANON BURSITIS OF RIGHT ELBOW: Primary | ICD-10-CM

## 2023-04-17 PROCEDURE — 1123F ACP DISCUSS/DSCN MKR DOCD: CPT | Performed by: ORTHOPAEDIC SURGERY

## 2023-04-17 PROCEDURE — 1036F TOBACCO NON-USER: CPT | Performed by: ORTHOPAEDIC SURGERY

## 2023-04-17 PROCEDURE — G8420 CALC BMI NORM PARAMETERS: HCPCS | Performed by: ORTHOPAEDIC SURGERY

## 2023-04-17 PROCEDURE — 99213 OFFICE O/P EST LOW 20 MIN: CPT | Performed by: ORTHOPAEDIC SURGERY

## 2023-04-17 PROCEDURE — G8427 DOCREV CUR MEDS BY ELIG CLIN: HCPCS | Performed by: ORTHOPAEDIC SURGERY

## 2023-04-17 NOTE — PROGRESS NOTES
the radial, ulnar, median nerve distributions. Palpable radial pulse. Brisk capillary refill distally. Imaging:  None    Assessment:  Right elbow septic olecranon bursitis, resolved    Plan:  He has healed and his elbow has near normal appearance and has been off antibiotics for a few weeks. He is having minimal pain. He may progress activities tolerated and follow-up as needed for the right elbow. Total time spent on today's encounter was at least 22 minutes. This time included reviewing prior notes, radiographs, and lab results when available, reviewing history obtained by medical assistant, performing history and physical exam, reviewing tests/radiographs with the patient, counseling the patient, ordering medications or tests, documentation in the electronic health record, and coordination of care. This dictation was done with Dragon dictation and may contain mechanical errors related to translation.

## 2023-06-29 ENCOUNTER — OFFICE VISIT (OUTPATIENT)
Dept: ORTHOPEDIC SURGERY | Age: 83
End: 2023-06-29

## 2023-06-29 VITALS — BODY MASS INDEX: 24.34 KG/M2 | WEIGHT: 170 LBS | HEIGHT: 70 IN

## 2023-06-29 DIAGNOSIS — Z96.611 H/O TOTAL SHOULDER REPLACEMENT, RIGHT: Primary | ICD-10-CM

## 2024-07-01 ENCOUNTER — OFFICE VISIT (OUTPATIENT)
Dept: ORTHOPEDIC SURGERY | Age: 84
End: 2024-07-01
Payer: MEDICARE

## 2024-07-01 VITALS — BODY MASS INDEX: 24.34 KG/M2 | WEIGHT: 170 LBS | RESPIRATION RATE: 16 BRPM | HEIGHT: 70 IN

## 2024-07-01 DIAGNOSIS — Z96.611 H/O TOTAL SHOULDER REPLACEMENT, RIGHT: Primary | ICD-10-CM

## 2024-07-01 PROCEDURE — 99213 OFFICE O/P EST LOW 20 MIN: CPT | Performed by: ORTHOPAEDIC SURGERY

## 2024-07-01 PROCEDURE — 1123F ACP DISCUSS/DSCN MKR DOCD: CPT | Performed by: ORTHOPAEDIC SURGERY

## 2024-07-01 PROCEDURE — G8420 CALC BMI NORM PARAMETERS: HCPCS | Performed by: ORTHOPAEDIC SURGERY

## 2024-07-01 PROCEDURE — G8427 DOCREV CUR MEDS BY ELIG CLIN: HCPCS | Performed by: ORTHOPAEDIC SURGERY

## 2024-07-01 PROCEDURE — 1036F TOBACCO NON-USER: CPT | Performed by: ORTHOPAEDIC SURGERY

## 2024-07-01 NOTE — PROGRESS NOTES
[facility]  Office Visit    Chief Complaint: Follow-up for right reverse total shoulder arthroplasty    HPI:  Anton Hay is a 83 y.o. who is here in follow-up left right reverse total shoulder arthroplasty performed in February 2015.  He has been seen in the past for his left shoulder as well.  He has been scheduled for shoulder surgery at least twice but canceled due to elevated A1c.  He is no longer having pain in the left shoulder but has difficulty with range of motion.  He has no issues with the right shoulder.  He walks with use of a walker.    Patient Active Problem List   Diagnosis    Primary localized osteoarthrosis of shoulder region    S/p reverse total shoulder arthroplasty    Left hand pain    Dorsal wrist ganglion    Trigger finger, left ring finger    Spondylosis of lumbar region without myelopathy or radiculopathy    Spinal stenosis of lumbar region with neurogenic claudication    Sprain of anterior talofibular ligament of right ankle    Plantar fasciitis of right foot    Muscle weakness    Numbness and tingling in both hands    Neuropathy    Bilateral carpal tunnel syndrome    Trigger thumb of left hand    Primary osteoarthritis of left shoulder    Acute pain of left shoulder    Rotator cuff arthropathy of left shoulder    Complete tear of left rotator cuff    Rupture of quadriceps muscle, right, initial encounter    Rupture of rectus femoris tendon, right, initial encounter    Arthritis of knee, right       ROS:  Constitutional: denies fever, chills, weight loss  MSK: denies pain in other joints, muscle aches  Neurological: denies numbness, tingling, weakness    Exam:  Resp. rate 16, height 1.778 m (5' 10\"), weight 77.1 kg (170 lb).    Appearance: sitting in exam room chair, appears to be in no acute distress, awake and alert  Resp: unlabored breathing on room air  Skin: warm, dry and intact with out erythema or significant increased temperature  RUE: He

## 2025-04-17 ENCOUNTER — OFFICE VISIT (OUTPATIENT)
Dept: ORTHOPEDIC SURGERY | Age: 85
End: 2025-04-17
Payer: MEDICARE

## 2025-04-17 VITALS — WEIGHT: 170 LBS | BODY MASS INDEX: 24.34 KG/M2 | HEIGHT: 70 IN

## 2025-04-17 DIAGNOSIS — M25.552 LEFT HIP PAIN: Primary | ICD-10-CM

## 2025-04-17 PROCEDURE — 99213 OFFICE O/P EST LOW 20 MIN: CPT | Performed by: ORTHOPAEDIC SURGERY

## 2025-04-17 PROCEDURE — 1123F ACP DISCUSS/DSCN MKR DOCD: CPT | Performed by: ORTHOPAEDIC SURGERY

## 2025-04-17 PROCEDURE — 1125F AMNT PAIN NOTED PAIN PRSNT: CPT | Performed by: ORTHOPAEDIC SURGERY

## 2025-04-17 PROCEDURE — 1159F MED LIST DOCD IN RCRD: CPT | Performed by: ORTHOPAEDIC SURGERY

## 2025-04-17 PROCEDURE — 1160F RVW MEDS BY RX/DR IN RCRD: CPT | Performed by: ORTHOPAEDIC SURGERY

## 2025-04-17 PROCEDURE — 1036F TOBACCO NON-USER: CPT | Performed by: ORTHOPAEDIC SURGERY

## 2025-04-17 PROCEDURE — G8427 DOCREV CUR MEDS BY ELIG CLIN: HCPCS | Performed by: ORTHOPAEDIC SURGERY

## 2025-04-17 PROCEDURE — G8420 CALC BMI NORM PARAMETERS: HCPCS | Performed by: ORTHOPAEDIC SURGERY

## 2025-04-17 NOTE — PROGRESS NOTES
[facility] Office Visit  Brad Sawant MD    Reason for visit: Left hip pain    HPI:  Anton Hay is a 84 y.o. who is here for initial evaluation of left hip pain following a fall.  He did not have this issue until he fell directly on his left buttocks in December 2024.  He has had pain since that time.  The pain is posterior and has not improved.  Pain is present on a daily basis and is worse with prolonged sitting and standing.  He is walking with a rolling walker and has to sit on a pad to help relieve his symptoms.  The pain does not radiate and there is no numbness or tingling.  He denies anterior or lateral hip pain.  He rates the pain as up to 9/10.    Past Medical History:   Diagnosis Date    Arthritis     Diabetes mellitus (HCC)     High blood pressure     [place] (hard of hearing)     Hyperlipidemia     Irregular heart beat     Wears glasses     Wears hearing aid in both ears     Wears partial dentures        Exam:  Ht 1.778 m (5' 10\")   Wt 77.1 kg (170 lb)   BMI 24.39 kg/m²     Appearance: sitting in exam room chair, appears to be in no acute distress, awake and alert  Resp: unlabored breathing on room air  Skin: warm, dry and intact with out erythema or significant increased temperature  Neuro: grossly intact both lower extremities. Intact sensation to light touch. Motor exam 4+ to 5/5 in all major motor groups.  LLE: Tenderness over sacrum and sacroiliac joint.  Nontender over greater trochanter.  Negative Stinchfield examination and no pain with logroll of the hip.  Motor function and sensation intact distally.    Imaging:  AP pelvis, AP and frog-leg lateral views of the left hip were performed and interpreted today.  These are significant for moderate changes due to osteoarthritis including periarticular arthritis and joint space narrowing.  There are no fractures noted.    Assessment:  Left posterior hip pain secondary to SI joint arthritis versus sacral fracture    Plan:  We discussed the

## 2025-04-24 ENCOUNTER — HOSPITAL ENCOUNTER (EMERGENCY)
Dept: CT IMAGING | Age: 85
Discharge: HOME | End: 2025-04-24
Attending: ORTHOPAEDIC SURGERY
Payer: MEDICARE

## 2025-04-24 ENCOUNTER — HOSPITAL ENCOUNTER (EMERGENCY)
Age: 85
Discharge: HOME | End: 2025-04-24
Payer: MEDICARE

## 2025-04-24 ENCOUNTER — APPOINTMENT (OUTPATIENT)
Dept: GENERAL RADIOLOGY | Age: 85
End: 2025-04-24
Attending: ORTHOPAEDIC SURGERY
Payer: MEDICARE

## 2025-04-24 ENCOUNTER — TELEPHONE (OUTPATIENT)
Dept: ORTHOPEDIC SURGERY | Age: 85
End: 2025-04-24

## 2025-04-24 VITALS
TEMPERATURE: 97.8 F | BODY MASS INDEX: 25.77 KG/M2 | DIASTOLIC BLOOD PRESSURE: 63 MMHG | HEART RATE: 65 BPM | SYSTOLIC BLOOD PRESSURE: 143 MMHG | RESPIRATION RATE: 16 BRPM | OXYGEN SATURATION: 100 % | WEIGHT: 180 LBS | HEIGHT: 70 IN

## 2025-04-24 DIAGNOSIS — R26.81 GAIT INSTABILITY: ICD-10-CM

## 2025-04-24 DIAGNOSIS — S62.92XA CLOSED FRACTURE OF LEFT HAND, INITIAL ENCOUNTER: ICD-10-CM

## 2025-04-24 DIAGNOSIS — M25.552 LEFT HIP PAIN: ICD-10-CM
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
## 2025-04-24 PROCEDURE — 73130 X-RAY EXAM OF HAND: CPT

## 2025-04-24 PROCEDURE — 29125 APPL SHORT ARM SPLINT STATIC: CPT

## 2025-04-24 PROCEDURE — 6370000000 HC RX 637 (ALT 250 FOR IP): Performed by: GENERAL ACUTE CARE HOSPITAL

## 2025-04-24 PROCEDURE — 72192 CT PELVIS W/O DYE: CPT

## 2025-04-24 PROCEDURE — 99283 EMERGENCY DEPT VISIT LOW MDM: CPT

## 2025-04-24 RX ORDER — ACETAMINOPHEN 500 MG
1000 TABLET ORAL ONCE
Status: COMPLETED | OUTPATIENT
Start: 2025-04-24 | End: 2025-04-24

## 2025-04-24 RX ADMIN — ACETAMINOPHEN 1000 MG: 500 TABLET ORAL at 12:18

## 2025-04-24 ASSESSMENT — PAIN - FUNCTIONAL ASSESSMENT
PAIN_FUNCTIONAL_ASSESSMENT: 0-10
PAIN_FUNCTIONAL_ASSESSMENT: ACTIVITIES ARE NOT PREVENTED

## 2025-04-24 ASSESSMENT — PAIN DESCRIPTION - LOCATION
LOCATION: HAND
LOCATION: HAND

## 2025-04-24 ASSESSMENT — PAIN DESCRIPTION - DESCRIPTORS: DESCRIPTORS: DISCOMFORT

## 2025-04-24 ASSESSMENT — PAIN SCALES - GENERAL
PAINLEVEL_OUTOF10: 1
PAINLEVEL_OUTOF10: 4
PAINLEVEL_OUTOF10: 1

## 2025-04-24 ASSESSMENT — PAIN DESCRIPTION - ORIENTATION
ORIENTATION: LEFT
ORIENTATION: LEFT

## 2025-04-24 NOTE — ED PROVIDER NOTES
**ADVANCED PRACTICE PROVIDER, I HAVE EVALUATED THIS PATIENT**        [facility] EMERGENCY DEPARTMENT  EMERGENCY DEPARTMENT ENCOUNTER      Pt Name: Anton Hay  MRN:[id number]  Birthdate 1940  Date of evaluation: 4/24/2025  Provider: BARBARA Adkins CNP  Note Started: 2:01 PM EDT 4/24/25        Chief Complaint:    Chief Complaint   Patient presents with    Fall     Patient arrives through triage with complaints of left hand pain from a fall. Patient reports mechanical fall and landing on left hand. EMS came to help patient off floor but declined transport to ER. Patient denies hitting head. Denies any dizziness prior to fall.          Nursing Notes, Past Medical Hx, Past Surgical Hx, Social Hx, Allergies, and Family Hx were all reviewed and agreed with or any disagreements were addressed in the HPI.    HPI: (Location, Duration, Timing, Severity, Quality, Assoc Sx, Context, Modifying factors)    History From: ***  {Limitations to history (Optional):30076}    {Social Determinants Significantly Affecting Health (Optional):07132}    Chief Complaint of ***    This is a  84 y.o. male who presents  ***    PastMedical/Surgical History:      Diagnosis Date    Arthritis     Diabetes mellitus (HCC)     High blood pressure     [place] (hard of hearing)     Hyperlipidemia     Irregular heart beat     Wears glasses     Wears hearing aid in both ears     Wears partial dentures          Procedure Laterality Date    COLONOSCOPY      CYST REMOVAL Left 05/21/2015    right eye    FOOT SURGERY Left     cyst removed from foot    JOINT REPLACEMENT Right     shoulder    KNEE ARTHROSCOPY Left     LUMBAR SPINE SURGERY Bilateral 6/12/2019    BILATERAL L4 TRANSFORAMINAL EPIDURAL STEROID INJECTION WITH FLUOROSCOPY performed by Moni Villarreal MD at [facility]    LUMBAR SPINE SURGERY Bilateral 6/26/2019    BILATERAL L4 TRANSFORAMINAL EPIDURAL STEROID INJECTION WITH FLUOROSCOPY performed by Moni Villarreal MD at [facility]    LUMBAR  with rest.  He has not taken anything for his symptoms.  He did not hit his head or lose consciousness.  He is not anticoagulated.  He denies other injury.  He states that he has otherwise felt well and has been without fever, chills, or other symptoms.  Physical exam complete.  Patient was nontoxic, afebrile, mildly hypertensive.  GCS 15.  He is without any focal neurologic deficits.  Patient with moderate edema and mild deformity noted to the dorsum of the left hand.  Range of motion and strength decreased due to pain.  Left upper extremity neurovascular status intact.  Cap refill less than 2 seconds.    PastMedical/Surgical History:      Diagnosis Date    Arthritis     Diabetes mellitus (HCC)     High blood pressure     [place] (hard of hearing)     Hyperlipidemia     Irregular heart beat     Wears glasses     Wears hearing aid in both ears     Wears partial dentures          Procedure Laterality Date    COLONOSCOPY      CYST REMOVAL Left 05/21/2015    right eye    FOOT SURGERY Left     cyst removed from foot    JOINT REPLACEMENT Right     shoulder    KNEE ARTHROSCOPY Left     LUMBAR SPINE SURGERY Bilateral 6/12/2019    BILATERAL L4 TRANSFORAMINAL EPIDURAL STEROID INJECTION WITH FLUOROSCOPY performed by Moni Villarreal MD at [facility] [address]    LUMBAR SPINE SURGERY Bilateral 6/26/2019    BILATERAL L4 TRANSFORAMINAL EPIDURAL STEROID INJECTION WITH FLUOROSCOPY performed by Moni Villarreal MD at [facility] [address]    LUMBAR SPINE SURGERY N/A 10/14/2019    MICROLUMBAR LAMINECTOMY L4-5, 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, performed by Peña Martinez MD at [facility] OR    OTHER SURGICAL HISTORY      tumor removed from neck    SHOULDER SURGERY Bilateral     removed calcium deposits    WRIST SURGERY Left        Medications:  Previous Medications    ASPIRIN 81 MG TABLET    Take 81 mg by mouth daily.    ATORVASTATIN (LIPITOR) 80 MG TABLET    Take 80 mg by mouth nightly    CHOLECALCIFEROL (VITAMIN D3) 50 MCG (2000 UT) CAPS    Take by mouth

## 2025-04-24 NOTE — TELEPHONE ENCOUNTER
PATIENT WAS SEEN AT ER, HAS L HAND FX WOULD LIKE TO SEE DR. FREIRE, HAS APPT SCHEDULED ALREADY FOR L HIP 5/1 ASKING IF HE COULD BE SEEN FOR HAND FX ALSO OR IF HE NEEDS TO BE SEEN SOONER.     PLEASE RETURN CALL [phone]

## 2025-04-29 ASSESSMENT — ENCOUNTER SYMPTOMS
VOMITING: 0
ABDOMINAL PAIN: 0
BACK PAIN: 0
SHORTNESS OF BREATH: 0
VOICE CHANGE: 0
CHEST TIGHTNESS: 0
COUGH: 0
WHEEZING: 0
NAUSEA: 0
SORE THROAT: 0

## 2025-05-01 ENCOUNTER — OFFICE VISIT (OUTPATIENT)
Dept: ORTHOPEDIC SURGERY | Age: 85
End: 2025-05-01
Payer: MEDICARE

## 2025-05-01 ENCOUNTER — HOSPITAL ENCOUNTER (OUTPATIENT)
Dept: GENERAL RADIOLOGY | Age: 85
Discharge: HOME | End: 2025-05-01
Attending: ORTHOPAEDIC SURGERY
Payer: MEDICARE

## 2025-05-01 ENCOUNTER — OFFICE VISIT (OUTPATIENT)
Dept: ORTHOPEDIC SURGERY | Age: 85
End: 2025-05-01

## 2025-05-01 VITALS — HEIGHT: 70 IN | WEIGHT: 180 LBS | BODY MASS INDEX: 25.77 KG/M2

## 2025-05-01 DIAGNOSIS — S62.355A CLOSED NONDISPLACED FRACTURE OF SHAFT OF FOURTH METACARPAL BONE OF LEFT HAND, INITIAL ENCOUNTER: Primary | ICD-10-CM

## 2025-05-01 DIAGNOSIS — M16.12 PRIMARY OSTEOARTHRITIS OF LEFT HIP: ICD-10-CM

## 2025-05-01 DIAGNOSIS — S62.357A CLOSED NONDISPLACED FRACTURE OF SHAFT OF FIFTH METACARPAL BONE OF LEFT HAND, INITIAL ENCOUNTER: ICD-10-CM

## 2025-05-01 DIAGNOSIS — M16.12 PRIMARY OSTEOARTHRITIS OF LEFT HIP: Primary | ICD-10-CM

## 2025-05-01 PROCEDURE — 20610 DRAIN/INJ JOINT/BURSA W/O US: CPT | Performed by: ORTHOPAEDIC SURGERY

## 2025-05-01 PROCEDURE — 77002 NEEDLE LOCALIZATION BY XRAY: CPT

## 2025-05-01 PROCEDURE — 77002 NEEDLE LOCALIZATION BY XRAY: CPT | Performed by: ORTHOPAEDIC SURGERY

## 2025-05-01 PROCEDURE — 6360000002 HC RX W HCPCS

## 2025-05-01 PROCEDURE — 20610 DRAIN/INJ JOINT/BURSA W/O US: CPT

## 2025-05-01 NOTE — PROGRESS NOTES
After obtaining the patient's consent, the patient was placed supine on the fluoroscopy table. The left groin was prepped with chlorhexidine. The hip was visualized under fluoroscopic guidance. The needle was placed anteriorly into the left hip joint and aspiration was performed.  No blood or joint fluid was aspirated. A mixture of 3.5mL 0.25% marcaine and 1.5mL 40 mg/mL Kenalog was injected into the left hip joint.  The needle was removed and a bandage was applied.  The patient tolerated the procedure without any difficulty.     The patient was injected for degenerative arthritis of the hip.

## 2025-05-01 NOTE — PROGRESS NOTES
[facility] Office Visit  Brad Sawant MD    Reason for visit: Left hip pain; left hand injury    HPI:  Anton Hay is a 84 y.o. who is here in follow-up of left hip pain following a fall.  For review, he did not have this issue until he fell directly on his left buttocks in December 2024.  He has had pain since that time.  The pain is posterior and has not improved.  Pain is present on a daily basis and is worse with prolonged sitting and standing.  He is walking with a rolling walker and has to sit on a pad to help relieve his symptoms.  The pain does not radiate and there is no numbness or tingling.  He also now reports pain in his left groin and difficulty picking up his left leg.    He fell 1 week ago again and injured his left hand.  He was seen in the ER and diagnosed with 4th and 5th metacarpal fractures and has been in a splint.  He is right-handed.    Past Medical History:   Diagnosis Date    Arthritis     Diabetes mellitus (HCC)     High blood pressure     [place] (hard of hearing)     Hyperlipidemia     Irregular heart beat     Wears glasses     Wears hearing aid in both ears     Wears partial dentures        Exam:  Ht 1.778 m (5' 10\")   Wt 81.6 kg (180 lb)   BMI 25.83 kg/m²     Appearance: sitting in exam room chair, appears to be in no acute distress, awake and alert  Resp: unlabored breathing on room air  Skin: warm, dry and intact with out erythema or significant increased temperature  Neuro: grossly intact both lower extremities. Intact sensation to light touch. Motor exam 4+ to 5/5 in all major motor groups.  LLE: Tenderness over sacrum and sacroiliac joint.  Nontender over greater trochanter.  Mild pain with Stinchfield examination.  Motor function and sensation intact distally.  LUE: Ecchymosis and swelling of the dorsum of the hand.  He demonstrates active full finger extension.  He has limited flexion.  Sensation intact to light touch in the radial, ulnar, median nerve

## 2025-06-02 ENCOUNTER — OFFICE VISIT (OUTPATIENT)
Dept: ORTHOPEDIC SURGERY | Age: 85
End: 2025-06-02
Payer: MEDICARE

## 2025-06-02 VITALS — WEIGHT: 180 LBS | BODY MASS INDEX: 25.77 KG/M2 | HEIGHT: 70 IN

## 2025-06-02 DIAGNOSIS — S62.355D CLOSED NONDISPLACED FRACTURE OF SHAFT OF FOURTH METACARPAL BONE OF LEFT HAND WITH ROUTINE HEALING, SUBSEQUENT ENCOUNTER: Primary | ICD-10-CM

## 2025-06-02 PROCEDURE — G8427 DOCREV CUR MEDS BY ELIG CLIN: HCPCS | Performed by: ORTHOPAEDIC SURGERY

## 2025-06-02 PROCEDURE — 1160F RVW MEDS BY RX/DR IN RCRD: CPT | Performed by: ORTHOPAEDIC SURGERY

## 2025-06-02 PROCEDURE — 1126F AMNT PAIN NOTED NONE PRSNT: CPT | Performed by: ORTHOPAEDIC SURGERY

## 2025-06-02 PROCEDURE — 99213 OFFICE O/P EST LOW 20 MIN: CPT | Performed by: ORTHOPAEDIC SURGERY

## 2025-06-02 PROCEDURE — 1159F MED LIST DOCD IN RCRD: CPT | Performed by: ORTHOPAEDIC SURGERY

## 2025-06-02 PROCEDURE — G8419 CALC BMI OUT NRM PARAM NOF/U: HCPCS | Performed by: ORTHOPAEDIC SURGERY

## 2025-06-02 PROCEDURE — 1036F TOBACCO NON-USER: CPT | Performed by: ORTHOPAEDIC SURGERY

## 2025-06-02 PROCEDURE — 1123F ACP DISCUSS/DSCN MKR DOCD: CPT | Performed by: ORTHOPAEDIC SURGERY

## 2025-06-02 NOTE — PROGRESS NOTES
[facility] Orthopedics Office Visit  Brad Sawant MD    Reason for visit: Left hip pain; left hand injury    HPI:  Anton Hay is a 84 y.o. who is here in follow-up of left hip pain following a fall.  For review, he did not have this issue until he fell directly on his left buttocks in December 2024.  He underwent a left hip steroid injection on May 1, 2025.  He reports excellent relief of symptoms and currently does not have any pain.  He continues to walk with use of a walker.     He also comes in today in follow-up of a left hand fracture.  He sustained fractures of the left 4th and 5th metacarpals on April 24, 2025.  He is being treated nonoperatively in a brace.  He reports no pain in the hand today.    Past Medical History:   Diagnosis Date    Arthritis     Diabetes mellitus (HCC)     High blood pressure     [place] (hard of hearing)     Hyperlipidemia     Irregular heart beat     Wears glasses     Wears hearing aid in both ears     Wears partial dentures      Exam:  Ht 1.778 m (5' 10\")   Wt 81.6 kg (180 lb)   BMI 25.83 kg/m²     Appearance: sitting in exam room chair, appears to be in no acute distress, awake and alert  Resp: unlabored breathing on room air  Skin: warm, dry and intact with out erythema or significant increased temperature  Neuro: grossly intact both lower extremities. Intact sensation to light touch. Motor exam 4+ to 5/5 in all major motor groups.  LLE: Examination demonstrates negative logroll and negative Stinchfield.  There is brisk capillary refill.  Strength is 5/5 in hamstrings, quads, hip flexors.   LUE: No ecchymosis or swelling in the hand.  He demonstrates active full finger extension.  He has limited flexion.  Sensation intact to light touch in the radial, ulnar, median nerve distributions.    Imaging:  Prior left hip radiographs were reviewed today.  These are significant for moderate changes due to osteoarthritis including periarticular arthritis and joint space narrowing.  There

## 2025-07-18 ENCOUNTER — OFFICE VISIT (OUTPATIENT)
Dept: ORTHOPEDIC SURGERY | Age: 85
End: 2025-07-18

## 2025-07-18 VITALS — BODY MASS INDEX: 25.92 KG/M2 | HEIGHT: 69 IN | WEIGHT: 175 LBS

## 2025-07-18 DIAGNOSIS — S76.111A QUADRICEPS TENDON RUPTURE, RIGHT, INITIAL ENCOUNTER: ICD-10-CM

## 2025-07-18 DIAGNOSIS — Z96.611 H/O TOTAL SHOULDER REPLACEMENT, RIGHT: Primary | ICD-10-CM

## 2025-07-19 NOTE — PROGRESS NOTES
This dictation was done with [employer] dictation and may contain mechanical errors related to translation.  Height 1.753 m (5' 9\"), weight 79.4 kg (175 lb).    This is a very pleasant 84-year-old gentleman who had a right reverse total shoulder done on 2/20/2015 he is doing really well there is no pain he has good range of motion well-healed incision.  The AP transaxillary view and Y view show excellent alignment sizing and fit of the right reverse total shoulder with no lucencies good screw fixation into the glenoid or any other bony abnormalities.    This exam is consistent with decent shoulder range of motion with abduction and forward flexion and some limitation with the internal rotation or reaching behind himself.    Impression is stable healed reverse total shoulder.    We talked about longer doing prophylactic antibiotics for dental procedures etc. and he will follow-up with us on a as needed basis only

## (undated) DEVICE — STERILE POLYISOPRENE POWDER-FREE SURGICAL GLOVES: Brand: PROTEXIS

## (undated) DEVICE — GAUZE,SPONGE,2"X2",8PLY,STERILE,LF,2'S: Brand: MEDLINE

## (undated) DEVICE — DRAPE C ARM UNIV W41XL74IN CLR PLAS XR VELC CLSR POLY STRP

## (undated) DEVICE — Device

## (undated) DEVICE — DRAPE MICSCP W54XL150IN W/ 4 BINOC GLS LENS LEICA

## (undated) DEVICE — SYRINGE MED 30ML STD CLR PLAS LUERLOCK TIP N CTRL DISP

## (undated) DEVICE — SYRINGE MED 3ML CLR PLAS STD N CTRL LUERLOCK TIP DISP

## (undated) DEVICE — KIT OR ROOM TURNOVER W/STRAP

## (undated) DEVICE — Device: Brand: JELCO

## (undated) DEVICE — ELECTRODE PT RET AD L9FT HI MOIST COND ADH HYDRGEL CORDED

## (undated) DEVICE — SYRINGE MED 10ML LUERLOCK TIP W/O SFTY DISP

## (undated) DEVICE — UNIVERSAL BLOCK TRAY: Brand: AVANOS*

## (undated) DEVICE — NEEDLE SPNL 22GA L3.5IN BLK HUB S STL REG WALL FIT STYL W/

## (undated) DEVICE — TOWEL,OR,DSP,ST,BLUE,STD,4/PK,20PK/CS: Brand: MEDLINE

## (undated) DEVICE — STANDARD HYPODERMIC NEEDLE,POLYPROPYLENE HUB: Brand: MONOJECT

## (undated) DEVICE — CANISTER, RIGID, 1200CC: Brand: MEDLINE INDUSTRIES, INC.

## (undated) DEVICE — LOTION PREP REMV 5OZ IODO CLR TINC OF BENZ DURAPREP

## (undated) DEVICE — CHLORAPREP 26ML ORANGE

## (undated) DEVICE — PORT VLV 2 W NDL FREE SMRTSITE

## (undated) DEVICE — SUTURE VCRL SZ 0 L27IN ABSRB UD L26MM CT-2 1/2 CIR J270H

## (undated) DEVICE — COVER LT HNDL BLU PLAS

## (undated) DEVICE — KIT JACK TBL PT CARE

## (undated) DEVICE — DISPOSABLE OR TOWEL: Brand: CARDINAL HEALTH

## (undated) DEVICE — SOLUTION IV IRRIG 500ML 0.9% SODIUM CHL 2F7123

## (undated) DEVICE — 3M™ IOBAN™ 2 ANTIMICROBIAL INCISE DRAPE 6650EZ: Brand: IOBAN™ 2

## (undated) DEVICE — TOOL 14MH30 LEGEND 14CM 3MM: Brand: MIDAS REX ™

## (undated) DEVICE — PEN: MARKING STD 100/CS: Brand: MEDICAL ACTION INDUSTRIES

## (undated) DEVICE — MEDIA CONTRAST RX ISOVUE-300 61% 30ML VIALS

## (undated) DEVICE — GOWN SIRUS NONREIN LG W/TWL: Brand: MEDLINE INDUSTRIES, INC.

## (undated) DEVICE — 3M™ TEGADERM™ TRANSPARENT FILM DRESSING FRAME STYLE, 1626W, 4 IN X 4-3/4 IN (10 CM X 12 CM), 50/CT 4CT/CASE: Brand: 3M™ TEGADERM™

## (undated) DEVICE — SUTURE MCRYL SZ 4-0 L18IN ABSRB UD L19MM PS-2 3/8 CIR PRIM Y496G

## (undated) DEVICE — NEEDLE HYPO 25GA L1.5IN BVL ORIENTED ECLIPSE

## (undated) DEVICE — SUTURE VCRL SZ 3-0 L18IN ABSRB UD L26MM SH 1/2 CIR J864D